# Patient Record
Sex: MALE | Race: WHITE | NOT HISPANIC OR LATINO | Employment: FULL TIME | URBAN - METROPOLITAN AREA
[De-identification: names, ages, dates, MRNs, and addresses within clinical notes are randomized per-mention and may not be internally consistent; named-entity substitution may affect disease eponyms.]

---

## 2017-01-04 ENCOUNTER — APPOINTMENT (OUTPATIENT)
Dept: PHYSICAL THERAPY | Facility: REHABILITATION | Age: 67
End: 2017-01-04
Payer: COMMERCIAL

## 2017-01-06 ENCOUNTER — APPOINTMENT (OUTPATIENT)
Dept: PHYSICAL THERAPY | Facility: REHABILITATION | Age: 67
End: 2017-01-06
Payer: COMMERCIAL

## 2017-01-06 PROCEDURE — 97110 THERAPEUTIC EXERCISES: CPT

## 2017-01-06 PROCEDURE — 97140 MANUAL THERAPY 1/> REGIONS: CPT

## 2017-01-12 ENCOUNTER — APPOINTMENT (OUTPATIENT)
Dept: PHYSICAL THERAPY | Facility: REHABILITATION | Age: 67
End: 2017-01-12
Payer: COMMERCIAL

## 2017-01-27 ENCOUNTER — APPOINTMENT (OUTPATIENT)
Dept: PHYSICAL THERAPY | Facility: REHABILITATION | Age: 67
End: 2017-01-27
Payer: COMMERCIAL

## 2017-01-30 ENCOUNTER — APPOINTMENT (OUTPATIENT)
Dept: PHYSICAL THERAPY | Facility: REHABILITATION | Age: 67
End: 2017-01-30
Payer: COMMERCIAL

## 2017-01-30 PROCEDURE — 97110 THERAPEUTIC EXERCISES: CPT

## 2017-01-30 PROCEDURE — 97140 MANUAL THERAPY 1/> REGIONS: CPT

## 2017-02-02 ENCOUNTER — APPOINTMENT (OUTPATIENT)
Dept: PHYSICAL THERAPY | Facility: REHABILITATION | Age: 67
End: 2017-02-02
Payer: COMMERCIAL

## 2017-02-06 ENCOUNTER — APPOINTMENT (OUTPATIENT)
Dept: PHYSICAL THERAPY | Facility: REHABILITATION | Age: 67
End: 2017-02-06
Payer: COMMERCIAL

## 2017-02-06 PROCEDURE — 97140 MANUAL THERAPY 1/> REGIONS: CPT

## 2017-02-06 PROCEDURE — 97110 THERAPEUTIC EXERCISES: CPT

## 2017-02-13 ENCOUNTER — APPOINTMENT (OUTPATIENT)
Dept: PHYSICAL THERAPY | Facility: REHABILITATION | Age: 67
End: 2017-02-13
Payer: COMMERCIAL

## 2017-02-13 PROCEDURE — 97110 THERAPEUTIC EXERCISES: CPT

## 2017-02-13 PROCEDURE — 97140 MANUAL THERAPY 1/> REGIONS: CPT

## 2017-02-20 ENCOUNTER — APPOINTMENT (OUTPATIENT)
Dept: PHYSICAL THERAPY | Facility: REHABILITATION | Age: 67
End: 2017-02-20
Payer: COMMERCIAL

## 2017-02-20 PROCEDURE — 97110 THERAPEUTIC EXERCISES: CPT

## 2017-02-20 PROCEDURE — 97140 MANUAL THERAPY 1/> REGIONS: CPT

## 2017-02-27 ENCOUNTER — APPOINTMENT (OUTPATIENT)
Dept: PHYSICAL THERAPY | Facility: REHABILITATION | Age: 67
End: 2017-02-27
Payer: COMMERCIAL

## 2017-03-17 ENCOUNTER — APPOINTMENT (OUTPATIENT)
Dept: LAB | Facility: CLINIC | Age: 67
End: 2017-03-17
Payer: COMMERCIAL

## 2017-03-17 ENCOUNTER — TRANSCRIBE ORDERS (OUTPATIENT)
Dept: LAB | Facility: CLINIC | Age: 67
End: 2017-03-17

## 2017-03-17 DIAGNOSIS — E03.9 UNSPECIFIED HYPOTHYROIDISM: Primary | ICD-10-CM

## 2017-03-17 DIAGNOSIS — E03.9 UNSPECIFIED HYPOTHYROIDISM: ICD-10-CM

## 2017-03-17 LAB — TSH SERPL DL<=0.05 MIU/L-ACNC: 0.17 UIU/ML (ref 0.36–3.74)

## 2017-03-17 PROCEDURE — 36415 COLL VENOUS BLD VENIPUNCTURE: CPT

## 2017-03-17 PROCEDURE — 84443 ASSAY THYROID STIM HORMONE: CPT

## 2017-05-22 ENCOUNTER — TRANSCRIBE ORDERS (OUTPATIENT)
Dept: LAB | Facility: CLINIC | Age: 67
End: 2017-05-22

## 2017-05-22 ENCOUNTER — APPOINTMENT (OUTPATIENT)
Dept: LAB | Facility: CLINIC | Age: 67
End: 2017-05-22
Payer: COMMERCIAL

## 2017-05-22 DIAGNOSIS — E03.9 UNSPECIFIED HYPOTHYROIDISM: ICD-10-CM

## 2017-05-22 DIAGNOSIS — E03.9 UNSPECIFIED HYPOTHYROIDISM: Primary | ICD-10-CM

## 2017-05-22 LAB — TSH SERPL DL<=0.05 MIU/L-ACNC: 0.71 UIU/ML (ref 0.36–3.74)

## 2017-05-22 PROCEDURE — 36415 COLL VENOUS BLD VENIPUNCTURE: CPT

## 2017-05-22 PROCEDURE — 84443 ASSAY THYROID STIM HORMONE: CPT

## 2017-09-14 DIAGNOSIS — Z12.5 ENCOUNTER FOR SCREENING FOR MALIGNANT NEOPLASM OF PROSTATE: ICD-10-CM

## 2017-09-29 ENCOUNTER — APPOINTMENT (OUTPATIENT)
Dept: LAB | Facility: CLINIC | Age: 67
End: 2017-09-29
Payer: COMMERCIAL

## 2017-09-29 ENCOUNTER — TRANSCRIBE ORDERS (OUTPATIENT)
Dept: LAB | Facility: CLINIC | Age: 67
End: 2017-09-29

## 2017-09-29 DIAGNOSIS — E03.9 UNSPECIFIED HYPOTHYROIDISM: Primary | ICD-10-CM

## 2017-09-29 DIAGNOSIS — Z12.5 ENCOUNTER FOR SCREENING FOR MALIGNANT NEOPLASM OF PROSTATE: ICD-10-CM

## 2017-09-29 DIAGNOSIS — E03.9 UNSPECIFIED HYPOTHYROIDISM: ICD-10-CM

## 2017-09-29 LAB
PSA SERPL-MCNC: 0.8 NG/ML (ref 0–4)
TSH SERPL DL<=0.05 MIU/L-ACNC: 0.18 UIU/ML (ref 0.36–3.74)

## 2017-09-29 PROCEDURE — 36415 COLL VENOUS BLD VENIPUNCTURE: CPT

## 2017-09-29 PROCEDURE — 84443 ASSAY THYROID STIM HORMONE: CPT

## 2017-09-29 PROCEDURE — 84153 ASSAY OF PSA TOTAL: CPT

## 2017-10-04 ENCOUNTER — ALLSCRIPTS OFFICE VISIT (OUTPATIENT)
Dept: OTHER | Facility: OTHER | Age: 67
End: 2017-10-04

## 2017-10-04 LAB
CLARITY UR: NORMAL
COLOR UR: YELLOW
GLUCOSE (HISTORICAL): NORMAL
HGB UR QL STRIP.AUTO: NORMAL
KETONES UR STRIP-MCNC: NORMAL MG/DL
LEUKOCYTE ESTERASE UR QL STRIP: NORMAL
NITRITE UR QL STRIP: NORMAL
PH UR STRIP.AUTO: 6 [PH]
PROT UR STRIP-MCNC: NORMAL MG/DL
SP GR UR STRIP.AUTO: 1.01

## 2017-10-27 NOTE — PROGRESS NOTES
Assessment  1  Nephrolithiasis (592 0) (N20 0)   2  Screening for prostate cancer (V76 44) (Z12 5)   3  Impotence (607 84) (N52 9)    Plan   Impotence    · Cialis 20 MG Oral Tablet; Take as directed   Rx By: Patricia Cota; Dispense: 18 Days ; #:18 Tablet; Refill: 3;For: Impotence; INOCENCIO = N; Verified Transmission to [facility]/PHARMACY #[id number] Last Updated By: System, [employer]; 10/4/2017 3:28:26 PM   · (1) PSA, DIAGNOSTIC (FOLLOW-UP); Status:Active; Requested WQD:[id number]; Perform:[facility]; [id number];[facility]; For:Impotence; Ordered By:Jacob Ramos;  Nephrolithiasis    · Urine Dip Non-Automated- POC; Status:Complete - Retrospective By Protocol  Authorization;   Done: [id number] 03:06PM   Performed: In Office; UCO:[id number]; Last Updated Lebam Key; 10/4/2017 3:08:54 PM;Ordered;For:Nephrolithiasis; Ordered By:Se Ramos;    Follow-up visit in 1 year Evaluation and Treatment  Follow-up  Status: Hold For - Scheduling  Requested for: 20HLH4058  Ordered; For: Impotence;  Ordered By: Patricia Cota  Performed:   Due: 85DHE4536     Discussion/Summary  Discussion Summary:   Patient continues to do well from a urologic standpoint  Cialis script renewed  He will return in 1 year with PSA prior  Patient's Capacity to Self-Care: Patient is able to Self-Care  Chief Complaint  Chief Complaint Free Text Note Form: nephrolithiasis, prostate cancer screening      History of Present Illness  HPI: Patient with established history of ED and nephrolithiasis returns in follow up  He denies any interval flank pain or hematuria  Reports nocturia x2-4 which is diet related  No interested in ED treatment at this time  PSA is stable at 0 8 and stable  Adequate response to Cialis  Review of Systems  Complete-Male Urology:   Constitutional: No fever or chills, feels well, no tiredness, no recent weight gain or weight loss     Respiratory: No complaints of shortness of breath, no wheezing, no cough, no SOB on exertion, no orthopnea or PND  Cardiovascular: No complaints of slow heart rate, no fast heart rate, no chest pain, no palpitations, no leg claudication, no lower extremity  Gastrointestinal: No complaints of abdominal pain, no constipation, no nausea or vomiting, no diarrhea or bloody stools  Genitourinary: Empty sensation-- and-- stream quality fair, but-- as noted in HPI,-- no dysuria,-- no urinary hesitancy,-- no hematuria,-- no incontinence-- and-- no feelings of urinary urgency--    The patient presents with complaints of nocturia (4-5 x per night)  Musculoskeletal: No complaints of arthralgia, no myalgias, no joint swelling or stiffness, no limb pain or swelling  Integumentary: No complaints of skin rash or skin lesions, no itching, no skin wound, no dry skin  Hematologic/Lymphatic: No complaints of swollen glands, no swollen glands in the neck, does not bleed easily, no easy bruising  Neurological: No compliants of headache, no confusion, no convulsions, no numbness or tingling, no dizziness or fainting, no limb weakness, no difficulty walking  Active Problems  1  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)   2  Chronic obstructive pulmonary disease (496) (J44 9)   3  Coronary artery disease (414 00) (I25 10)   4  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)   5  Diverticulosis (562 10) (K57 90)   6  Esophageal reflux (530 81) (K21 9)   7  Hiatal hernia (553 3) (K44 9)   8  History of allergy (V15 09) (Z88 9)   9  Hyperlipidemia (272 4) (E78 5)   10  Hypertension (401 9) (I10)   11  Hypothyroidism (244 9) (E03 9)   12  Lower back pain (724 2) (M54 5)   13  Nephrolithiasis (592 0) (N20 0)   14  Pain in joint of left shoulder (719 41) (M25 512)   15  Pain in joint of right shoulder region (719 41) (M25 511)   16  Pancytopenia (284 19) (D61 818)   17  Rupture of right long head biceps tendon, sequela (905 8) (S46 111S)   18   Screening for prostate cancer (V76 44) (Z12 5)   19  Special screening examination for neoplasm of prostate (V76 44) (Z12 5)   20  Subacromial impingement of right shoulder (726 19) (M75 41)    Past Medical History  1  History of Abdominal pain, LLQ (left lower quadrant) (789 04) (R10 32)   2  History of Elevated ALT measurement (790 4) (R74 0)   3  History of Elevated AST (SGOT) (790 4) (R74 0)   4  History of Fatty liver (571 8) (K76 0)   5  History of diverticulitis of colon (V12 79) (Z87 19)   6  History of gastritis (V12 79) (Z87 19)   7  History of hemorrhoids (V13 89) (Z87 19)   8  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)   9  History of Lower esophageal ring (750 3) (Q39 3)   10  History of Nephrolithiasis (V13 01)   11  History of Trigger finger (727 03) (M65 30)   12  History of Ulcerative colitis without complications (130 4) (D95 29)  Active Problems And Past Medical History Reviewed: The active problems and past medical history were reviewed and updated today  Surgical History  1  History of Appendectomy   2  History of Biopsy Lymph Node   3  History of Biopsy Of Liver   4  History of Cath Stent Placement Number Of Stents Placed:   5  History of Colonoscopy (Fiberoptic)   6  History of Diagnostic Esophagogastroduodenoscopy   7  History of Hand Incision Tendon Sheath Of A Finger   8  History of Inguinal Hernia Repair  Surgical History Reviewed: The surgical history was reviewed and updated today  Family History  Mother    1  Family history of Coronary Artery Disease (V17 49)  Father    2  Family history of Acute Myocardial Infarction (V17 3)  Family History    3  Family history of Gastric Cancer (V16 0)  Family History Reviewed: The family history was reviewed and updated today  Social History   · Being A Social Drinker   · Former smoker (V80 86) (R69 887)   · Marital History -  (V61 03)   · Occupation:   · Working Full Time  Social History Reviewed:  The social history was reviewed and updated today       Current Meds   1  Aspirin 325 MG Oral Tablet; TAKE 1 TABLET DAILY; Therapy: 02MIF7795 to Recorded   2  Levothyroxine Sodium 175 MCG Oral Tablet; TAKE 1 TABLET BY MOUTH ONCE DAILY; Therapy: 20EDN4013 to (Kay Ethel)  Requested for: 62MRI3302; Last   Rx:25Oct2012 Ordered   3  Metoprolol Succinate ER 25 MG Oral Tablet Extended Release 24 Hour; Take 1 tablet   daily; Therapy: 38BRR7328 to (Evaluate:17Oct2013); Last Rx:22Oct2012 Ordered   4  Multiple Vitamin TABS; TAKE 1 TABLET DAILY; Therapy: ([phone]) to Recorded   5  Pantoprazole Sodium 40 MG Oral Tablet Delayed Release; TAKE 1 TABLET 30 MINUTES   BEFORE BREAKFAST DAILY; Therapy: 94HQR9805 to (Evaluate:20Apr2013) Recorded   6  Simvastatin 40 MG Oral Tablet; TAKE 1 TABLET DAILY; Therapy: 22LMB3730 to (Evaluate:17Oct2013); Last Rx:22Oct2012 Ordered   7  ZyrTEC Allergy 10 MG Oral Tablet; TAKE 1 TABLET DAILY AS DIRECTED; Therapy: 08PIH1597 to (Evaluate:20Apr2013) Recorded  Medication List Reviewed: The medication list was reviewed and updated today  Allergies  1  Nicotine Transdermal System 21-14-7 MG/24HR KIT    Vitals  Vital Signs    Recorded: 03VXN3889 03:04PM   Heart Rate 80   Systolic 135   Diastolic 84   Height 5 ft 4 in   Weight 170 lb 2 oz   BMI Calculated 29 2   BSA Calculated 1 83     Physical Exam    Constitutional   General appearance: No acute distress, well appearing and well nourished  Pulmonary   Respiratory effort: No increased work of breathing or signs of respiratory distress  Auscultation of lungs: Clear to auscultation  Cardiovascular   Auscultation of heart: Normal rate and rhythm, normal S1 and S2, without murmurs  Examination of extremities for edema and/or varicosities: Normal     Abdomen   Abdomen: Non-tender, no masses  Genitourinary   Anus and perineum: Normal     Scrotum contents: Normal size, no masses  Epididymis: Normal, no masses  Testes: Normal testes, no masses  Urethral meatus: Normal, no lesions  Penis: Normal, no lesions  Digital rectal exam of prostate: Normal size, no masses  -- 35 gram  No nodules  Musculoskeletal   Gait and station: Normal     Digits and nails: Normal without clubbing or cyanosis  Inspection/palpation of joints, bones, and muscles: Normal     Skin   Skin and subcutaneous tissue: Normal without rashes or lesions  Lymphatic   Palpation of lymph nodes in groin: Normal     Neurologic   Cranial nerves: Cranial nerves 2-12 intact  Results/Data  Urine Dip Non-Automated- POC [id number] 03:06PM Marivel Gregorio     Test Name Result Flag Reference   Color Yellow     Clarity Transparent     Leukocytes -     Nitrite -     Blood -     Protein -     Ph 6 0     Specific Gravity 1 015     Ketone -     Glucose -         Future Appointments    Date/Time Provider Specialty Site   10/03/2018 03:15 PM ANDREE Ludwig   Urology [address] [place] [address]     Signatures   Electronically signed by : ANDREE Zacarias ; Oct  5 2017  7:25AM EST                       (Author)

## 2017-11-21 ENCOUNTER — TRANSCRIBE ORDERS (OUTPATIENT)
Dept: LAB | Facility: CLINIC | Age: 67
End: 2017-11-21

## 2017-11-21 ENCOUNTER — APPOINTMENT (OUTPATIENT)
Dept: LAB | Facility: CLINIC | Age: 67
End: 2017-11-21
Payer: COMMERCIAL

## 2017-11-21 DIAGNOSIS — E78.5 HYPERLIPIDEMIA, UNSPECIFIED HYPERLIPIDEMIA TYPE: Primary | ICD-10-CM

## 2017-11-21 DIAGNOSIS — I25.119 ATHEROSCLEROSIS OF NATIVE CORONARY ARTERY WITH ANGINA PECTORIS, UNSPECIFIED WHETHER NATIVE OR TRANSPLANTED HEART (HCC): ICD-10-CM

## 2017-11-21 DIAGNOSIS — E78.5 HYPERLIPIDEMIA, UNSPECIFIED HYPERLIPIDEMIA TYPE: ICD-10-CM

## 2017-11-21 DIAGNOSIS — E55.9 AVITAMINOSIS D: ICD-10-CM

## 2017-11-21 LAB
25(OH)D3 SERPL-MCNC: 36.4 NG/ML (ref 30–100)
ALBUMIN SERPL BCP-MCNC: 4.1 G/DL (ref 3.5–5)
ALP SERPL-CCNC: 64 U/L (ref 46–116)
ALT SERPL W P-5'-P-CCNC: 42 U/L (ref 12–78)
ANION GAP SERPL CALCULATED.3IONS-SCNC: 5 MMOL/L (ref 4–13)
AST SERPL W P-5'-P-CCNC: 27 U/L (ref 5–45)
BILIRUB SERPL-MCNC: 0.6 MG/DL (ref 0.2–1)
BUN SERPL-MCNC: 17 MG/DL (ref 5–25)
CALCIUM SERPL-MCNC: 9.1 MG/DL (ref 8.3–10.1)
CHLORIDE SERPL-SCNC: 102 MMOL/L (ref 100–108)
CHOLEST SERPL-MCNC: 143 MG/DL (ref 50–200)
CO2 SERPL-SCNC: 30 MMOL/L (ref 21–32)
CREAT SERPL-MCNC: 0.88 MG/DL (ref 0.6–1.3)
ERYTHROCYTE [DISTWIDTH] IN BLOOD BY AUTOMATED COUNT: 13.5 % (ref 11.6–15.1)
GFR SERPL CREATININE-BSD FRML MDRD: 90 ML/MIN/1.73SQ M
GLUCOSE P FAST SERPL-MCNC: 103 MG/DL (ref 65–99)
HCT VFR BLD AUTO: 47.5 % (ref 36.5–49.3)
HDLC SERPL-MCNC: 37 MG/DL (ref 40–60)
HGB BLD-MCNC: 16.4 G/DL (ref 12–17)
LDLC SERPL CALC-MCNC: 75 MG/DL (ref 0–100)
MCH RBC QN AUTO: 31.1 PG (ref 26.8–34.3)
MCHC RBC AUTO-ENTMCNC: 34.5 G/DL (ref 31.4–37.4)
MCV RBC AUTO: 90 FL (ref 82–98)
PLATELET # BLD AUTO: 192 THOUSANDS/UL (ref 149–390)
PMV BLD AUTO: 9.9 FL (ref 8.9–12.7)
POTASSIUM SERPL-SCNC: 4.2 MMOL/L (ref 3.5–5.3)
PROT SERPL-MCNC: 6.7 G/DL (ref 6.4–8.2)
RBC # BLD AUTO: 5.28 MILLION/UL (ref 3.88–5.62)
SODIUM SERPL-SCNC: 137 MMOL/L (ref 136–145)
TRIGL SERPL-MCNC: 156 MG/DL
WBC # BLD AUTO: 5.58 THOUSAND/UL (ref 4.31–10.16)

## 2017-11-21 PROCEDURE — 85027 COMPLETE CBC AUTOMATED: CPT

## 2017-11-21 PROCEDURE — 36415 COLL VENOUS BLD VENIPUNCTURE: CPT

## 2017-11-21 PROCEDURE — 80061 LIPID PANEL: CPT

## 2017-11-21 PROCEDURE — 82306 VITAMIN D 25 HYDROXY: CPT

## 2017-11-21 PROCEDURE — 80053 COMPREHEN METABOLIC PANEL: CPT

## 2018-01-14 VITALS
HEIGHT: 64 IN | HEART RATE: 80 BPM | WEIGHT: 170.13 LBS | SYSTOLIC BLOOD PRESSURE: 138 MMHG | BODY MASS INDEX: 29.05 KG/M2 | DIASTOLIC BLOOD PRESSURE: 84 MMHG

## 2018-03-15 ENCOUNTER — TRANSCRIBE ORDERS (OUTPATIENT)
Dept: RADIOLOGY | Facility: CLINIC | Age: 68
End: 2018-03-15

## 2018-03-15 ENCOUNTER — APPOINTMENT (OUTPATIENT)
Dept: RADIOLOGY | Facility: CLINIC | Age: 68
End: 2018-03-15
Payer: COMMERCIAL

## 2018-03-15 DIAGNOSIS — J18.9 PNEUMONIA, ORGANISM UNSPECIFIED(486): ICD-10-CM

## 2018-03-15 DIAGNOSIS — J18.9 PNEUMONIA, ORGANISM UNSPECIFIED(486): Primary | ICD-10-CM

## 2018-03-15 PROCEDURE — 71046 X-RAY EXAM CHEST 2 VIEWS: CPT

## 2018-04-27 RX ORDER — TADALAFIL 20 MG
TABLET ORAL
Refills: 0 | COMMUNITY
Start: 2018-02-05 | End: 2018-08-17

## 2018-04-27 RX ORDER — PANTOPRAZOLE SODIUM 40 MG/1
1 TABLET, DELAYED RELEASE ORAL 2 TIMES DAILY
COMMUNITY
Start: 2012-10-22

## 2018-04-27 RX ORDER — LEVOTHYROXINE SODIUM 175 UG/1
1 TABLET ORAL DAILY
COMMUNITY
Start: 2012-03-19

## 2018-04-27 RX ORDER — SIMVASTATIN 40 MG
1 TABLET ORAL DAILY
COMMUNITY
Start: 2012-10-22 | End: 2022-07-08 | Stop reason: SDUPTHER

## 2018-04-27 RX ORDER — TADALAFIL 20 MG/1
TABLET ORAL
COMMUNITY
Start: 2017-10-04 | End: 2018-08-17

## 2018-04-27 RX ORDER — MULTIVITAMIN
1 TABLET ORAL DAILY
COMMUNITY

## 2018-04-27 RX ORDER — CETIRIZINE HYDROCHLORIDE 10 MG/1
1 TABLET ORAL DAILY
COMMUNITY
Start: 2012-10-22

## 2018-04-27 RX ORDER — ASPIRIN 325 MG
1 TABLET ORAL DAILY
COMMUNITY
Start: 2012-10-22

## 2018-04-27 RX ORDER — LEVOFLOXACIN 500 MG/1
TABLET, FILM COATED ORAL
Refills: 0 | Status: ON HOLD | COMMUNITY
Start: 2018-03-18 | End: 2018-08-24

## 2018-04-27 RX ORDER — METOPROLOL SUCCINATE 25 MG/1
1 TABLET, EXTENDED RELEASE ORAL DAILY
COMMUNITY
Start: 2012-10-22 | End: 2020-11-11 | Stop reason: ALTCHOICE

## 2018-04-30 ENCOUNTER — HOSPITAL ENCOUNTER (OUTPATIENT)
Dept: RADIOLOGY | Facility: HOSPITAL | Age: 68
Discharge: HOME | End: 2018-04-30
Attending: ORTHOPAEDIC SURGERY
Payer: COMMERCIAL

## 2018-04-30 VITALS
HEIGHT: 64 IN | HEART RATE: 71 BPM | DIASTOLIC BLOOD PRESSURE: 94 MMHG | SYSTOLIC BLOOD PRESSURE: 135 MMHG | BODY MASS INDEX: 28.34 KG/M2 | WEIGHT: 166 LBS

## 2018-04-30 DIAGNOSIS — M76.52 PATELLAR TENDONITIS OF LEFT KNEE: Primary | ICD-10-CM

## 2018-04-30 DIAGNOSIS — M25.562 ACUTE PAIN OF LEFT KNEE: ICD-10-CM

## 2018-04-30 PROCEDURE — 99214 OFFICE O/P EST MOD 30 MIN: CPT | Performed by: ORTHOPAEDIC SURGERY

## 2018-04-30 PROCEDURE — 73564 X-RAY EXAM KNEE 4 OR MORE: CPT

## 2018-04-30 PROCEDURE — 73562 X-RAY EXAM OF KNEE 3: CPT

## 2018-04-30 NOTE — PROGRESS NOTES
Assessment/Plan:  Assessment/Plan   1  Patellar tendonitis of left knee  Ambulatory referral to Physical Therapy   2  Acute pain of left knee  XR knee 4+ vw left injury    XR knee 3 vw right non injury       Plan  Upon physical examination and review of the x-rays, I suspect Evans Kumar has left knee patellar tendinitis which is contributing to his symptoms  At this time, I'm recommending he continue with his workout routine, but limit or avoid deep squatting positions  I'm also recommending a visit to physical therapy for consultation and treatment, and we will provide him with a prescription to schedule his appointment in the office today  At this time, I do not feel any further intervention or treatment is warranted  If he should continue to experience lingering or worsening symptoms, a left knee MRI may be appropriate to further evaluate the knee  I will follow up with Evans Kumar on an as-needed basis as his symptoms dictate  He understands and is in agreement with this treatment plan  Subjective:   Patient ID: Almaz Ca is a 78 y o  male  Evans Kumar is a 69-year-old male here today for initial evaluation of his left knee pain  He is a prior patient that was seen for a shoulder injury and reports this is doing well today, he reports ongoing left knee pain for the past 2-3 months with weightbearing activities requiring him to perform a deep squat As well as when getting out of a car or standing up from sitting  He reports his symptoms are worse a day or 2 after his workout sessions, and describes his pain as intermittent and dull in nature  He rates his pain as a 5/10 on a pain scale, and reports wearing a knee brace helps control his pain and symptoms  He denies any numbness, tingling, radiating pain, or prior left knee injury/trauma  He is hopeful to continue his workout routine, and wants to ensure he is not doing any further damage to his left knee by continuing to do          The following portions of the patient's history were reviewed and updated as appropriate:   He  has no past medical history on file  He There are no active problems to display for this patient  He  has no past surgical history on file  His family history is not on file  He  reports that he has quit smoking  He has never used smokeless tobacco  He reports that he does not drink alcohol or use drugs  Current Outpatient Prescriptions   Medication Sig Dispense Refill    aspirin 325 mg tablet Take 1 tablet by mouth daily      cetirizine (ZYRTEC ALLERGY) 10 mg tablet Take 1 tablet by mouth daily      CIALIS 20 MG tablet   0    levofloxacin (LEVAQUIN) 500 mg tablet TAKE 1 TABLET EVERY 24 HOURS FOR 7 DAYS  0    levothyroxine 175 mcg tablet Take 1 tablet by mouth daily      metoprolol succinate (TOPROL-XL) 25 mg 24 hr tablet Take 1 tablet by mouth daily      Multiple Vitamin tablet Take 1 tablet by mouth daily      pantoprazole (PROTONIX) 40 mg tablet Take 1 tablet by mouth Daily      simvastatin (ZOCOR) 40 mg tablet Take 1 tablet by mouth daily      tadalafil (CIALIS) 20 MG tablet Take by mouth       No current facility-administered medications for this visit  No current outpatient prescriptions on file prior to visit  No current facility-administered medications on file prior to visit  He is allergic to nicotine       Review of Systems   Constitutional: Negative for chills, fever and unexpected weight change  HENT: Negative for hearing loss, nosebleeds and sore throat  Eyes: Negative for pain, redness and visual disturbance  Respiratory: Negative for cough, shortness of breath and wheezing  Cardiovascular: Negative for chest pain, palpitations and leg swelling  Gastrointestinal: Negative for abdominal pain, nausea and vomiting  Endocrine: Negative for polydipsia and polyuria  Genitourinary: Negative for dysuria and hematuria  Musculoskeletal: Positive for arthralgias   Negative for joint swelling and myalgias  Skin: Negative for rash and wound  Neurological: Negative for dizziness, numbness and headaches  Psychiatric/Behavioral: Negative for decreased concentration and suicidal ideas  The patient is not nervous/anxious  (-) Depression       Objective:  Left Knee Exam     Tenderness   The patient is experiencing tenderness in the patellar tendon  Range of Motion   The patient has normal left knee ROM  Extension: normal   Flexion: normal     Tests   Johnny:  Medial - negative Lateral - negative  Lachman:  Anterior - negative      Drawer:       Anterior - negative     Posterior - negative  Varus: negative  Valgus: negative  Pivot Shift: negative  Patellar Apprehension: negative    Other   Scars: absent  Sensation: normal  Pulse: present  Swelling: none  Effusion: no effusion present    Comments:  Patellar grind: Positive          Observations   Left Knee   Negative for effusion  Physical Exam   Constitutional: He is oriented to person, place, and time  He appears well-developed and well-nourished  HENT:   Head: Normocephalic  Nose: Nose normal    Eyes: Conjunctivae and EOM are normal    Neck: Normal range of motion  Cardiovascular: Normal rate and intact distal pulses  Pulmonary/Chest: Effort normal  No respiratory distress  Abdominal: Soft  He exhibits no distension  Musculoskeletal: Normal range of motion  Left knee: He exhibits normal range of motion, no swelling, no effusion, normal alignment, no LCL laxity, normal patellar mobility and no MCL laxity  Tenderness found  Medial joint line and patellar tendon tenderness noted  Legs:  Neurological: He is alert and oriented to person, place, and time  Skin: Skin is warm and dry  Psychiatric: He has a normal mood and affect   His behavior is normal  Judgment and thought content normal        I have personally reviewed pertinent films in PACS and my interpretation is Left knee x-rays are unremarkable, no obvious fracture or deformity noted

## 2018-05-14 ENCOUNTER — EVALUATION (OUTPATIENT)
Dept: PHYSICAL THERAPY | Facility: REHABILITATION | Age: 68
End: 2018-05-14
Payer: COMMERCIAL

## 2018-05-14 DIAGNOSIS — M76.52 PATELLAR TENDONITIS OF LEFT KNEE: ICD-10-CM

## 2018-05-14 PROCEDURE — G8978 MOBILITY CURRENT STATUS: HCPCS | Performed by: PHYSICAL THERAPIST

## 2018-05-14 PROCEDURE — G8979 MOBILITY GOAL STATUS: HCPCS | Performed by: PHYSICAL THERAPIST

## 2018-05-14 PROCEDURE — 97140 MANUAL THERAPY 1/> REGIONS: CPT | Performed by: PHYSICAL THERAPIST

## 2018-05-14 PROCEDURE — 97110 THERAPEUTIC EXERCISES: CPT | Performed by: PHYSICAL THERAPIST

## 2018-05-14 PROCEDURE — 97162 PT EVAL MOD COMPLEX 30 MIN: CPT | Performed by: PHYSICAL THERAPIST

## 2018-05-14 NOTE — PROGRESS NOTES
Evaluation    Today's date: 2018  Patient name: Art Nichole  : 1950  MRN: [id number]  Referring provider: Miladys Desouza MD  Dx:   Encounter Diagnosis     ICD-10-CM    1  Patellar tendonitis of left knee M76 52 Ambulatory referral to Physical Therapy       Start Time: 1450  Stop Time: 1540  Total time in clinic (min): 50 minutes    Assessment  Impairments: abnormal muscle tone, impaired balance, lacks appropriate home exercise program, pain with function and weight-bearing intolerance    Assessment details: 77 y/o male with c/o b/l (left > right) anterior knee pain that started a few months ago  He denies pain at rest in the seated position, but c/o an "unstable" feeling in his knee when he goes from a sitting to a standing position  Also, he c/o an "irritation" in the superior compartment above the patella with deep squatting and u/l activity  He denies any N/T or red flag sx's  Pt was recently seen by an ortho MD   X-rays were (-)  Pt referred to PT     Understanding of Dx/Px/POC: good   Prognosis: good    Goals  ST - I with HEP  2 - perform a functional squat without pain  LT - get in/out of the car without any pain or feeling of "instability"  2 - FOTO > 79    Plan  Patient would benefit from: skilled PT  Planned therapy interventions: manual therapy, neuromuscular re-education, patient education, therapeutic activities, therapeutic exercise and home exercise program  Frequency: 1x week  Treatment plan discussed with: patient        Subjective Evaluation    Quality of life: excellent    Pain  At best pain ratin  At worst pain ratin  Quality: dull ache and sharp  Relieving factors: rest and change in position      Diagnostic Tests  X-ray: normal  Patient Goals  Patient goals for therapy: increased strength, independence with ADLs/IADLs, return to sport/leisure activities, improved balance and decreased pain          Objective     Palpation     Additional Palpation Details  Palpation:  Left - increased tissue texture density of the deep distal quadriceps    Neurological Testing     Sensation     Knee   Left Knee   Intact: pin prick    Right Knee   Intact: pin prick     Active Range of Motion   Left Knee   Flexion: 120 degrees   Extension: 0 degrees     Right Knee   Flexion: 125 degrees   Extension: 0 degrees     Joint Play     Comments  Left proximal tibiofibular joint comments: PKB:  Right = 120, left = 130  Tests     Left Knee   Negative anterior drawer, posterior drawer, valgus stress test at 0 degrees and varus stress test at 0 degrees  Right Knee   Negative anterior drawer, posterior drawer, valgus stress test at 0 degrees and varus stress test at 0 degrees       Additional Tests Details  Functional tests:  b/l squat: fair due to torso collapse  u/l squat:  Poor due to knee collapse on left and trunk compensation on right      Flowsheet Rows      Most Recent Value   PT/OT G-Codes   Current Score  72   Projected Score  79   FOTO information reviewed  Yes   Assessment Type  Evaluation   G code set  Mobility: Walking & Moving Around   Mobility: Walking and Moving Around Current Status ()  CJ   Mobility: Walking and Moving Around Goal Status ()  CI          Precautions: hx CA    Daily Treatment Diary     Manual  05/14            Tissue deformation - distal quad (deep) LMR                                                                    Exercise Diary  05/14            u/l bridge - foot on p-ball 15"x3            Wall sits 15"x3            Quad smash (kb or lacrosse ball) Verbal                                                                                                                                                                                                                                             Modalities

## 2018-05-22 ENCOUNTER — OFFICE VISIT (OUTPATIENT)
Dept: PHYSICAL THERAPY | Facility: REHABILITATION | Age: 68
End: 2018-05-22
Payer: COMMERCIAL

## 2018-05-22 DIAGNOSIS — M76.52 PATELLAR TENDONITIS OF LEFT KNEE: Primary | ICD-10-CM

## 2018-05-22 PROCEDURE — 97530 THERAPEUTIC ACTIVITIES: CPT | Performed by: PHYSICAL THERAPIST

## 2018-05-22 PROCEDURE — 97110 THERAPEUTIC EXERCISES: CPT | Performed by: PHYSICAL THERAPIST

## 2018-05-22 NOTE — PROGRESS NOTES
Daily Note     Today's date: 2018  Patient name: Lynn Reddy  : 1950  MRN: [id number]  Referring provider: Parish Gama MD  Dx:   Encounter Diagnosis     ICD-10-CM    1  Patellar tendonitis of left knee M76 52        Start Time: 1630  Stop Time: 1715  Total time in clinic (min): 45 minutes    Subjective: Pt notes compliance with the current HEP  He occasionally feels tension on his left knee when doing the wall squats  Objective: See treatment diary below  HEP updated with banded TKE (blue band), side-ways banded TKE (green band), and hip hinging  Assessment: Tolerated treatment well  Patient demonstrated fatigue post treatment    Plan: Continue per plan of care          Precautions: hx CA    Daily Treatment Diary     Manual             Tissue deformation - distal quad (deep) LMR                                                                    Exercise Diary             u/l bridge - foot on p-ball 15"x3            Wall sits 15"x3            Quad smash (kb or lacrosse ball) Verbal            VG - b/l  L7 - 4'           VG - u/l  L7 - 3 x 7           Banded tke  Blue - fatigue x 3           tke with banded hip abd  orange - 3'           Standing hip hinge with dowel - deadlift  2'           Standing hip hinge with dowel - squat  2x20                                                                                                                                                              Modalities

## 2018-05-29 ENCOUNTER — OFFICE VISIT (OUTPATIENT)
Dept: PHYSICAL THERAPY | Facility: REHABILITATION | Age: 68
End: 2018-05-29
Payer: COMMERCIAL

## 2018-05-29 DIAGNOSIS — M76.52 PATELLAR TENDONITIS OF LEFT KNEE: Primary | ICD-10-CM

## 2018-05-29 PROCEDURE — 97530 THERAPEUTIC ACTIVITIES: CPT | Performed by: PHYSICAL THERAPIST

## 2018-05-29 PROCEDURE — 97140 MANUAL THERAPY 1/> REGIONS: CPT | Performed by: PHYSICAL THERAPIST

## 2018-05-29 NOTE — PROGRESS NOTES
Daily Note     Today's date: 2018  Patient name: Aaliyah Wheatley  : 1950  MRN: [id number]  Referring provider: Emil Byrd MD  Dx:   Encounter Diagnosis     ICD-10-CM    1  Patellar tendonitis of left knee M76 52        Start Time: 1705  Stop Time: 1755  Total time in clinic (min): 50 minutes    Subjective: Pt denies knee pain during the HEP, but c/o anterior knee pain the next day  Objective: See treatment diary below  Discussed proper technique for b/l and u/l squats  Assessment: Tolerated treatment well  Patient demonstrated fatigue post treatment    Plan: Continue per plan of care        Precautions: hx CA    Daily Treatment Diary     Manual            Tissue deformation - distal quad (deep) LMR            Prone quad stretch   LMR          Supine left hip add stretch   LMR          Gr 4 inf glide left hip   LMR          Lat distraction left hip with belt   LMR              Exercise Diary            u/l bridge - foot on p-ball 15"x3            Wall sits 15"x3            Quad smash (kb or lacrosse ball) Verbal            VG - b/l  L7 - 4'           VG - u/l  L7 - 3 x 7           Banded tke  Blue - fatigue x 3           tke with banded hip abd  orange - 3'           Standing hip hinge with dowel - deadlift  2'           Standing hip hinge with dowel - squat  2x20           Band assisted u/l squat   30 ea          Squat technique   5'                                                                                                                                   Modalities

## 2018-06-05 ENCOUNTER — OFFICE VISIT (OUTPATIENT)
Dept: PHYSICAL THERAPY | Facility: REHABILITATION | Age: 68
End: 2018-06-05
Payer: COMMERCIAL

## 2018-06-05 DIAGNOSIS — M76.52 PATELLAR TENDONITIS OF LEFT KNEE: Primary | ICD-10-CM

## 2018-06-05 PROCEDURE — 97140 MANUAL THERAPY 1/> REGIONS: CPT | Performed by: PHYSICAL THERAPIST

## 2018-06-05 PROCEDURE — 97110 THERAPEUTIC EXERCISES: CPT | Performed by: PHYSICAL THERAPIST

## 2018-06-05 PROCEDURE — 97530 THERAPEUTIC ACTIVITIES: CPT | Performed by: PHYSICAL THERAPIST

## 2018-06-05 NOTE — PROGRESS NOTES
Daily Note     Today's date: 2018  Patient name: Opal Loya  : 1950  MRN: [id number]  Referring provider: Mckenzie Lawton MD  Dx:   Encounter Diagnosis     ICD-10-CM    1  Patellar tendonitis of left knee M76 52        Start Time: 1610  Stop Time: 1705  Total time in clinic (min): 55 minutes    Subjective: No pain with functional squatting, but mild discomfort with u/l limb activity  Objective: See treatment diary below  HEP updated with side-stepping and tall kneel hip hinging  Assessment: Tolerated treatment well  Patient exhibited good technique with therapeutic exercises    Plan: Continue per plan of care         Precautions: hx CA    Daily Treatment Diary     Manual           Tissue deformation - distal quad (deep) LMR   LMR         Prone quad stretch   LMR LMR         s/l hip flexor stretch    LMR                                   Supine left hip add stretch   LMR          Gr 4 inf glide left hip   LMR          Lat distraction left hip with belt   LMR              Exercise Diary           u/l bridge - foot on p-ball 15"x3            Wall sits 15"x3            Quad smash (kb or lacrosse ball) Verbal            VG - b/l  L7 - 4'  L7 - 5'x2         VG - u/l  L7 - 3 x 7           Banded tke  Blue - fatigue x 3           tke with banded hip abd  orange - 3'           Standing hip hinge with dowel - deadlift  2'  40         Standing hip hinge with dowel - squat  2x20  40         Band assisted u/l squat   30 ea          Squat technique   5'          u/l squat - p-ball on wall    2x15         Banded side stepping    Blue - 50 ft x 2                                                                                                        Modalities

## 2018-06-12 ENCOUNTER — OFFICE VISIT (OUTPATIENT)
Dept: PHYSICAL THERAPY | Facility: REHABILITATION | Age: 68
End: 2018-06-12
Payer: COMMERCIAL

## 2018-06-12 DIAGNOSIS — M76.52 PATELLAR TENDONITIS OF LEFT KNEE: Primary | ICD-10-CM

## 2018-06-12 PROCEDURE — G8979 MOBILITY GOAL STATUS: HCPCS | Performed by: PHYSICAL THERAPIST

## 2018-06-12 PROCEDURE — 97110 THERAPEUTIC EXERCISES: CPT | Performed by: PHYSICAL THERAPIST

## 2018-06-12 PROCEDURE — G8978 MOBILITY CURRENT STATUS: HCPCS | Performed by: PHYSICAL THERAPIST

## 2018-06-12 PROCEDURE — 97140 MANUAL THERAPY 1/> REGIONS: CPT | Performed by: PHYSICAL THERAPIST

## 2018-06-12 NOTE — PROGRESS NOTES
Daily Note     Today's date: 2018  Patient name: Jackie Jorge  : 1950  MRN: [id number]  Referring provider: Virgilio Arias MD  Dx:   Encounter Diagnosis     ICD-10-CM    1  Patellar tendonitis of left knee M76 52        Start Time: 1640  Stop Time: 1720  Total time in clinic (min): 40 minutes    Subjective: Pt c/o along the LJL of the left knee the past two days  Objective: See treatment diary below  HEP updated with banded bridge and mini-SLR  Half-kneeling ankle df lunge:  Left more limited than right  Discussed activity modification to allow for tissue healing  Assessment: Tolerated treatment well  Patient demonstrated fatigue post treatment    Plan: Continue per plan of care       Precautions: hx CA    Daily Treatment Diary     Manual          Tissue deformation - distal quad (deep) LMR   LMR         Prone quad stretch   LMR LMR LMR        s/l hip flexor stretch    LMR         s/l tissue deformation - distal ITB     LMR        s/l tissue deformation - deep hip rotaters     LMR        Supine left hip add stretch   LMR          Gr 4 inf glide left hip   LMR          Lat distraction left hip with belt   LMR              Exercise Diary          u/l bridge - foot on p-ball 15"x3            Wall sits 15"x3            Quad smash (kb or lacrosse ball) Verbal            VG - b/l  L7 - 4'  L7 - 5'x2         VG - u/l  L7 - 3 x 7           Banded tke  Blue - fatigue x 3           tke with banded hip abd  orange - 3'           Standing hip hinge with dowel - deadlift  2'  40         Standing hip hinge with dowel - squat  2x20  40         Band assisted u/l squat   30 ea          Squat technique   5'          u/l squat - p-ball on wall    2x15         Banded side stepping    Blue - 50 ft x 2         Banded bridge     Blue - 2x25        Mini-SLR     10" - 2x5        VMO mini-SLR     10" - 2x5 Modalities

## 2018-06-18 ENCOUNTER — OFFICE VISIT (OUTPATIENT)
Dept: PHYSICAL THERAPY | Facility: REHABILITATION | Age: 68
End: 2018-06-18
Payer: COMMERCIAL

## 2018-06-18 DIAGNOSIS — M76.52 PATELLAR TENDONITIS OF LEFT KNEE: Primary | ICD-10-CM

## 2018-06-18 PROCEDURE — 97110 THERAPEUTIC EXERCISES: CPT | Performed by: PHYSICAL THERAPIST

## 2018-06-18 PROCEDURE — G8979 MOBILITY GOAL STATUS: HCPCS | Performed by: PHYSICAL THERAPIST

## 2018-06-18 PROCEDURE — G8978 MOBILITY CURRENT STATUS: HCPCS | Performed by: PHYSICAL THERAPIST

## 2018-06-18 PROCEDURE — 97140 MANUAL THERAPY 1/> REGIONS: CPT | Performed by: PHYSICAL THERAPIST

## 2018-06-18 NOTE — PROGRESS NOTES
Daily Note     Today's date: 2018  Patient name: Amara Forrester  : 1950  MRN: [id number]  Referring provider: Raegan Ramsay MD  Dx:   Encounter Diagnosis     ICD-10-CM    1  Patellar tendonitis of left knee M76 52        Start Time: 1630  Stop Time: 1730  Total time in clinic (min): 60 minutes     Pt has attended 6 sessions of PT from 18 to 18  Subjective: Pt c/o increased lateral knee pain over the past week  The only change in his activity that he can think of is an increased amount of sitting in his sports car  This causes his knee to be in an increased flexed position and pushed out to the side  Objective: See treatment diary below  HEP updated with lateral glute smashing with a lacrosse ball and side-stepping with a band  Palpation:  Increased tissue texture density of the lateral glutes  Joint play:  Hypomobile P-A glide of the fibular head  Assessment: Tolerated treatment well  Patient would benefit from continued PT      Plan: Continue per plan of care  Assess tibial joint play next visit       Precautions: hx CA    Daily Treatment Diary     Manual         Tissue deformation - distal quad (deep) LMR   LMR         Prone quad stretch   LMR LMR LMR LMR       s/l hip flexor stretch    LMR         s/l tissue deformation - distal ITB     LMR LMR       s/l tissue deformation - deep hip rotaters     LMR LMR       Supine left hip add stretch   LMR          S/l P-A gr 4 mobs to fib head      LMR                                 Gr 4 inf glide left hip   LMR          Lat distraction left hip with belt   LMR              Exercise Diary         u/l bridge - foot on p-ball 15"x3            Wall sits 15"x3            Quad smash (kb or lacrosse ball) Verbal            VG - b/l  L7 - 4'  L7 - 5'x2         VG - u/l  L7 - 3 x 7           Banded tke  Blue - fatigue x 3           tke with banded hip abd  orange - 3'           Standing hip hinge with dowel - deadlift  2'  40         Standing hip hinge with dowel - squat  2x20  40         Band assisted u/l squat   30 ea          Squat technique   5'          u/l squat - p-ball on wall    2x15         Banded side stepping    Blue - 50 ft x 2  verbal       Banded bridge     Blue - 2x25        Mini-SLR     10" - 2x5        VMO mini-SLR     10" - 2x5        Prone fig-4 glute activation      20"x5       Seated glute smash with lacrosse ball      verbal                                     Modalities

## 2018-06-25 ENCOUNTER — OFFICE VISIT (OUTPATIENT)
Dept: PHYSICAL THERAPY | Facility: REHABILITATION | Age: 68
End: 2018-06-25
Payer: COMMERCIAL

## 2018-06-25 DIAGNOSIS — M76.52 PATELLAR TENDONITIS OF LEFT KNEE: Primary | ICD-10-CM

## 2018-06-25 PROCEDURE — 97140 MANUAL THERAPY 1/> REGIONS: CPT | Performed by: PHYSICAL THERAPIST

## 2018-06-25 PROCEDURE — 97110 THERAPEUTIC EXERCISES: CPT | Performed by: PHYSICAL THERAPIST

## 2018-06-25 NOTE — PROGRESS NOTES
Daily Note     Today's date: 2018  Patient name: Olga Lidia Cannon  : 1950  MRN: [id number]  Referring provider: Matt Ma MD  Dx:   Encounter Diagnosis     ICD-10-CM    1  Patellar tendonitis of left knee M76 52        Start Time:   Stop Time:   Total time in clinic (min): 45 minutes    Subjective: Pt continues to c/o a 2/10 sensation on the lateral side of his knee when he goes from a prolonged sitting position into a standing position  Objective: See treatment diary below  Tibia joint play:  Decreased IR  Pt instructed in how to perform self tibia IR mobs prior to standing up when he has been in a prolonged position  Assessment: Tolerated treatment well  Pt able to perform a full depth squat pain free at the end of tx  Patient would benefit from continued PT      Plan: Continue per plan of care         Precautions: hx CA    Daily Treatment Diary     Manual        Tissue deformation - distal quad (deep) LMR   LMR   LMR      Prone quad stretch   LMR LMR LMR LMR       s/l hip flexor stretch    LMR         s/l tissue deformation - distal ITB     LMR LMR       s/l tissue deformation - deep hip rotaters     LMR LMR       Supine left hip add stretch   LMR          S/l P-A gr 4 mobs to fib head      LMR       Supine gr 4 tibia IR mobs       LMR      Seated gr 4 tibia IR mobs       LMR      Gr 4 inf glide left hip   LMR          Lat distraction left hip with belt   LMR              Exercise Diary        u/l bridge - foot on p-ball 15"x3            Wall sits 15"x3            Quad smash (kb or lacrosse ball) Verbal            VG - b/l  L7 - 4'  L7 - 5'x2   L7 - 5'      VG - u/l  L7 - 3 x 7           Banded tke  Blue - fatigue x 3           tke with banded hip abd  orange - 3'           Standing hip hinge with dowel - deadlift  2'  40         Standing hip hinge with dowel - squat  2x20  40         Band assisted u/l squat   30 ea          Squat technique   5'          u/l squat - p-ball on wall    2x15         Banded side stepping    Blue - 50 ft x 2  verbal       Banded bridge     Blue - 2x25        Mini-SLR     10" - 2x5        VMO mini-SLR     10" - 2x5        Prone fig-4 glute activation      20"x5       Seated glute smash with lacrosse ball      verbal       Seated - tibia ir self mobs       LMR                       Modalities

## 2018-07-05 ENCOUNTER — OFFICE VISIT (OUTPATIENT)
Dept: PHYSICAL THERAPY | Facility: REHABILITATION | Age: 68
End: 2018-07-05
Payer: COMMERCIAL

## 2018-07-05 DIAGNOSIS — M76.52 PATELLAR TENDONITIS OF LEFT KNEE: Primary | ICD-10-CM

## 2018-07-05 PROCEDURE — 97110 THERAPEUTIC EXERCISES: CPT | Performed by: PHYSICAL THERAPIST

## 2018-07-05 PROCEDURE — 97140 MANUAL THERAPY 1/> REGIONS: CPT | Performed by: PHYSICAL THERAPIST

## 2018-07-05 NOTE — PROGRESS NOTES
Daily Note     Today's date: 2018  Patient name: Amara Forrester  : 1950  MRN: [id number]  Referring provider: Raegan Ramsay MD  Dx:   Encounter Diagnosis     ICD-10-CM    1  Patellar tendonitis of left knee M76 52        Start Time: 1545  Stop Time: 1630  Total time in clinic (min): 45 minutes    Subjective: Pt continues to c/o intermittent sx's localized to the left SIJ and lateral knee  The sx's are worse with left lateral SB  Objective: See treatment diary below  HEP updated with HHR, bridges (feet on p-ball), and bridges with u/l knee ext  Classical lx arom: flexion = wfl and no pain, extension = wfl and no pain, left SB = end-range pain, right SB = no sx's  Assessment: Tolerated treatment well  Patient exhibited good technique with therapeutic exercises    Plan: f/u in 2 weeks when pt returns from his work trip         Precautions: hx CA    Daily Treatment Diary     Manual   0705     Tissue deformation - distal quad (deep) LMR   LMR   LMR      Prone quad stretch   LMR LMR LMR LMR       s/l hip flexor stretch    LMR         s/l tissue deformation - distal ITB     LMR LMR       s/l tissue deformation - deep hip rotaters     LMR LMR       Supine left hip add stretch   LMR          S/l P-A gr 4 mobs to fib head      LMR       Supine gr 4 tibia IR mobs       LMR LMR     Seated gr 4 tibia IR mobs       LMR      S/l gr 5 gapping to L/S on left        LMR     Gr 4 inf glide left hip   LMR          Lat distraction left hip with belt   LMR              Exercise Diary   07/05     u/l bridge - foot on p-ball 15"x3            Wall sits 15"x3            Quad smash (kb or lacrosse ball) Verbal            VG - b/l  L7 - 4'  L7 - 5'x2   L7 - 5'      VG - u/l  L7 - 3 x 7           Banded tke  Blue - fatigue x 3           tke with banded hip abd  orange - 3'           Standing hip hinge with dowel - deadlift  2'  40 Standing hip hinge with dowel - squat  2x20  40         Band assisted u/l squat   30 ea          Squat technique   5'          u/l squat - p-ball on wall    2x15         Banded side stepping    Blue - 50 ft x 2  verbal       Banded bridge     Blue - 2x25        Mini-SLR     10" - 2x5        VMO mini-SLR     10" - 2x5        Prone fig-4 glute activation      20"x5       Seated glute smash with lacrosse ball      verbal       Seated - tibia ir self mobs       LMR      Bridge - feet on p-ball        20"x6     U/l bridge with contra-lateral knee ext        5"x7     HHR        30"x5                                                Modalities

## 2018-07-23 ENCOUNTER — OFFICE VISIT (OUTPATIENT)
Dept: PHYSICAL THERAPY | Facility: REHABILITATION | Age: 68
End: 2018-07-23
Payer: COMMERCIAL

## 2018-07-23 DIAGNOSIS — M76.52 PATELLAR TENDONITIS OF LEFT KNEE: Primary | ICD-10-CM

## 2018-07-23 PROCEDURE — 97164 PT RE-EVAL EST PLAN CARE: CPT | Performed by: PHYSICAL THERAPIST

## 2018-07-23 PROCEDURE — G8980 MOBILITY D/C STATUS: HCPCS | Performed by: PHYSICAL THERAPIST

## 2018-07-23 PROCEDURE — G8979 MOBILITY GOAL STATUS: HCPCS | Performed by: PHYSICAL THERAPIST

## 2018-07-23 NOTE — PROGRESS NOTES
Daily Note/Discharge    Today's date: 2018  Patient name: Marion Pemberton  : 1950  MRN: [id number]  Referring provider: Tahmina Carreno MD  Dx:   Encounter Diagnosis     ICD-10-CM    1  Patellar tendonitis of left knee M76 52        Start Time: 1730  Stop Time: 1800  Total time in clinic (min): 30 minutes     Pt has attended 9 sessions of PT from 18 to 18  Subjective: Pt states his sx's are not consistent  (He can go two weeks with no sx's ) However, when he does experience them, they are located on the lateral aspect of his left knee and he experiences brief moments where his knee may give out  He denies any other red flag sx's  Objective: See treatment diary below  Palpation:  TTP = fibular head  Special tests:  (-) valgus, (-) varus, (-) ant drawer, (-) post drawer  Supine left knee arom:  0 to 127  (no pain)  Functional testing:  Pt able to perform a b/l squat on most occassions without pain  Assessment: Tolerated treatment fair       Plan: d/c PT at this time and refer back to the ortho MD due to a plateau in progress       Goals  ST - I with HEP - MET  2 - perform a functional squat without pain - MET  LT - get in/out of the car without any pain or feeling of "instability" - PARTIALLY MET  2 - FOTO > 79 - ALMOST MET

## 2018-07-26 ENCOUNTER — OFFICE VISIT (OUTPATIENT)
Dept: OBGYN CLINIC | Facility: OTHER | Age: 68
End: 2018-07-26
Payer: COMMERCIAL

## 2018-07-26 VITALS
BODY MASS INDEX: 29.35 KG/M2 | HEART RATE: 71 BPM | DIASTOLIC BLOOD PRESSURE: 89 MMHG | SYSTOLIC BLOOD PRESSURE: 132 MMHG | WEIGHT: 171 LBS

## 2018-07-26 DIAGNOSIS — M25.562 ACUTE PAIN OF LEFT KNEE: Primary | ICD-10-CM

## 2018-07-26 PROCEDURE — 99214 OFFICE O/P EST MOD 30 MIN: CPT | Performed by: ORTHOPAEDIC SURGERY

## 2018-07-26 NOTE — PROGRESS NOTES
Assessment  Diagnoses and all orders for this visit:    Acute pain of left knee      Discussion and Plan:    The patient has a classic examination and clinical history for meniscal pathology given his lack of improvement with physical therapy activity modification and his preponderance of mechanical symptoms he is indicated for an MRI scan at this time to evaluate for meniscal pathology and we will see him after that study to discuss whether arthroscopic intervention is indicated  Subjective:   Patient ID: Silvino Olvera is a 80 y o  male      Patient presents for follow-up of his left knee pain  We show him back at April send him to physical therapy and was doing quite well until recently, he says his symptoms have changed he is now developing a catching and locking type sensation localized the medial lateral aspect of the left knee  It is worse when he gets up from a seated position and is associated with a sense of instability, this was not present previously  He has been in physical therapy and has reached a plateau as his symptoms persist despite the physical therapy activities  The following portions of the patient's history were reviewed and updated as appropriate: allergies, current medications, past family history, past medical history, past social history, past surgical history and problem list     Review of Systems   Constitutional: Negative for chills and fever  HENT: Negative for hearing loss  Eyes: Negative for visual disturbance  Respiratory: Negative for shortness of breath  Cardiovascular: Negative for chest pain  Gastrointestinal: Negative for abdominal pain  Musculoskeletal:        As reviewed in the HPI   Skin: Negative for rash  Neurological:        As reviewed in the HPI   Psychiatric/Behavioral: Negative for agitation  Objective:  Left Knee Exam   Swelling:  Moderate  Effusion: No    Tenderness   The patient is experiencing tenderness in the medial joint line, lateral joint line  Range of Motion   Extension: 0  Flexion:     120    Tests   McMurrays:  Medial - Positive      Lateral - Positive  Lachman:  Anterior - Negative      Drawer:       Posterior - Negative  Varus:  Negative  Valgus: Negative  Patellar Apprehension: No    Comments:  Small area of swelling over lateral joint line          Physical Exam   Constitutional: He is oriented to person, place, and time  He appears well-developed and well-nourished  HENT:   Head: Normocephalic and atraumatic  Neck: Normal range of motion  Neck supple  Cardiovascular: Normal rate and regular rhythm  Pulmonary/Chest: Effort normal  No respiratory distress  Abdominal: Soft  He exhibits no distension  Musculoskeletal:        Left knee: Medial joint line and lateral joint line tenderness noted  Neurological: He is alert and oriented to person, place, and time  Skin: Skin is warm and dry  Psychiatric: He has a normal mood and affect  His behavior is normal    Nursing note and vitals reviewed  I have personally reviewed pertinent films in PACS and my interpretation is as follows      Weight-bearing views both knees show minimal degenerative change

## 2018-07-31 ENCOUNTER — HOSPITAL ENCOUNTER (OUTPATIENT)
Dept: RADIOLOGY | Age: 68
Discharge: HOME | End: 2018-07-31
Payer: COMMERCIAL

## 2018-07-31 DIAGNOSIS — M25.562 ACUTE PAIN OF LEFT KNEE: ICD-10-CM

## 2018-07-31 PROCEDURE — 73721 MRI JNT OF LWR EXTRE W/O DYE: CPT

## 2018-08-02 ENCOUNTER — OFFICE VISIT (OUTPATIENT)
Dept: OBGYN CLINIC | Facility: OTHER | Age: 68
End: 2018-08-02
Payer: COMMERCIAL

## 2018-08-02 VITALS
SYSTOLIC BLOOD PRESSURE: 129 MMHG | WEIGHT: 170 LBS | DIASTOLIC BLOOD PRESSURE: 83 MMHG | HEART RATE: 69 BPM | BODY MASS INDEX: 29.18 KG/M2

## 2018-08-02 DIAGNOSIS — S83.272D COMPLEX TEAR OF LATERAL MENISCUS OF LEFT KNEE AS CURRENT INJURY, SUBSEQUENT ENCOUNTER: Primary | ICD-10-CM

## 2018-08-02 PROBLEM — S83.272A COMPLEX TEAR OF LATERAL MENISCUS OF LEFT KNEE AS CURRENT INJURY: Status: ACTIVE | Noted: 2018-07-26

## 2018-08-02 PROCEDURE — 99214 OFFICE O/P EST MOD 30 MIN: CPT | Performed by: ORTHOPAEDIC SURGERY

## 2018-08-02 NOTE — PATIENT INSTRUCTIONS
What to Expect Before and After Knee Arthroscopy  You are being scheduled for an outpatient knee arthroscopy by Dr Beatrice Law  This surgery is done most commonly to treat meniscal tears; however the technique allows Dr Beatrice Law to treat a variety of problems inside the knee with small incisions and using a scope/camera instrument  Here is some information which may help to answer questions that you may have  Please do not hesitate to reach out to our team to answer questions not addressed here  Before Surgery  You will be contacted the evening prior to your surgery to confirm the scheduled time of the procedure and when to arrive at the hospital    Do not eat or drink anything after midnight the night before your surgery so that the anesthesia can be performed safely  You typically do not knee any type of brace following the surgery unless specifically instructed by Dr Minda rogers   [facility] will meet the anesthesiologist the morning of the surgery  The surgery is performed under a general anesthetic (going to sleep) but they will also offer you a regional block (injection in the leg to numb the leg) to help control your post-operative pain  Unfortunately the block will eventually wear off (typically 12-24 hours after the surgery) but can still be very helpful in managing the pain early on  You will also be provided with a prescription for narcotic pain medication for a short period of time (5 day supply) as some patients require this for post-surgical pain control  We typically will not provide more of that medication at the follow up as to not risk causing dependency and the acute pain should be improved by then  After Surgery  The surgery typically takes 20-30 minutes  When surgery is completed, Dr Beatrice Law will update your family and friends on your condition and progress   You will remain in the recovery room for between 30-60 minutes or until the anesthesia has worn off and your blood pressure and pulse are stable  You will then be brought to the post-op area and see your family/friends  You will be discharged home with crutches and should be able to put all the weight on the leg that you can tolerated  The crutches should be able to be discontinued in 48-72 hours but if you need them for support for a little longer that is fine  As each surgery is unique, Dr Loyda Finn and his team will be clear if this is to change for your specific procedure  Ice applied to the knee for 20 minutes on and 20 minutes off is helpful to control pain and swelling for some patients and care should be given to make sure that ice is not in direct contact with the skin for fear of causing frostbite  The day after the surgery it is okay to remove the dressings and shower (it is okay if the incisions get wet, just try not to submerge them like in a bath for about 2 weeks following the surgery)  If you dont feel stable without the crutches to shower you can consider using a shower chair (plastic patio chair works well)  The incisions will have paper strips covering absorbable sutures and the strips will fall off over time  Make sure you dry everything after showering and a clean ace with a dry bandage (gauze from the pharmacy works great) can be placed after showering to prevent abrasion over the incisions but is not necessarily required  Most patients just leave the wounds open to the air and this is safe to do  It is normal to have some clear or even bloody drainage from the incisions but if you notice a foul odor or purulent drainage from your incision or your temperature goes above 101 5 degrees please contact the office to make sure an infection is not occurring  Pain Control    While pain is an individual experience and difficult to predict, a narcotic pain medication is often required to help manage the pain    A prescription for this will be provided but only a limited number of pills will be prescribed to help manage the acute phase of recovery  Outside of the acute phase (5 or so days), this medication will not be indicated  In addition to the narcotic pain medication, it is safe to use an anti-inflammatory (unless the patient has a medical condition that would not allow safe use of this mediation)  This includes the Advil, Motrin, Ibuprofen and Alleve category of medications  Simply follow the over the counter dosing on the package and use as indicated as another adjunct  Importantly since these medications are all very similar, use only one of them  Tylenol is a separate medication that can be utilized as well and can be taken at the same time as the other medication or given in a "staggered" manner  Just make sure that you follow the dosing on the over the counter bottle instructions  Also make sure that the pain medication prescribed by Dr Amita Cobb team does not contain acetaminophen (this is found in Percocet and Vicodin)  Typically we do not prescribe those types of pain medications but if for some reason that has been prescribed DO NOT add more Tylenol (acetaminophen) as you could end up taking too much of that medication  How to [place] in the First Week  You are encouraged to return to your normal eating and sleeping patterns as soon as possible  It is important for you to be active in order to control your weight and muscle tone so dont be afraid to move about the house as much as you can tolerate and even consider short walks using the crutches for support if needed  We will limit higher impact activities such as running for 4-6 weeks but gradually increasing your activity is safe as long as your knee does not start swelling up or causing pain  If that does happen back off of the activity  You might be able to return to work within several days however this differs from patient to patient  If your job requires heavy lifting, manual labor or climbing, there may be a delay for several weeks  Your first post-operative appointment will be with Dr Stacey Jama physician assistant (PA) a few days after the surgery where she will review the intra-operative pictures from the surgery and make sure things are going well  Please understand that it is quite common to still experience pain at that time but the pain should be steadily improving  The majority of our routine knee arthroscopy patients do not require formal Physical Therapy and a simple gradual return to activity is adequate  If you wish to attend PT or the details of you procedure will require you to do so, the first post-op visit is a perfect time to get that scheduled and started  Hopefully this has provided you with information that will help you prepare and get thru the procedure  Please do not hesitate to reach out to our team to answer questions not addressed here  Also remember that these are general guidelines and each surgery is unique so some changes to the above information may be required

## 2018-08-02 NOTE — PROGRESS NOTES
Assessment  Diagnoses and all orders for this visit:    Complex tear of lateral meniscus of left knee as current injury, subsequent encounter        Discussion and Plan:    The patient does have a tear of the left knee lateral meniscus and has failed to improve with appropriate nonoperative care  He is indicated for arthroscopic partial lateral meniscectomy and does wish to proceed forward  A thorough discussion was performed with the patient regarding the risks and benefits of the procedure  Risks specific to this procedure were discussed include but are not limited to infection, neurovascular injury, risk of anesthesia, recurrent tear of meniscus, persistent pain, no help for pre-existing osteoarthritis symptoms, as well as the need for further surgery  After this discussion all questions were answered and informed consent was obtained in the office for arthroscopic partial meniscectomy of the left knee  He does understand the parameniscal cyst may always be present but he is not concerned about the swelling it is the but can ankle symptoms he wishes to have treated  Subjective:   Patient ID: Marion Pemberton is a 80 y o  male      Patient presents for follow-up of his left knee MRI, he continues to have laterally based mechanical symptoms that he feels are exacerbating some left SI joint discomfort  He has failed to improve with injection and activity modification and states the pain is not as bad as the instability symptoms he is experiencing with ambulation            The following portions of the patient's history were reviewed and updated as appropriate: allergies, current medications, past family history, past medical history, past social history, past surgical history and problem list     Review of Systems   Constitutional: Negative for chills and fever  HENT: Negative for hearing loss  Eyes: Negative for visual disturbance  Respiratory: Negative for shortness of breath  Cardiovascular: Negative for chest pain  Gastrointestinal: Negative for abdominal pain  Musculoskeletal:        As reviewed in the HPI   Skin: Negative for rash  Neurological:        As reviewed in the HPI   Psychiatric/Behavioral: Negative for agitation  Objective:  Left Knee Exam   Swelling: None  Effusion: No    Tenderness   The patient is experiencing tenderness in the lateral joint line  Range of Motion   Extension: 0  Flexion:     130    Tests   McMurrays:  Medial - Negative      Lateral - Positive  Lachman:  Anterior - Negative      Drawer:       Posterior - Negative  Varus:  Negative  Valgus: Negative  Pivot Shift: Negative  Patellar Apprehension: No          Physical Exam   Constitutional: He is oriented to person, place, and time  He appears well-developed and well-nourished  HENT:   Head: Normocephalic and atraumatic  Neck: Normal range of motion  Neck supple  Cardiovascular: Normal rate, regular rhythm and normal heart sounds  Pulmonary/Chest: Effort normal and breath sounds normal  No respiratory distress  Abdominal: Soft  He exhibits no distension  Musculoskeletal:        Left knee: Lateral joint line tenderness noted  Neurological: He is alert and oriented to person, place, and time  Skin: Skin is warm and dry  Psychiatric: He has a normal mood and affect  His behavior is normal    Nursing note and vitals reviewed  I have personally reviewed pertinent films in PACS and my interpretation is as follows      Left knee MRI shows a complex tear of the body and anterior horn lateral meniscus with a small associated parameniscal cyst

## 2018-08-10 ENCOUNTER — OFFICE VISIT (OUTPATIENT)
Dept: LAB | Facility: CLINIC | Age: 68
End: 2018-08-10
Payer: COMMERCIAL

## 2018-08-10 DIAGNOSIS — S83.272A COMPLEX TEAR OF LATERAL MENISCUS OF LEFT KNEE AS CURRENT INJURY, INITIAL ENCOUNTER: ICD-10-CM

## 2018-08-10 LAB
ATRIAL RATE: 66 BPM
P AXIS: 68 DEGREES
PR INTERVAL: 138 MS
QRS AXIS: -39 DEGREES
QRSD INTERVAL: 98 MS
QT INTERVAL: 408 MS
QTC INTERVAL: 427 MS
T WAVE AXIS: 11 DEGREES
VENTRICULAR RATE: 66 BPM

## 2018-08-10 PROCEDURE — 93010 ELECTROCARDIOGRAM REPORT: CPT | Performed by: INTERNAL MEDICINE

## 2018-08-10 PROCEDURE — 93005 ELECTROCARDIOGRAM TRACING: CPT

## 2018-08-17 NOTE — PRE-PROCEDURE INSTRUCTIONS
Pre-Surgery Instructions:   Medication Instructions    aspirin 325 mg tablet Patient was instructed to contact Physician for medication instruction   cetirizine (ZYRTEC ALLERGY) 10 mg tablet Instructed patient per Anesthesia Guidelines   levofloxacin (LEVAQUIN) 500 mg tablet Instructed patient per Anesthesia Guidelines   levothyroxine 175 mcg tablet Instructed patient per Anesthesia Guidelines   metoprolol succinate (TOPROL-XL) 25 mg 24 hr tablet Instructed patient per Anesthesia Guidelines   Multiple Vitamin tablet Instructed patient per Anesthesia Guidelines   pantoprazole (PROTONIX) 40 mg tablet Instructed patient per Anesthesia Guidelines   simvastatin (ZOCOR) 40 mg tablet Instructed patient per Anesthesia Guidelines  REVIEWED  PRINTED SURGICAL INSTRUCTIONS WITH PATIENT , PATIENT VERBALIZED UNDERSTANDING  MEDICATIONS REVIEWED  ASA Med Class: Aspirin     Should be discontinued at least one week prior to planned operation, unless specifically stated otherwise by surgical service  Your Surgeon may have patient stop taking aspirin up to a week before surgery if having intracranial, middle ear, posterior eye, spine surgery or prostate surgery  [Patients taking aspirin for coronary stents should be reviewed by an anesthesiologist in the optimization clinic  Please do not discontinue aspirin in patients with coronary stents unless given specific permission to do so by the cardiologist who prescribed medication ]   If your surgeon approves please continue to take this medication on your normal schedule  You may take this medication on the morning of your surgery with a sip of water  Beta blocker Med Class     Continue to take this heart medication on your normal schedule  If this is an oral medication and you take it in the morning, then you may take this medicine with a sip of water  Statin Med Class     Continue to take this medication on your normal schedule    If this is an oral medication and you take it in the morning, then you may take this medicine with a sip of water  Thyroxine Med Class     Continue to take this medication on your normal schedule  If this is an oral medication and you take it in the morning, then you may take this medicine with a sip of water

## 2018-08-23 ENCOUNTER — ANESTHESIA EVENT (OUTPATIENT)
Dept: PERIOP | Facility: HOSPITAL | Age: 68
End: 2018-08-23
Payer: COMMERCIAL

## 2018-08-23 ENCOUNTER — APPOINTMENT (OUTPATIENT)
Dept: LAB | Facility: CLINIC | Age: 68
End: 2018-08-23
Payer: COMMERCIAL

## 2018-08-23 ENCOUNTER — TRANSCRIBE ORDERS (OUTPATIENT)
Dept: LAB | Facility: CLINIC | Age: 68
End: 2018-08-23

## 2018-08-23 DIAGNOSIS — S83.272A COMPLEX TEAR OF LATERAL MENISCUS OF LEFT KNEE AS CURRENT INJURY, INITIAL ENCOUNTER: ICD-10-CM

## 2018-08-23 LAB
ANION GAP SERPL CALCULATED.3IONS-SCNC: 10 MMOL/L (ref 4–13)
BASOPHILS # BLD AUTO: 0.03 THOUSANDS/ΜL (ref 0–0.1)
BASOPHILS NFR BLD AUTO: 1 % (ref 0–1)
BUN SERPL-MCNC: 20 MG/DL (ref 5–25)
CALCIUM SERPL-MCNC: 9 MG/DL (ref 8.3–10.1)
CHLORIDE SERPL-SCNC: 103 MMOL/L (ref 100–108)
CO2 SERPL-SCNC: 27 MMOL/L (ref 21–32)
CREAT SERPL-MCNC: 1.02 MG/DL (ref 0.6–1.3)
EOSINOPHIL # BLD AUTO: 0.24 THOUSAND/ΜL (ref 0–0.61)
EOSINOPHIL NFR BLD AUTO: 4 % (ref 0–6)
ERYTHROCYTE [DISTWIDTH] IN BLOOD BY AUTOMATED COUNT: 13.2 % (ref 11.6–15.1)
GFR SERPL CREATININE-BSD FRML MDRD: 76 ML/MIN/1.73SQ M
GLUCOSE SERPL-MCNC: 90 MG/DL (ref 65–140)
HCT VFR BLD AUTO: 47.9 % (ref 36.5–49.3)
HGB BLD-MCNC: 16.7 G/DL (ref 12–17)
IMM GRANULOCYTES # BLD AUTO: 0.01 THOUSAND/UL (ref 0–0.2)
IMM GRANULOCYTES NFR BLD AUTO: 0 % (ref 0–2)
LYMPHOCYTES # BLD AUTO: 0.66 THOUSANDS/ΜL (ref 0.6–4.47)
LYMPHOCYTES NFR BLD AUTO: 12 % (ref 14–44)
MCH RBC QN AUTO: 32.2 PG (ref 26.8–34.3)
MCHC RBC AUTO-ENTMCNC: 34.9 G/DL (ref 31.4–37.4)
MCV RBC AUTO: 92 FL (ref 82–98)
MONOCYTES # BLD AUTO: 0.49 THOUSAND/ΜL (ref 0.17–1.22)
MONOCYTES NFR BLD AUTO: 9 % (ref 4–12)
NEUTROPHILS # BLD AUTO: 4.1 THOUSANDS/ΜL (ref 1.85–7.62)
NEUTS SEG NFR BLD AUTO: 74 % (ref 43–75)
NRBC BLD AUTO-RTO: 0 /100 WBCS
PLATELET # BLD AUTO: 186 THOUSANDS/UL (ref 149–390)
PMV BLD AUTO: 9.4 FL (ref 8.9–12.7)
POTASSIUM SERPL-SCNC: 3.8 MMOL/L (ref 3.5–5.3)
RBC # BLD AUTO: 5.19 MILLION/UL (ref 3.88–5.62)
SODIUM SERPL-SCNC: 140 MMOL/L (ref 136–145)
WBC # BLD AUTO: 5.53 THOUSAND/UL (ref 4.31–10.16)

## 2018-08-23 PROCEDURE — 80048 BASIC METABOLIC PNL TOTAL CA: CPT

## 2018-08-23 PROCEDURE — 36415 COLL VENOUS BLD VENIPUNCTURE: CPT

## 2018-08-23 PROCEDURE — 85025 COMPLETE CBC W/AUTO DIFF WBC: CPT

## 2018-08-23 NOTE — PRE-PROCEDURE INSTRUCTIONS
Pre-Surgery Instructions:   Medication Instructions    aspirin 325 mg tablet Instructed patient per Anesthesia Guidelines   cetirizine (ZYRTEC ALLERGY) 10 mg tablet Instructed patient per Anesthesia Guidelines   levofloxacin (LEVAQUIN) 500 mg tablet Instructed patient per Anesthesia Guidelines   levothyroxine 175 mcg tablet Instructed patient per Anesthesia Guidelines   metoprolol succinate (TOPROL-XL) 25 mg 24 hr tablet Instructed patient per Anesthesia Guidelines   Multiple Vitamin tablet Instructed patient per Anesthesia Guidelines   pantoprazole (PROTONIX) 40 mg tablet Instructed patient per Anesthesia Guidelines   simvastatin (ZOCOR) 40 mg tablet Instructed patient per Anesthesia Guidelines  REVIEWED  PRINTED SURGICAL INSTRUCTIONS WITH PATIENT , PATIENT VERBALIZED UNDERSTANDING  MEDICATIONS REVIEWED  ASA Med Class: Aspirin     Should be discontinued at least one week prior to planned operation, unless specifically stated otherwise by surgical service  Your Surgeon may have patient stop taking aspirin up to a week before surgery if having intracranial, middle ear, posterior eye, spine surgery or prostate surgery  [Patients taking aspirin for coronary stents should be reviewed by an anesthesiologist in the optimization clinic  Please do not discontinue aspirin in patients with coronary stents unless given specific permission to do so by the cardiologist who prescribed medication ]   If your surgeon approves please continue to take this medication on your normal schedule  You may take this medication on the morning of your surgery with a sip of water  Beta blocker Med Class     Continue to take this heart medication on your normal schedule  If this is an oral medication and you take it in the morning, then you may take this medicine with a sip of water  Statin Med Class     Continue to take this medication on your normal schedule    If this is an oral medication and you take it in the morning, then you may take this medicine with a sip of water  Thyroxine Med Class     Continue to take this medication on your normal schedule  If this is an oral medication and you take it in the morning, then you may take this medicine with a sip of water

## 2018-08-24 ENCOUNTER — ANESTHESIA (OUTPATIENT)
Dept: PERIOP | Facility: HOSPITAL | Age: 68
End: 2018-08-24
Payer: COMMERCIAL

## 2018-08-24 ENCOUNTER — HOSPITAL ENCOUNTER (OUTPATIENT)
Facility: HOSPITAL | Age: 68
Setting detail: OUTPATIENT SURGERY
Discharge: HOME | End: 2018-08-24
Attending: ORTHOPAEDIC SURGERY | Admitting: ORTHOPAEDIC SURGERY
Payer: COMMERCIAL

## 2018-08-24 VITALS
TEMPERATURE: 97.5 F | WEIGHT: 167 LBS | HEART RATE: 78 BPM | RESPIRATION RATE: 20 BRPM | OXYGEN SATURATION: 96 % | SYSTOLIC BLOOD PRESSURE: 150 MMHG | BODY MASS INDEX: 28.51 KG/M2 | DIASTOLIC BLOOD PRESSURE: 98 MMHG | HEIGHT: 64 IN

## 2018-08-24 DIAGNOSIS — S83.272D COMPLEX TEAR OF LATERAL MENISCUS OF LEFT KNEE AS CURRENT INJURY, SUBSEQUENT ENCOUNTER: Primary | ICD-10-CM

## 2018-08-24 PROCEDURE — 29881 ARTHRS KNE SRG MNISECTMY M/L: CPT | Performed by: ORTHOPAEDIC SURGERY

## 2018-08-24 PROCEDURE — 29881 ARTHRS KNE SRG MNISECTMY M/L: CPT | Performed by: PHYSICIAN ASSISTANT

## 2018-08-24 RX ORDER — OXYCODONE HYDROCHLORIDE 5 MG/1
5 TABLET ORAL EVERY 4 HOURS PRN
Status: DISCONTINUED | OUTPATIENT
Start: 2018-08-24 | End: 2018-08-24 | Stop reason: HOSPADM

## 2018-08-24 RX ORDER — ACETAMINOPHEN 325 MG/1
650 TABLET ORAL EVERY 6 HOURS PRN
Status: DISCONTINUED | OUTPATIENT
Start: 2018-08-24 | End: 2018-08-24 | Stop reason: HOSPADM

## 2018-08-24 RX ORDER — LIDOCAINE HYDROCHLORIDE 10 MG/ML
INJECTION, SOLUTION INFILTRATION; PERINEURAL AS NEEDED
Status: DISCONTINUED | OUTPATIENT
Start: 2018-08-24 | End: 2018-08-24 | Stop reason: SURG

## 2018-08-24 RX ORDER — FENTANYL CITRATE/PF 50 MCG/ML
25 SYRINGE (ML) INJECTION
Status: DISCONTINUED | OUTPATIENT
Start: 2018-08-24 | End: 2018-08-24 | Stop reason: HOSPADM

## 2018-08-24 RX ORDER — OXYCODONE HYDROCHLORIDE 5 MG/1
TABLET ORAL
Qty: 12 TABLET | Refills: 0 | Status: SHIPPED | OUTPATIENT
Start: 2018-08-24 | End: 2018-12-02 | Stop reason: ALTCHOICE

## 2018-08-24 RX ORDER — PROPOFOL 10 MG/ML
INJECTION, EMULSION INTRAVENOUS AS NEEDED
Status: DISCONTINUED | OUTPATIENT
Start: 2018-08-24 | End: 2018-08-24 | Stop reason: SURG

## 2018-08-24 RX ORDER — SODIUM CHLORIDE, SODIUM LACTATE, POTASSIUM CHLORIDE, CALCIUM CHLORIDE 600; 310; 30; 20 MG/100ML; MG/100ML; MG/100ML; MG/100ML
125 INJECTION, SOLUTION INTRAVENOUS CONTINUOUS
Status: DISCONTINUED | OUTPATIENT
Start: 2018-08-24 | End: 2018-08-24 | Stop reason: HOSPADM

## 2018-08-24 RX ORDER — LABETALOL HYDROCHLORIDE 5 MG/ML
5 INJECTION, SOLUTION INTRAVENOUS
Status: DISCONTINUED | OUTPATIENT
Start: 2018-08-24 | End: 2018-08-24 | Stop reason: HOSPADM

## 2018-08-24 RX ORDER — ONDANSETRON 2 MG/ML
4 INJECTION INTRAMUSCULAR; INTRAVENOUS ONCE AS NEEDED
Status: DISCONTINUED | OUTPATIENT
Start: 2018-08-24 | End: 2018-08-24 | Stop reason: HOSPADM

## 2018-08-24 RX ORDER — ALBUTEROL SULFATE 2.5 MG/3ML
2.5 SOLUTION RESPIRATORY (INHALATION) ONCE AS NEEDED
Status: DISCONTINUED | OUTPATIENT
Start: 2018-08-24 | End: 2018-08-24 | Stop reason: HOSPADM

## 2018-08-24 RX ORDER — ONDANSETRON 2 MG/ML
4 INJECTION INTRAMUSCULAR; INTRAVENOUS EVERY 6 HOURS PRN
Status: DISCONTINUED | OUTPATIENT
Start: 2018-08-24 | End: 2018-08-24 | Stop reason: HOSPADM

## 2018-08-24 RX ORDER — FENTANYL CITRATE 50 UG/ML
INJECTION, SOLUTION INTRAMUSCULAR; INTRAVENOUS AS NEEDED
Status: DISCONTINUED | OUTPATIENT
Start: 2018-08-24 | End: 2018-08-24 | Stop reason: SURG

## 2018-08-24 RX ORDER — MEPERIDINE HYDROCHLORIDE 25 MG/ML
12.5 INJECTION INTRAMUSCULAR; INTRAVENOUS; SUBCUTANEOUS AS NEEDED
Status: DISCONTINUED | OUTPATIENT
Start: 2018-08-24 | End: 2018-08-24 | Stop reason: HOSPADM

## 2018-08-24 RX ORDER — ROPIVACAINE HYDROCHLORIDE 5 MG/ML
INJECTION, SOLUTION EPIDURAL; INFILTRATION; PERINEURAL AS NEEDED
Status: DISCONTINUED | OUTPATIENT
Start: 2018-08-24 | End: 2018-08-24 | Stop reason: SURG

## 2018-08-24 RX ORDER — BUPIVACAINE HYDROCHLORIDE 5 MG/ML
INJECTION, SOLUTION PERINEURAL AS NEEDED
Status: DISCONTINUED | OUTPATIENT
Start: 2018-08-24 | End: 2018-08-24 | Stop reason: HOSPADM

## 2018-08-24 RX ORDER — OXYCODONE HYDROCHLORIDE 5 MG/1
10 TABLET ORAL EVERY 4 HOURS PRN
Status: DISCONTINUED | OUTPATIENT
Start: 2018-08-24 | End: 2018-08-24 | Stop reason: HOSPADM

## 2018-08-24 RX ADMIN — SODIUM CHLORIDE, SODIUM LACTATE, POTASSIUM CHLORIDE, AND CALCIUM CHLORIDE 125 ML/HR: .6; .31; .03; .02 INJECTION, SOLUTION INTRAVENOUS at 09:49

## 2018-08-24 RX ADMIN — DEXAMETHASONE SODIUM PHOSPHATE 8 MG: 10 INJECTION INTRAMUSCULAR; INTRAVENOUS at 12:33

## 2018-08-24 RX ADMIN — CEFAZOLIN SODIUM 2000 MG: 2 SOLUTION INTRAVENOUS at 12:25

## 2018-08-24 RX ADMIN — PROPOFOL 200 MG: 10 INJECTION, EMULSION INTRAVENOUS at 12:29

## 2018-08-24 RX ADMIN — LIDOCAINE HYDROCHLORIDE 50 MG: 10 INJECTION, SOLUTION INFILTRATION; PERINEURAL at 12:29

## 2018-08-24 RX ADMIN — FENTANYL CITRATE 25 MCG: 50 INJECTION, SOLUTION INTRAMUSCULAR; INTRAVENOUS at 12:33

## 2018-08-24 RX ADMIN — FENTANYL CITRATE 25 MCG: 50 INJECTION, SOLUTION INTRAMUSCULAR; INTRAVENOUS at 12:37

## 2018-08-24 RX ADMIN — ROPIVACAINE HYDROCHLORIDE 20 ML: 5 INJECTION, SOLUTION EPIDURAL; INFILTRATION; PERINEURAL at 11:12

## 2018-08-24 NOTE — ANESTHESIA PROCEDURE NOTES
Peripheral Block    Patient location during procedure: holding area  Start time: 8/24/2018 11:05 AM  Reason for block: at surgeon's request and post-op pain management  Staffing  Anesthesiologist: Ricardo De Luna  Performed: anesthesiologist   Preanesthetic Checklist  Completed: patient identified, site marked, surgical consent, pre-op evaluation, timeout performed, IV checked, risks and benefits discussed and monitors and equipment checked  Peripheral Block  Patient position: supine  Prep: ChloraPrep  Patient monitoring: continuous pulse ox, frequent blood pressure checks, cardiac monitor and heart rate  Block type: adductor canal block  Laterality: left  Injection technique: single-shot  Procedures: ultrasound guided  Ultrasound permanent image saved  Needle  Needle type: Stimuplex   Needle gauge: 22 G  Needle length: 10 cm  Needle localization: ultrasound guidance  Needle insertion depth: 5 cm  Assessment  Injection assessment: incremental injection, local visualized surrounding nerve on ultrasound, negative aspiration for heme and no paresthesia on injection  Paresthesia pain: none  Heart rate change: no  Slow fractionated injection: yes  Post-procedure:  site cleaned  patient tolerated the procedure well with no immediate complications

## 2018-08-24 NOTE — DISCHARGE INSTRUCTIONS
What to Expect Before and After Knee Arthroscopy  You are being scheduled for an outpatient knee arthroscopy by Dr Marifer Milladr  This surgery is done most commonly to treat meniscal tears; however the technique allows Dr Marifer Millard to treat a variety of problems inside the knee with small incisions and using a scope/camera instrument  Here is some information which may help to answer questions that you may have  Please do not hesitate to reach out to our team to answer questions not addressed here  Before Surgery  You will be contacted the evening prior to your surgery to confirm the scheduled time of the procedure and when to arrive at the hospital    Do not eat or drink anything after midnight the night before your surgery so that the anesthesia can be performed safely  You typically do not knee any type of brace following the surgery unless specifically instructed by Dr Mariah Bingham team   Paige Anderson will meet the anesthesiologist the morning of the surgery  The surgery is performed under a general anesthetic (going to sleep) but they will also offer you a regional block (injection in the leg to numb the leg) to help control your post-operative pain  Unfortunately the block will eventually wear off (typically 12-24 hours after the surgery) but can still be very helpful in managing the pain early on  You will also be provided with a prescription for narcotic pain medication for a short period of time (5 day supply) as some patients require this for post-surgical pain control  We typically will not provide more of that medication at the follow up as to not risk causing dependency and the acute pain should be improved by then  After Surgery  The surgery typically takes 20-30 minutes  When surgery is completed, Dr Marifer Millard will update your family and friends on your condition and progress   You will remain in the recovery room for between 30-60 minutes or until the anesthesia has worn off and your blood pressure and pulse are stable  You will then be brought to the post-op area and see your family/friends  You will be discharged home with crutches and should be able to put all the weight on the leg that you can tolerated  The crutches should be able to be discontinued in 48-72 hours but if you need them for support for a little longer that is fine  As each surgery is unique, Dr Char Powell and his team will be clear if this is to change for your specific procedure  Ice applied to the knee for 20 minutes on and 20 minutes off is helpful to control pain and swelling for some patients and care should be given to make sure that ice is not in direct contact with the skin for fear of causing frostbite  The day after the surgery it is okay to remove the dressings and shower (it is okay if the incisions get wet, just try not to submerge them like in a bath for about 2 weeks following the surgery)  If you dont feel stable without the crutches to shower you can consider using a shower chair (plastic patio chair works well)  The incisions will have paper strips covering absorbable sutures and the strips will fall off over time  Make sure you dry everything after showering and a clean ace with a dry bandage (gauze from the pharmacy works great) can be placed after showering to prevent abrasion over the incisions but is not necessarily required  Most patients just leave the wounds open to the air and this is safe to do  It is normal to have some clear or even bloody drainage from the incisions but if you notice a foul odor or purulent drainage from your incision or your temperature goes above 101 5 degrees please contact the office to make sure an infection is not occurring  Pain Control    While pain is an individual experience and difficult to predict, a narcotic pain medication is often required to help manage the pain    A prescription for this will be provided but only a limited number of pills will be prescribed to help manage the acute phase of recovery  Outside of the acute phase (5 or so days), this medication will not be indicated  In addition to the narcotic pain medication, it is safe to use an anti-inflammatory (unless the patient has a medical condition that would not allow safe use of this mediation)  This includes the Advil, Motrin, Ibuprofen and Alleve category of medications  Simply follow the over the counter dosing on the package and use as indicated as another adjunct  Importantly since these medications are all very similar, use only one of them  Tylenol is a separate medication that can be utilized as well and can be taken at the same time as the other medication or given in a "staggered" manner  Just make sure that you follow the dosing on the over the counter bottle instructions  Also make sure that the pain medication prescribed by Dr Christelle Gill team does not contain acetaminophen (this is found in Percocet and Vicodin)  Typically we do not prescribe those types of pain medications but if for some reason that has been prescribed DO NOT add more Tylenol (acetaminophen) as you could end up taking too much of that medication  How to [place] in the First Week  You are encouraged to return to your normal eating and sleeping patterns as soon as possible  It is important for you to be active in order to control your weight and muscle tone so dont be afraid to move about the house as much as you can tolerate and even consider short walks using the crutches for support if needed  We will limit higher impact activities such as running for 4-6 weeks but gradually increasing your activity is safe as long as your knee does not start swelling up or causing pain  If that does happen back off of the activity  You might be able to return to work within several days however this differs from patient to patient  If your job requires heavy lifting, manual labor or climbing, there may be a delay for several weeks  Your first post-operative appointment will be with Dr Sandi Mckeon physician assistant (PA) a few days after the surgery where she will review the intra-operative pictures from the surgery and make sure things are going well  Please understand that it is quite common to still experience pain at that time but the pain should be steadily improving  The majority of our routine knee arthroscopy patients do not require formal Physical Therapy and a simple gradual return to activity is adequate  If you wish to attend PT or the details of you procedure will require you to do so, the first post-op visit is a perfect time to get that scheduled and started  Hopefully this has provided you with information that will help you prepare and get thru the procedure  Please do not hesitate to reach out to our team to answer questions not addressed here  Also remember that these are general guidelines and each surgery is unique so some changes to the above information may be required  Crutch Instructions   WHAT YOU NEED TO KNOW:   Crutches are tools that provide support and balance when you walk  You may need 1 or 2 crutches to help support your body weight  You may need crutches if you had surgery or an injury that affects your ability to walk  DISCHARGE INSTRUCTIONS:   How to use crutches safely:   · Support your weight with your arms and hands  Do not support your weight with your armpits  This could hurt the nerves that are in your armpits  Keep your elbow bent when the crutch is in place under your arm  · Walk slowly and carefully with crutches  Go up and down stairs and ramps slowly, and stop to rest when you feel tired  Get up slowly to a sitting or standing position  This will help prevent dizziness and fainting  Use your crutches only on firm ground  Use caution when you walk on ice or snow  Wet or waxed floors and smooth cement floors can be slippery  Watch out for small rugs or cords    How to walk with crutches:   · Place both crutches under your arms, and place your hands on the hand  of the crutches  Place your crutches slightly in front of you  · The top of the crutches should be about 2 fingers [id number] (about 1½ inches) below your armpits  Place your weight on your hands  The top of the crutches should not press into your armpits  · If you have one leg that is injured, keep it off the floor by bending your knee  · Lift the crutches and move them a step ahead of you  Put the rubber ends of the crutches firmly on the ground  Move the foot that is not injured between the crutches  Place that heel down first     · If you are using your crutches for balance, move your right foot and left crutch forward  Then move your left foot and right crutch forward  Keep walking this way  How to go up stairs with crutches:   · Face the stairs  Put the crutches close to the first step  · Push onto the crutches and put your uninjured leg on the first step  · Put your weight on your uninjured leg that is on the first step  Bring both crutches and the injured leg onto the step at the same time  · When you hold onto a railing with one arm, put both crutches under the other arm  Use the railing to help you go up stairs  How to go down stairs with crutches:   · Stand with the toes of your uninjured leg close to the edge of the step  · Bend the knee of your uninjured leg  Slowly lower both crutches along with the injured leg onto the next step  · Lean on your crutches  Slowly lower your uninjured leg onto the same step  · Place both crutches under one arm while you hold onto the railing with the other arm  How to sit in a chair with crutches:   · Turn and back up to the chair until you feel the edge of it against the back of your legs  Keep your injured leg forward  · Take your crutches out from under your arms  Sit while bending your uninjured knee         How to get up from a chair with crutches:   · Sit on the edge of your chair  · Push up with your hands using the crutches or arms of the chair  Put your weight on your uninjured foot as you get up  · Keep your injured leg bent at the knee and off the floor  Contact your healthcare provider if:   · Your crutches do not fit  · One crutch is longer than the other  · Your crutches break or get lost     · The rubber tips of your crutches are split or loose  · You get blisters or painful calluses on your hands or armpits  · Your armpit is red, sore, or has bumps or pimples  · Your arm muscles get weaker the longer you use the crutches  · You have questions or concerns about your condition or care  Seek care immediately or call 911 if:   · You have sudden numbness in a hand or arm  · Your fingers feel cold or have cramping pain  © 2017 [facility] Information is for End User's use only and may not be sold, redistributed or otherwise used for commercial purposes  All illustrations and images included in CareNotes® are the copyrighted property of [employer]  or Chai Baker  The above information is an  only  It is not intended as medical advice for individual conditions or treatments  Talk to your doctor, nurse or pharmacist before following any medical regimen to see if it is safe and effective for you

## 2018-08-24 NOTE — ANESTHESIA POSTPROCEDURE EVALUATION
Post-Op Assessment Note      CV Status:  Stable    Mental Status:  Alert and awake    Hydration Status:  Euvolemic    PONV Controlled:  Controlled    Airway Patency:  Patent and adequate    Post Op Vitals Reviewed: Yes          Staff: CRNA           /89 (08/24/18 1315)    Temp (!) 97 1 °F (36 2 °C) (08/24/18 1314)    Pulse 68 (08/24/18 1315)   Resp 15 (08/24/18 1315)    SpO2 97 % (08/24/18 1315)

## 2018-08-24 NOTE — ANESTHESIA PREPROCEDURE EVALUATION
Review of Systems/Medical History  Patient summary reviewed        Cardiovascular  Negative cardio ROS Exercise tolerance (METS): >4,  Hyperlipidemia, Hypertension ,    Pulmonary  Negative pulmonary ROS        GI/Hepatic  Negative GI/hepatic ROS   GERD ,        Negative  ROS        Endo/Other  Negative endo/other ROS History of thyroid disease , hypothyroidism,      GYN  Negative gynecology ROS          Hematology  Negative hematology ROS      Musculoskeletal  Negative musculoskeletal ROS        Neurology  Negative neurology ROS      Psychology   Negative psychology ROS              Physical Exam    Airway    Mallampati score: II  TM Distance: >3 FB  Neck ROM: full     Dental   No notable dental hx     Cardiovascular  Comment: Negative ROS,     Pulmonary      Other Findings        Anesthesia Plan  ASA Score- 2     Anesthesia Type- general and regional with ASA Monitors  Additional Monitors:   Airway Plan:     Comment: Patient seen and examined, history reviewed  Patient to be done under general anesthesia with LMA and routine monitors  Adductor canal block for post op pain control  Risks discussed with the patient, consent obtained        Plan Factors-    Induction- intravenous  Postoperative Plan-     Informed Consent- Anesthetic plan and risks discussed with patient  I personally reviewed this patient with the CRNA  Discussed and agreed on the Anesthesia Plan with the CRNA  Woody Montano

## 2018-08-24 NOTE — H&P
I identified and marked the patient in the pre-op holding area after confirming the surgical consent  No changes to medical health since the H&P was preformed  The patient's prescription history was [facility] the [facility] to ensure compliance with applicable state laws

## 2018-08-24 NOTE — OP NOTE
OPERATIVE REPORT  PATIENT NAME: Lynn Reddy    :  1950  MRN: [id number]  Pt Location: [facility] OR ROOM 15    SURGERY DATE: 2018     SURGEON: Oscar Barton MD     ASSISTANT: Analilia Rg PA-C     NOTE: Analilia Rg PA-C was present for the entire procedure and provided essential assistance with patient positioning, prepping, draping, Patient positioning, draping, wound closure, sterile dressing application under my direct supervision    NOTE: No qualified resident physician was available for assistance    PRE-OP DIAGNOSIS: Left Knee Lateral Meniscus Tear    POST-OP DIAGNOSIS: Left Knee Lateral Meniscus Tear    PROCEDURE(S): Surgical Arthroscopy Left Knee with Partial Lateral Meniscectomy    ANESTHESIA STAFF: Kimberly Leyva MD     ANESTHESIA TYPE: General with LMA with Adductor Canal Block    COMPLICATIONS: None    FINDINGS: Lateral Meniscus Tear    SPECIMEN(S):  None    ESTIMATED BLOOD LOSS: Minimal    OPERATIVE INDICATIONS:  Patient is a 80 y o   male with left knee pain and MRI scan showing a lateral meniscus tear  After a thorough discussion of the risks and benefits of the procedure as well as alternatives to the procedure the patient elected for surgical arthroscopy left knee with partial lateral meniscectomy  PROCEDURE AND TECHNIQUE:  On the day of surgery I identified the patient's left knee and marked it with my initials  The patient was taken back to the operating room where general anesthesia was induced with a laryngeal mask airway and 2 grams of IV Cefazolin were given  The patient was positioned in the supine position, the knee was examined and found to have a no effusion with full range of motion and no instability  A tourniquet was not utilized and after a timeout for safety a standard anterolateral arthroscopic portal was made  No effusion was returned  Patellofemoral evaluation revealed minimal degenerative change and a centrally tracking patella   The medial compartment was entered and minimal degenerative change with no medial meniscus tear  The ACL and PCL were found to be intact in the inter-condylar notch  The lateral compartment was entered and found to have mild degenerative change with a radial tear of the posterior horn/body junction of the lateral meniscus  A biting device and a shaver were introduced and a partial lateral meniscectomy was performed to a stable border  No loose bodies were seen in the lateral/medial gutter or suprapatellar pouch  The knee was irrigated and then the scope was withdrawn  Wounds were closed with 4-0 Monocryl and Steri-Strips and sterile dressings were placed  Patient will be weightbearing with crutches for support   20 cc of 1/4% bupivacaine was injected for post-op analgesia       PATIENT DISPOSITION:  Stable to PACU      SIGNATURE: Audelia Lloyd MD  DATE: August 24, 2018  TIME: 1:05 PM

## 2018-08-24 NOTE — PERIOPERATIVE NURSING NOTE
VSS, pt denies pain or nausea, assessment unchanged, report called, no questions, pt transferred to [facility]

## 2018-08-27 ENCOUNTER — OFFICE VISIT (OUTPATIENT)
Dept: OBGYN CLINIC | Facility: HOSPITAL | Age: 68
End: 2018-08-27

## 2018-08-27 VITALS
BODY MASS INDEX: 29.6 KG/M2 | SYSTOLIC BLOOD PRESSURE: 145 MMHG | HEART RATE: 73 BPM | DIASTOLIC BLOOD PRESSURE: 98 MMHG | HEIGHT: 64 IN | WEIGHT: 173.4 LBS

## 2018-08-27 DIAGNOSIS — Z47.89 AFTERCARE FOLLOWING SURGERY OF THE MUSCULOSKELETAL SYSTEM: Primary | ICD-10-CM

## 2018-08-27 PROCEDURE — 99024 POSTOP FOLLOW-UP VISIT: CPT | Performed by: PHYSICIAN ASSISTANT

## 2018-08-27 RX ORDER — ACETAMINOPHEN 500 MG
500 TABLET ORAL EVERY 6 HOURS PRN
COMMUNITY
End: 2021-11-17

## 2018-08-27 NOTE — PROGRESS NOTES
Assessment:       1  Aftercare following surgery of the musculoskeletal system          Plan:        Patient is doing well postoperatively  Operative note and rehab protocol were discussed  All questions were addressed to the patient's satisfaction  He has been advised to avoid running or jumping for 3 weeks; however, he can perform activities of daily living as tolerated  Follow-up will be in 4 weeks to assess patient's progress  We will consider the need for PT at that time  Subjective:     Patient ID: Mary Ellen Morales is a 80 y o  male  Chief Complaint:    HPI  Patient presents to the office 3 days status post left knee arthroscopy with partial lateral meniscectomy, 08/24/2018  He is doing well and is pleasantly surprised that he has no pain at this point  He does complain of stiffness and numbness in his lower extremity  Social History     Occupational History    Not on file  Social History Main Topics    Smoking status: Former Smoker    Smokeless tobacco: Never Used    Alcohol use No    Drug use: No    Sexual activity: Not on file      Review of Systems   Respiratory: Negative  Cardiovascular:        No calf tenderness reported  Musculoskeletal: Positive for joint swelling  Skin: Positive for wound  Neurological: Positive for weakness and numbness  Psychiatric/Behavioral: Negative  Objective:         Neurological Testing     Additional Neurological Details  Diffuse decreased sensation noted below the left knee  Pulses intact, no calf tenderness  Physical Exam   Constitutional: He is oriented to person, place, and time  He appears well-developed and well-nourished  HENT:   Head: Normocephalic  Cardiovascular: Intact distal pulses  Pulmonary/Chest: Effort normal    Musculoskeletal: He exhibits edema  He exhibits no tenderness  Patient has -5 degrees of extension and can flex to 100°    No calf tenderness noted   Neurological: He is alert and oriented to person, place, and time  Diffuse decreased sensation noted below the left knee  Pulses intact, no calf tenderness  Skin: Skin is warm and dry  Incisions dry and clean  Psychiatric: He has a normal mood and affect   His behavior is normal

## 2018-08-27 NOTE — PATIENT INSTRUCTIONS
1  No running or jumping for 3 weeks  2  Activities of daily living as tolerated  3   Follow-up in 4 weeks

## 2018-09-18 ENCOUNTER — TRANSCRIBE ORDERS (OUTPATIENT)
Dept: ADMINISTRATIVE | Facility: HOSPITAL | Age: 68
End: 2018-09-18

## 2018-09-18 DIAGNOSIS — I25.10 CORONARY ARTERY DISEASE INVOLVING NATIVE CORONARY ARTERY OF NATIVE HEART WITHOUT ANGINA PECTORIS: ICD-10-CM

## 2018-09-18 DIAGNOSIS — I10 ESSENTIAL HYPERTENSION: ICD-10-CM

## 2018-09-18 DIAGNOSIS — Z09 FOLLOW UP: Primary | ICD-10-CM

## 2018-09-20 ENCOUNTER — HOSPITAL ENCOUNTER (OUTPATIENT)
Dept: NON INVASIVE DIAGNOSTICS | Facility: CLINIC | Age: 68
Discharge: HOME | End: 2018-09-20
Payer: COMMERCIAL

## 2018-09-20 DIAGNOSIS — I25.10 CORONARY ARTERY DISEASE INVOLVING NATIVE CORONARY ARTERY OF NATIVE HEART WITHOUT ANGINA PECTORIS: ICD-10-CM

## 2018-09-20 DIAGNOSIS — I10 ESSENTIAL HYPERTENSION: ICD-10-CM

## 2018-09-20 DIAGNOSIS — Z09 FOLLOW UP: ICD-10-CM

## 2018-09-20 LAB
ARRHY DURING EX: NORMAL
CHEST PAIN STATEMENT: NORMAL
MAX DIASTOLIC BP: 90 MMHG
MAX HEART RATE: 134 BPM
MAX PREDICTED HEART RATE: 153 BPM
MAX. SYSTOLIC BP: 168 MMHG
PROTOCOL NAME: NORMAL
REASON FOR TERMINATION: NORMAL
TARGET HR FORMULA: NORMAL
TEST INDICATION: NORMAL
TIME IN EXERCISE PHASE: NORMAL

## 2018-09-20 PROCEDURE — 78452 HT MUSCLE IMAGE SPECT MULT: CPT

## 2018-09-20 PROCEDURE — A9502 TC99M TETROFOSMIN: HCPCS

## 2018-09-20 PROCEDURE — 93017 CV STRESS TEST TRACING ONLY: CPT

## 2018-09-20 PROCEDURE — 93018 CV STRESS TEST I&R ONLY: CPT | Performed by: INTERNAL MEDICINE

## 2018-09-20 PROCEDURE — 78452 HT MUSCLE IMAGE SPECT MULT: CPT | Performed by: INTERNAL MEDICINE

## 2018-09-20 PROCEDURE — 93016 CV STRESS TEST SUPVJ ONLY: CPT | Performed by: INTERNAL MEDICINE

## 2018-09-24 ENCOUNTER — OFFICE VISIT (OUTPATIENT)
Dept: OBGYN CLINIC | Facility: HOSPITAL | Age: 68
End: 2018-09-24

## 2018-09-24 VITALS
WEIGHT: 173.6 LBS | SYSTOLIC BLOOD PRESSURE: 132 MMHG | DIASTOLIC BLOOD PRESSURE: 85 MMHG | BODY MASS INDEX: 29.8 KG/M2 | HEART RATE: 82 BPM

## 2018-09-24 DIAGNOSIS — Z47.89 AFTERCARE FOLLOWING SURGERY OF THE MUSCULOSKELETAL SYSTEM: Primary | ICD-10-CM

## 2018-09-24 PROCEDURE — 99024 POSTOP FOLLOW-UP VISIT: CPT | Performed by: PHYSICIAN ASSISTANT

## 2018-09-24 RX ORDER — AMLODIPINE BESYLATE 5 MG/1
5 TABLET ORAL DAILY
COMMUNITY
Start: 2018-09-18 | End: 2022-04-12

## 2018-09-24 RX ORDER — ALPRAZOLAM 0.5 MG/1
TABLET ORAL
COMMUNITY
Start: 2018-09-18 | End: 2018-12-02 | Stop reason: ALTCHOICE

## 2018-09-24 NOTE — PROGRESS NOTES
Assessment:       1  Aftercare following surgery of the musculoskeletal system          Plan:        Patient is doing well postoperatively  Operative note, images, and rehab protocol were discussed  All questions were addressed to the patient's satisfaction  Patient is to make appointment with PT  Follow-up will be in 6 weeks to assess patient's progress  Subjective:     Patient ID: Frannie Joseph is a 80 y o  male  Chief Complaint:    HPI  The patient presents to the office approximately 4 weeks status post left leg arthroscopic surgery with lateral meniscectomy  Patient was noted to have osteoarthritis at the time of surgery as well  He states he is doing well and performing activities of daily living  However, he complains of pain in his left thigh and left calf area  This pain occurs mostly when he is trying to stand up from sitting position or from a squatting position  He has been able to walk and ride his bike with little to pain  Social History     Occupational History    Not on file  Social History Main Topics    Smoking status: Former Smoker    Smokeless tobacco: Never Used    Alcohol use No    Drug use: No    Sexual activity: Not on file      Review of Systems   Respiratory: Negative  Musculoskeletal: Positive for myalgias  Negative for arthralgias  Skin: Negative for wound  Neurological: Positive for weakness  Negative for numbness  Psychiatric/Behavioral: Negative  Objective:         Neurological Testing     Additional Neurological Details  Motor and sensation grossly intact  Physical Exam   Constitutional: He is oriented to person, place, and time  He appears well-developed and well-nourished  HENT:   Head: Normocephalic  Cardiovascular: Intact distal pulses  Pulmonary/Chest: Effort normal    Musculoskeletal: Normal range of motion  He exhibits no edema or deformity  Anterior bilateral joint line tenderness on palpation    Negative Lachman's  Negative anterior drawer sign  Lateral collateral ligaments stable  Neurological: He is alert and oriented to person, place, and time  Motor and sensation grossly intact  Skin: Skin is warm and dry  Incisions well healed  Psychiatric: He has a normal mood and affect

## 2018-09-25 ENCOUNTER — EVALUATION (OUTPATIENT)
Dept: PHYSICAL THERAPY | Facility: REHABILITATION | Age: 68
End: 2018-09-25
Payer: COMMERCIAL

## 2018-09-25 ENCOUNTER — TELEPHONE (OUTPATIENT)
Dept: OBGYN CLINIC | Facility: HOSPITAL | Age: 68
End: 2018-09-25

## 2018-09-25 VITALS — DIASTOLIC BLOOD PRESSURE: 58 MMHG | HEART RATE: 89 BPM | SYSTOLIC BLOOD PRESSURE: 108 MMHG

## 2018-09-25 DIAGNOSIS — Z98.890 S/P LATERAL MENISCUS REPAIR OF LEFT KNEE: Primary | ICD-10-CM

## 2018-09-25 DIAGNOSIS — Z98.890 S/P MEDIAL MENISCECTOMY OF LEFT KNEE: ICD-10-CM

## 2018-09-25 PROCEDURE — G8978 MOBILITY CURRENT STATUS: HCPCS | Performed by: PHYSICAL THERAPIST

## 2018-09-25 PROCEDURE — 97161 PT EVAL LOW COMPLEX 20 MIN: CPT | Performed by: PHYSICAL THERAPIST

## 2018-09-25 PROCEDURE — G8979 MOBILITY GOAL STATUS: HCPCS | Performed by: PHYSICAL THERAPIST

## 2018-09-25 PROCEDURE — 97110 THERAPEUTIC EXERCISES: CPT | Performed by: PHYSICAL THERAPIST

## 2018-09-25 PROCEDURE — 97140 MANUAL THERAPY 1/> REGIONS: CPT | Performed by: PHYSICAL THERAPIST

## 2018-09-25 NOTE — PROGRESS NOTES
PT Evaluation     Today's date: 2018  Patient name: Lexi Montes  : 1950  MRN: [id number]  Referring provider: Romeo San MD  Dx:   Encounter Diagnosis     ICD-10-CM    1  S/P lateral meniscus repair of left knee Z98 890    2  S/P medial meniscectomy of left knee Z98 890        Start Time: 69  Stop Time: 1730  Total time in clinic (min): 73 minutes    Assessment  Impairments: abnormal gait, abnormal muscle firing, abnormal or restricted ROM, activity intolerance, impaired balance, impaired physical strength, lacks appropriate home exercise program and pain with function    Assessment details: Lexi Montes is a 80 y o  male who presents with pain, decreased strength, decreased ROM, decreased joint mobility, joint effusion and ambulatory dysfunction  Due to these impairments, Patient has difficulty performing a/iadls, recreational activities, work-related activities and engaging in social activities  Patient's clinical presentation is consistent with their referring diagnosis of [unfilled]  Patient would benefit from skilled physical therapy to address their aforementioned impairments, improve their level of function and to improve their overall quality of life  Understanding of Dx/Px/POC: excellent  Goals  Short Term:  Pt will report decreased levels of pain by at least 2 subjective ratings in 4 weeks  Pt will demonstrate improved ROM by at least 5 degrees in 4 weeks  Long Term:   Pt will be independent in their HEP in 8 weeks  Pt will demonstrate improved FOTO, > 67  Pt will return to performing all transfers with 0/10 pain  Pt will perform all ADL's without pain or limitation  Pt will return to prior recreational activities without limitation       Plan  Patient would benefit from: PT eval and skilled physical therapy  Planned modality interventions: low level laser therapy and unattended electrical stimulation  Planned therapy interventions: balance, home exercise program, transfer training, therapeutic exercise, therapeutic activities, stretching, strengthening, patient education, neuromuscular re-education, manual therapy and joint mobilization  Frequency: 2x week  Duration in weeks: 10  Treatment plan discussed with: patient        Subjective Evaluation    History of Present Illness  Date of surgery: 2018  Mechanism of injury: Unknown   Not a recurrent problem   Quality of life: fair    Pain  Current pain rating: 3  At best pain rating: 3  At worst pain ratin  Quality: sharp and dull ache  Aggravating factors: standing  Progression: no change    Social Support  Steps to enter house: no  Lives in: multiple-level home  Lives with: spouse    Employment status: working    Diagnostic Tests  X-ray: normal  MRI studies: normal  Treatments  No previous or current treatments  Patient Goals  Patient goals for therapy: increased strength, independence with ADLs/IADLs, improved balance, increased motion, decreased pain, decreased edema and return to sport/leisure activities          Objective     Static Posture     Hip   Hip (Left): Increased flexion  Tenderness     Left Hip   Iliac crest: hip flexor tenderness LLE  Active Range of Motion   Left Hip   Flexion: 76 degrees   Extension: 12 degrees   Left Knee   Flexion: 113 degrees   Extension: 3 degrees     Right Knee   Flexion: 122 degrees   Extension: 0 degrees     Passive Range of Motion   Left Knee   Flexion: 121 degrees   Extension: 0 degrees     Mobility   Patellar Mobility:   Left Knee   Hypomobile: left medial, left lateral and left inferior    Strength/Myotome Testing     Left Knee   Flexion: 3+  Extension: 3+  Quadriceps contraction: fair    Right Knee   Flexion: 5  Prone flexion: 5  Extension: 5    Additional Strength Details  MMT wihtin available ROM    Tests     Left Hip   Rishi: Positive       Swelling     Left Knee Girth Measurement (cm)   Joint line: 39 cm    Right Knee Girth Measurement (cm)   Joint line: 36 cm    Ambulation   Weight-Bearing Status   Weight-Bearing Status (Left): flat foot weight-bearing   Assistive device used: none    Ambulation: Level Surfaces   Ambulation without assistive device: independent    Quality of Movement During Gait   Trunk    Trunk (Left): Positive left rotated forward  Trunk (Right): Positive lateral lean over stance limb  Knee    Knee (Left): Positive increased flexion during stance             Precautions: 2    Daily Treatment Diary     Manual                                                                                   Exercise Diary                                                                                                                                                                                                                                                                                      Modalities

## 2018-09-25 NOTE — TELEPHONE ENCOUNTER
Caller: patient  Callback# [phone]  Dr Gabriella Anand        Patient forgot to ask a question at office visit 09/24  Can patient soak in tube and get left knee wet? Please advise thanks

## 2018-09-26 ENCOUNTER — APPOINTMENT (OUTPATIENT)
Dept: LAB | Facility: CLINIC | Age: 68
End: 2018-09-26
Payer: COMMERCIAL

## 2018-09-26 DIAGNOSIS — N52.9 MALE ERECTILE DYSFUNCTION: ICD-10-CM

## 2018-09-26 LAB — PSA SERPL-MCNC: 0.9 NG/ML (ref 0–4)

## 2018-09-26 PROCEDURE — 84153 ASSAY OF PSA TOTAL: CPT

## 2018-09-26 PROCEDURE — 36415 COLL VENOUS BLD VENIPUNCTURE: CPT

## 2018-10-03 PROBLEM — N52.9 IMPOTENCE: Status: ACTIVE | Noted: 2017-10-04

## 2018-10-04 ENCOUNTER — OFFICE VISIT (OUTPATIENT)
Dept: PHYSICAL THERAPY | Facility: REHABILITATION | Age: 68
End: 2018-10-04
Payer: COMMERCIAL

## 2018-10-04 DIAGNOSIS — Z98.890 S/P LATERAL MENISCUS REPAIR OF LEFT KNEE: Primary | ICD-10-CM

## 2018-10-04 DIAGNOSIS — Z98.890 S/P MEDIAL MENISCECTOMY OF LEFT KNEE: ICD-10-CM

## 2018-10-04 DIAGNOSIS — N52.9 MALE ERECTILE DYSFUNCTION: ICD-10-CM

## 2018-10-04 PROCEDURE — 97140 MANUAL THERAPY 1/> REGIONS: CPT | Performed by: PHYSICAL THERAPIST

## 2018-10-04 PROCEDURE — 97110 THERAPEUTIC EXERCISES: CPT | Performed by: PHYSICAL THERAPIST

## 2018-10-04 PROCEDURE — G8979 MOBILITY GOAL STATUS: HCPCS | Performed by: PHYSICAL THERAPIST

## 2018-10-04 PROCEDURE — G8978 MOBILITY CURRENT STATUS: HCPCS | Performed by: PHYSICAL THERAPIST

## 2018-10-04 NOTE — PROGRESS NOTES
Daily Note     Today's date: 10/4/2018  Patient name: Yennifer Butt  : 1950  MRN: [id number]  Referring provider: Devora Park MD  Dx:   Encounter Diagnosis     ICD-10-CM    1  S/P lateral meniscus repair of left knee Z98 890    2  S/P medial meniscectomy of left knee Z98 890        Start Time: 3562  Stop Time: 2772  Total time in clinic (min): 50 minutes    Subjective: I did something on  to really upset my knee  Felt better after doing exercises on Monday  Objective: See treatment diary below      Assessment: Tolerated treatment fair  Patient demonstrated fatigue post treatment, exhibited good technique with therapeutic exercises and would benefit from continued PT      Plan: Continue per plan of care  Progress treatment as tolerated         Precautions: Standard     Daily Treatment Diary     Manual  9/25 10/4           Pat  Mobs   LB           Knee PROM   LB                                      Laser   14 narvaez 6 min                 Exercise Diary  9/25 10/4           Bike   10'           Slant board stretch   3x30"           Hamstring stretch   3x30"           Stool scoots   2 laps            Heel dips   2" 2x10           Wall squats   10x 10"           TKE   BTB  2x30                                                                                                                                                                                        Modalities

## 2018-10-11 ENCOUNTER — OFFICE VISIT (OUTPATIENT)
Dept: PHYSICAL THERAPY | Facility: REHABILITATION | Age: 68
End: 2018-10-11
Payer: COMMERCIAL

## 2018-10-11 DIAGNOSIS — Z98.890 S/P LATERAL MENISCUS REPAIR OF LEFT KNEE: Primary | ICD-10-CM

## 2018-10-11 DIAGNOSIS — Z98.890 S/P MEDIAL MENISCECTOMY OF LEFT KNEE: ICD-10-CM

## 2018-10-11 PROCEDURE — 97110 THERAPEUTIC EXERCISES: CPT | Performed by: PHYSICAL THERAPIST

## 2018-10-11 PROCEDURE — 97140 MANUAL THERAPY 1/> REGIONS: CPT | Performed by: PHYSICAL THERAPIST

## 2018-10-11 PROCEDURE — 97112 NEUROMUSCULAR REEDUCATION: CPT | Performed by: PHYSICAL THERAPIST

## 2018-10-11 NOTE — PROGRESS NOTES
Daily Note     Today's date: 10/11/2018  Patient name: Alyssa Fry  : 1950  MRN: [id number]  Referring provider: Jere Locke MD  Dx:   Encounter Diagnosis     ICD-10-CM    1  S/P lateral meniscus repair of left knee Z98 890    2  S/P medial meniscectomy of left knee Z98 890        Start Time: 0830  Stop Time: 4426  Total time in clinic (min): 57 minutes    Subjective: I am doing better than last week only some minor tightness  Objective: See treatment diary below      Assessment: Tolerated treatment well  Patient demonstrated fatigue post treatment, exhibited good technique with therapeutic exercises and would benefit from continued PT      Plan: Continue per plan of care  Progress treatment as tolerated           Precautions: Standard     Daily Treatment Diary     Manual  9/25 10/4 10/11          Pat  Mobs   LB LB          Knee PROM   LB  LB          ITband stretch    LB                       Laser   14 narvaez 6 min                 Exercise Diary  9/25 10/4 10/11          Bike   10' 10'          Slant board stretch   3x30" 3x30"          Hamstring stretch   3x30"           Stool scoots   2 laps            Heel dips   2" 2x10 2" 10x   4" 10x          Wall squats   10x 10"           TKE   BTB  2x30 2x20  BTB          Steamboats    2x15 GTB           It band wall stretch    2x 1min                                                                                                                                                             Modalities

## 2018-10-18 ENCOUNTER — OFFICE VISIT (OUTPATIENT)
Dept: PHYSICAL THERAPY | Facility: REHABILITATION | Age: 68
End: 2018-10-18
Payer: COMMERCIAL

## 2018-10-18 DIAGNOSIS — Z98.890 S/P LATERAL MENISCUS REPAIR OF LEFT KNEE: Primary | ICD-10-CM

## 2018-10-18 DIAGNOSIS — Z98.890 S/P MEDIAL MENISCECTOMY OF LEFT KNEE: ICD-10-CM

## 2018-10-18 PROCEDURE — 97140 MANUAL THERAPY 1/> REGIONS: CPT | Performed by: PHYSICAL THERAPIST

## 2018-10-18 PROCEDURE — 97110 THERAPEUTIC EXERCISES: CPT | Performed by: PHYSICAL THERAPIST

## 2018-10-18 PROCEDURE — 97112 NEUROMUSCULAR REEDUCATION: CPT | Performed by: PHYSICAL THERAPIST

## 2018-10-18 NOTE — PROGRESS NOTES
Daily Note     Today's date: 10/18/2018  Patient name: Garcia Rees  : 1950  MRN: [id number]  Referring provider: Suni Valverde MD  Dx:   Encounter Diagnosis     ICD-10-CM    1  S/P lateral meniscus repair of left knee Z98 890    2  S/P medial meniscectomy of left knee Z98 890        Start Time: 0830  Stop Time: 1423  Total time in clinic (min): 47 minutes    Subjective: I tried to push it on Tuesday and I definitely aggravated it  Objective: See treatment diary below      Assessment: Tolerated treatment well  Patient demonstrated fatigue post treatment, exhibited good technique with therapeutic exercises and would benefit from continued PT      Plan: Continue per plan of care  Progress treatment as tolerated           Precautions: Standard     Daily Treatment Diary     Manual  9/25 10/4 10/11 10/18         Pat  Mobs   LB LB LB         Knee PROM   LB  LB LB         ITband stretch    LB LB                      Laser   14 narvaez 6 min                 Exercise Diary  9/25 10/4 10/11 10/18         Bike   10' 10' 7'         Slant board stretch   3x30" 3x30" 3x30"         Hamstring stretch   3x30"           Stool scoots   2 laps   2 laps         Heel dips   2" 2x10 2" 10x   4" 10x          Wall squats   10x 10"  standig bosu balance w/ball toss         TKE   BTB  2x30 2x20  BTB          Steamboats    2x15 GTB           It band wall stretch    2x 1min          Monster walk     OTB 4 laps         sidesteps    2 laps                                                                                                                                   Modalities

## 2018-10-25 ENCOUNTER — OFFICE VISIT (OUTPATIENT)
Dept: PHYSICAL THERAPY | Facility: REHABILITATION | Age: 68
End: 2018-10-25
Payer: COMMERCIAL

## 2018-10-25 DIAGNOSIS — Z98.890 S/P MEDIAL MENISCECTOMY OF LEFT KNEE: ICD-10-CM

## 2018-10-25 DIAGNOSIS — Z98.890 S/P LATERAL MENISCUS REPAIR OF LEFT KNEE: Primary | ICD-10-CM

## 2018-10-25 PROCEDURE — 97140 MANUAL THERAPY 1/> REGIONS: CPT | Performed by: PHYSICAL THERAPIST

## 2018-10-25 PROCEDURE — 97110 THERAPEUTIC EXERCISES: CPT | Performed by: PHYSICAL THERAPIST

## 2018-10-25 PROCEDURE — 97112 NEUROMUSCULAR REEDUCATION: CPT | Performed by: PHYSICAL THERAPIST

## 2018-11-05 ENCOUNTER — OFFICE VISIT (OUTPATIENT)
Dept: OBGYN CLINIC | Facility: HOSPITAL | Age: 68
End: 2018-11-05

## 2018-11-05 ENCOUNTER — OFFICE VISIT (OUTPATIENT)
Dept: PHYSICAL THERAPY | Facility: REHABILITATION | Age: 68
End: 2018-11-05
Payer: COMMERCIAL

## 2018-11-05 VITALS
WEIGHT: 173 LBS | DIASTOLIC BLOOD PRESSURE: 86 MMHG | HEART RATE: 76 BPM | SYSTOLIC BLOOD PRESSURE: 133 MMHG | BODY MASS INDEX: 29.53 KG/M2 | HEIGHT: 64 IN

## 2018-11-05 DIAGNOSIS — Z47.89 AFTERCARE FOLLOWING SURGERY OF THE MUSCULOSKELETAL SYSTEM: Primary | ICD-10-CM

## 2018-11-05 DIAGNOSIS — Z98.890 S/P LATERAL MENISCUS REPAIR OF LEFT KNEE: Primary | ICD-10-CM

## 2018-11-05 DIAGNOSIS — Z98.890 S/P MEDIAL MENISCECTOMY OF LEFT KNEE: ICD-10-CM

## 2018-11-05 PROBLEM — S83.272A COMPLEX TEAR OF LATERAL MENISCUS OF LEFT KNEE AS CURRENT INJURY: Status: RESOLVED | Noted: 2018-07-26 | Resolved: 2018-11-05

## 2018-11-05 PROCEDURE — G8978 MOBILITY CURRENT STATUS: HCPCS | Performed by: PHYSICAL THERAPIST

## 2018-11-05 PROCEDURE — 97110 THERAPEUTIC EXERCISES: CPT | Performed by: PHYSICAL THERAPIST

## 2018-11-05 PROCEDURE — 97140 MANUAL THERAPY 1/> REGIONS: CPT | Performed by: PHYSICAL THERAPIST

## 2018-11-05 PROCEDURE — 97112 NEUROMUSCULAR REEDUCATION: CPT | Performed by: PHYSICAL THERAPIST

## 2018-11-05 PROCEDURE — 99024 POSTOP FOLLOW-UP VISIT: CPT | Performed by: PHYSICIAN ASSISTANT

## 2018-11-05 PROCEDURE — G8979 MOBILITY GOAL STATUS: HCPCS | Performed by: PHYSICAL THERAPIST

## 2018-11-05 RX ORDER — POLYETHYLENE GLYCOL 3350, SODIUM CHLORIDE, SODIUM BICARBONATE, POTASSIUM CHLORIDE 420; 11.2; 5.72; 1.48 G/4L; G/4L; G/4L; G/4L
POWDER, FOR SOLUTION ORAL
Refills: 0 | COMMUNITY
Start: 2018-09-27 | End: 2018-12-02 | Stop reason: ALTCHOICE

## 2018-11-05 NOTE — PROGRESS NOTES
Surgery: 8/24/18 STEVEN w/ JASON    S: Veena Cheney is making good progress  He has not fully resumed activities to his tolerance  He states he was not sure whether or not he could go back to working out  He is eager to try the [facility] that he has at home  He continues to work with his therapist to strengthening the left knee  He is pleased with his progress  He denies new injury or trauma  O:   Full Range of motion  No effusion   No joint line tenderness  Healed arthroscopic portals    A/P: 2 5 months s/p partial lateral meniscectomy  1  Increase activities to tolerance  2  Resume normal gym and exercise activities  3   Follow up as needed

## 2018-11-05 NOTE — PROGRESS NOTES
Daily Note     Today's date: 2018  Patient name: Ty Plunkett  : 1950  MRN: [id number]  Referring provider: Lazarus Hake, MD  Dx:   Encounter Diagnosis     ICD-10-CM    1  S/P lateral meniscus repair of left knee Z98 890    2  S/P medial meniscectomy of left knee Z98 890                   Subjective: I was able to hike in [address] [address] still afraid to go back to regular exercise  Objective: See treatment diary below      Assessment: Tolerated treatment well  Patient demonstrated fatigue post treatment, exhibited good technique with therapeutic exercises and would benefit from continued PT      Plan: Continue per plan of care  Progress treatment as tolerated         Precautions: Standard     Daily Treatment Diary     Manual  9/25 10/4 10/11 10/18 10/25 11/05       Pat  Mobs   LB LB LB SK LB       Knee PROM   LB  LB LB SK LB       ITband stretch    LB LB SK LB                    Laser   14 narvaez 6 min                 Exercise Diary  9/25 10/4 10/11 10/18 10/25 11/05       Bike   10' 10' 7' 10' 8'       Slant board stretch   3x30" 3x30" 3x30" 3x30" 3x30"       Hamstring stretch   3x30"    3x30"       Stool scoots   2 laps   2 laps 2 laps        Heel dips   2" 2x10 2" 10x   4" 10x   6" 3x5       Wall squats   10x 10"  standig bosu balance w/ball toss standing bosu balance w/ ball toss 10x 30"   Wall squats marches        TKE   BTB  2x30 2x20  BTB          Steamboats    2x15 GTB           It band wall stretch    2x 1min   3x 1 min       Monster walk     OTB 4 laps OTB  4 laps        sidesteps    2 laps          Lunges       2 laps        karaoke      2 laps        Ball toss w/squat on bosu       5 min  2x breaks  30" ea                                                                                         Modalities

## 2018-11-14 ENCOUNTER — OFFICE VISIT (OUTPATIENT)
Dept: PHYSICAL THERAPY | Facility: REHABILITATION | Age: 68
End: 2018-11-14
Payer: COMMERCIAL

## 2018-11-14 DIAGNOSIS — Z98.890 S/P LATERAL MENISCUS REPAIR OF LEFT KNEE: Primary | ICD-10-CM

## 2018-11-14 DIAGNOSIS — Z98.890 S/P MEDIAL MENISCECTOMY OF LEFT KNEE: ICD-10-CM

## 2018-11-14 PROCEDURE — 97110 THERAPEUTIC EXERCISES: CPT | Performed by: PHYSICAL THERAPIST

## 2018-11-14 PROCEDURE — 97140 MANUAL THERAPY 1/> REGIONS: CPT | Performed by: PHYSICAL THERAPIST

## 2018-11-14 NOTE — PROGRESS NOTES
Daily Note     Today's date: 2018  Patient name: Garcia Remy  : 1950  MRN: [id number]  Referring provider: Nneka Becerra MD  Dx:   Encounter Diagnosis     ICD-10-CM    1  S/P lateral meniscus repair of left knee Z98 890    2  S/P medial meniscectomy of left knee Z98 890        Start Time: 0730  Stop Time: 0816  Total time in clinic (min): 46 minutes    Subjective: I am doing well just have some pain on the front of my knee  Not sure why I am getting pain when I didn't have it there until recently  Objective: See treatment diary below      Assessment: Tolerated treatment well  Focus on  Quad pain and tightness  Utilized laser and ice massage to decrease early signs of patellar tendonitis  Patient would benefit from continued PT      Plan: Continue per plan of care  Progress treatment as tolerated           Precautions: Standard     Daily Treatment Diary     Manual  9/25 10/4 10/11 10/18 10/25 11/05 11/14      Pat  Mobs   LB LB LB SK LB LB      Knee PROM   LB  LB LB SK LB LB      ITband stretch    LB LB SK LB LB      Ice massage        4 5 min       Laser   14 narvaez 6 min       13 narvaez 5 min           Exercise Diary  9/25 10/4 10/11 10/18 10/25 11/05 11/14      Bike   10' 10' 7' 10' 8' 5' curve 8'      Slant board stretch   3x30" 3x30" 3x30" 3x30" 3x30" 3x30"      Hamstring stretch   3x30"    3x30" 3x30"      Stool scoots   2 laps   2 laps 2 laps        Heel dips   2" 2x10 2" 10x   4" 10x   6" 3x5       Wall squats   10x 10"  standig bosu balance w/ball toss standing bosu balance w/ ball toss 10x 30"   Wall squats marches        TKE   BTB  2x30 2x20  BTB          Steamboats    2x15 GTB           It band wall stretch    2x 1min   3x 1 min 3x1 min       Monster walk     OTB 4 laps OTB  4 laps        sidesteps    2 laps          Lunges       2 laps        karaoke      2 laps        Ball toss w/squat on bosu       5 min  2x breaks  30" ea       Hip flexor stretch        2x1 I min Prone quad /hip flexor stretch        3x30"                                                              Modalities

## 2018-11-21 ENCOUNTER — OFFICE VISIT (OUTPATIENT)
Dept: PHYSICAL THERAPY | Facility: REHABILITATION | Age: 68
End: 2018-11-21
Payer: COMMERCIAL

## 2018-11-21 DIAGNOSIS — Z98.890 S/P LATERAL MENISCUS REPAIR OF LEFT KNEE: Primary | ICD-10-CM

## 2018-11-21 DIAGNOSIS — Z98.890 S/P MEDIAL MENISCECTOMY OF LEFT KNEE: ICD-10-CM

## 2018-11-21 PROCEDURE — G8980 MOBILITY D/C STATUS: HCPCS | Performed by: PHYSICAL THERAPIST

## 2018-11-21 PROCEDURE — 97140 MANUAL THERAPY 1/> REGIONS: CPT | Performed by: PHYSICAL THERAPIST

## 2018-11-21 PROCEDURE — G8979 MOBILITY GOAL STATUS: HCPCS | Performed by: PHYSICAL THERAPIST

## 2018-11-21 PROCEDURE — 97110 THERAPEUTIC EXERCISES: CPT | Performed by: PHYSICAL THERAPIST

## 2018-11-21 NOTE — PROGRESS NOTES
PT Discharge    Today's date: 2018  Patient name: Anil Rico  : 1950  MRN: [id number]  Referring provider: Bing Weller MD  Dx:   Encounter Diagnosis     ICD-10-CM    1  S/P lateral meniscus repair of left knee Z98 890    2  S/P medial meniscectomy of left knee Z98 890        Start Time: 730  Stop Time: 820  Total time in clinic (min): 50 minutes    Assessment  Assessment details: Pt has been attending outpatient PT for 8____  Pt has made steady progress in PT and has met all impairment and functional goals at this time  Pt is independent with HEP  Pt is D/C from PT to HEP continue to progress towards personal goals  Thank you! Goals  Short Term: all achieved   Pt will report decreased levels of pain by at least 2 subjective ratings in 4 weeks  Pt will demonstrate improved ROM by at least 5 degrees in 4 weeks  Long Term: All achieved   Pt will be independent in their HEP in 8 weeks  Pt will demonstrate improved FOTO, > 67  Pt will return to performing all transfers with 0/10 pain  Pt will perform all ADL's without pain or limitation  Pt will return to prior recreational activities without limitation           Subjective Evaluation    Pain  Current pain ratin  At best pain ratin  At worst pain ratin          Objective     Active Range of Motion   Left Knee   Normal active range of motion    Right Knee   Normal active range of motion    Strength/Myotome Testing     Left Knee   Normal strength    Right Knee   Normal strength    Ambulation     Ambulation: Stairs   Ascend stairs: independent  Railings: without rails  Descend stairs: independent  Railings: without rails    Functional Assessment     Single Leg Stance - Eyes Open   Left  Trial 1: 21 seconds  Trial 2: 22 seconds  Trial 3: 27 seconds  Average: 23 33 seconds         Precautions: Standard     Daily Treatment Diary     Manual  9/25 10/4 10/11 10/18 10/25 11/05 11/14 11/21     Pat  Mobs   LB LB LB SK LB LB LB Knee PROM   LB  LB LB SK LB LB LB     ITband stretch    LB LB SK LB LB LB     Ice massage        4 5 min       Laser   14 narvaez 6 min       13 narvaez 5 min           Exercise Diary  9/25 10/4 10/11 10/18 10/25 11/05 11/14 11/21     Bike   10' 10' 7' 10' 8' 5' curve 8' 5 curve 5     Slant board stretch   3x30" 3x30" 3x30" 3x30" 3x30" 3x30" 3x30     Hamstring stretch   3x30"    3x30" 3x30" 3x30"     Stool scoots   2 laps   2 laps 2 laps        Heel dips   2" 2x10 2" 10x   4" 10x   6" 3x5  8"2x8     Wall squats   10x 10"  standig bosu balance w/ball toss standing bosu balance w/ ball toss 10x 30"   Wall squats marches        TKE   BTB  2x30 2x20  BTB          Steamboats    2x15 GTB           It band wall stretch    2x 1min   3x 1 min 3x1 min  3x1 min     Monster walk     OTB 4 laps OTB  4 laps        sidesteps    2 laps          Lunges       2 laps   2 laps w TR     karaoke      2 laps        Ball toss w/squat on bosu       5 min  2x breaks  30" ea       Hip flexor stretch        2x1 I min 2x1 min     Prone quad /hip flexor stretch        3x30"                                                              Modalities                                                                 Flowsheet Rows      Most Recent Value   PT/OT G-Codes   Current Score  89   Projected Score  67   FOTO information reviewed  Yes   Assessment Type  Discharge   G code set  Mobility: Walking & Moving Around   Mobility: Walking and Moving Around Goal Status ()  CJ   Mobility: Walking and Moving Around Discharge Status ()  CJ

## 2018-11-28 ENCOUNTER — APPOINTMENT (OUTPATIENT)
Dept: PHYSICAL THERAPY | Facility: REHABILITATION | Age: 68
End: 2018-11-28
Payer: COMMERCIAL

## 2018-12-02 ENCOUNTER — HOSPITAL ENCOUNTER (EMERGENCY)
Facility: HOSPITAL | Age: 68
Discharge: HOME | End: 2018-12-02
Attending: EMERGENCY MEDICINE | Admitting: EMERGENCY MEDICINE
Payer: COMMERCIAL

## 2018-12-02 VITALS
TEMPERATURE: 98.1 F | OXYGEN SATURATION: 97 % | SYSTOLIC BLOOD PRESSURE: 134 MMHG | HEART RATE: 85 BPM | BODY MASS INDEX: 30.05 KG/M2 | WEIGHT: 175.04 LBS | DIASTOLIC BLOOD PRESSURE: 80 MMHG | RESPIRATION RATE: 18 BRPM

## 2018-12-02 DIAGNOSIS — M62.838 NECK MUSCLE SPASM: Primary | ICD-10-CM

## 2018-12-02 PROCEDURE — 99283 EMERGENCY DEPT VISIT LOW MDM: CPT

## 2018-12-02 RX ORDER — NAPROXEN 375 MG/1
375 TABLET ORAL 2 TIMES DAILY WITH MEALS
Qty: 20 TABLET | Refills: 0 | Status: SHIPPED | OUTPATIENT
Start: 2018-12-02 | End: 2019-11-27 | Stop reason: ALTCHOICE

## 2018-12-02 RX ORDER — CYCLOBENZAPRINE HCL 10 MG
10 TABLET ORAL 2 TIMES DAILY PRN
Qty: 20 TABLET | Refills: 0 | Status: SHIPPED | OUTPATIENT
Start: 2018-12-02 | End: 2019-11-27 | Stop reason: ALTCHOICE

## 2018-12-02 RX ORDER — CYCLOBENZAPRINE HCL 10 MG
10 TABLET ORAL ONCE
Status: COMPLETED | OUTPATIENT
Start: 2018-12-02 | End: 2018-12-02

## 2018-12-02 RX ADMIN — CYCLOBENZAPRINE HYDROCHLORIDE 10 MG: 10 TABLET, FILM COATED ORAL at 04:55

## 2018-12-02 NOTE — DISCHARGE INSTRUCTIONS
Muscle Spasm   WHAT YOU NEED TO KNOW:   A muscle spasm is a sudden contraction of any muscle or group of muscles  A muscle cramp is a painful muscle spasm  Muscle cramps commonly occur after intense exercise or during pregnancy  They may also be caused by certain medications, dehydration, low calcium or magnesium levels, or another medical condition  DISCHARGE INSTRUCTIONS:   Medicines: You may need the following:  · NSAIDs  help decrease swelling and pain or fever  This medicine is available with or without a doctor's order  NSAIDs can cause stomach bleeding or kidney problems in certain people  If you take blood thinner medicine, always ask your healthcare provider if NSAIDs are safe for you  Always read the medicine label and follow directions  · Take your medicine as directed  Contact your healthcare provider if you think your medicine is not helping or if you have side effects  Tell him of her if you are allergic to any medicine  Keep a list of the medicines, vitamins, and herbs you take  Include the amounts, and when and why you take them  Bring the list or the pill bottles to follow-up visits  Carry your medicine list with you in case of an emergency  Follow up with your healthcare provider as directed: You may need other tests or treatment  You may also be referred to a physical therapist or other specialist  Write down your questions so you remember to ask them during your visits  Self-care:   · Stretch  your muscle to help relieve the cramp  It may be helpful to keep your muscle in the stretched position until the cramp is gone  · Apply heat  to help decrease pain and muscle spasms  Apply heat on the area for 20 to 30 minutes every 2 hours for as many days as directed  · Apply ice  to help decrease swelling and pain  Ice may also help prevent tissue damage  Use an ice pack, or put crushed ice in a plastic bag   Cover it with a towel and place it on your muscle for 15 to 20 minutes every hour or as directed  · Drink more liquids  to help prevent muscle cramps caused by dehydration  Sports drinks may help replace electrolytes you lose through sweat during exercise  Ask your healthcare provider how much liquid to drink each day and which liquids are best for you  · Eat healthy foods , such as fruits, vegetables, whole grains, low-fat dairy products, and lean proteins (meat, beans, and fish)  If you are pregnant, ask your healthcare provider about foods that are high in magnesium and sodium  They may help to relieve cramps during pregnancy  · Massage your muscle  to help relieve the cramp  · Take frequent deep breaths  until the cramp feels better  Lie down while you take the deep breaths so you do not get dizzy or lightheaded  Contact your healthcare provider if:   · You have signs of dehydration, such as a headache, dark yellow urine, dry eyes or mouth, or a fast heartbeat  · You have questions or concerns about your condition or care  Return to the emergency department if:   · You have warmth, swelling, or redness in the cramping muscle  · You have frequent or unrelieved muscle cramps in several different muscles  · You have muscle cramps with numbness, tingling, and burning in your hands and feet  © 2017 [address] Information is for End User's use only and may not be sold, redistributed or otherwise used for commercial purposes  All illustrations and images included in CareNotes® are the copyrighted property of [employer]  or Chai Baker  The above information is an  only  It is not intended as medical advice for individual conditions or treatments  Talk to your doctor, nurse or pharmacist before following any medical regimen to see if it is safe and effective for you

## 2018-12-02 NOTE — ED PROVIDER NOTES
History  Chief Complaint   Patient presents with    Neck Pain     Pt c/o 2-3/10 pain to posterior neck starting yesterday  Started as stiffness 10/10 pain and comes in waves  Took aleve at midnight tonight  Denies fevers, nausea vomiting or trauma  The patient is a 71-year-old male, seen with his wife, who assisted with providing both acute and chronic medical history  The patient states he was in his normal state of health up until yesterday, when he began have pain in his right posterior neck  The patient states that he was working out quite vigorously, and subsequently, developed pain in his right posterior neck, that radiated to the posterior portion of his head  The patient states this pain was not associated with trauma, any continues to be able to move his neck in all directions, but states that he does have some discomfort with looking completely superiorly, as well as turning his head completely to the right  The patient states that, with this, he has had no numbness, tingling, loss of motor function, or paresthesias  In addition, the patient states this happened multiple times in the past, particularly when he overuses and over exercises his trapezius  The patient states that, in the past, he has utilized muscle relaxers, which have been largely successful in aborting the symptoms  In attempt to palliate symptoms at home, the patient has taken several doses of Tylenol and Motrin, which has been largely unsuccessful  Prior to Admission Medications   Prescriptions Last Dose Informant Patient Reported? Taking?    Multiple Vitamin tablet  Self Yes No   Sig: Take 1 tablet by mouth daily   acetaminophen (TYLENOL) 500 mg tablet  Self Yes No   Sig: Take 500 mg by mouth every 6 (six) hours as needed for mild pain   amLODIPine (NORVASC) 5 mg tablet  Self Yes No   Sig: Take 5 mg by mouth daily     aspirin 325 mg tablet  Self Yes No   Sig: Take 1 tablet by mouth daily   cetirizine (ZYRTEC ALLERGY) 10 mg tablet  Self Yes No   Sig: Take 1 tablet by mouth daily   levothyroxine 175 mcg tablet  Self Yes No   Sig: Take 1 tablet by mouth daily   metoprolol succinate (TOPROL-XL) 25 mg 24 hr tablet  Self Yes No   Sig: Take 1 tablet by mouth daily   metoprolol tartrate (LOPRESSOR) 25 mg tablet   Yes No   Sig: Take 25 mg by mouth 2 (two) times a day   pantoprazole (PROTONIX) 40 mg tablet  Self Yes No   Sig: Take 1 tablet by mouth 2 (two) times a day     simvastatin (ZOCOR) 40 mg tablet  Self Yes No   Sig: Take 1 tablet by mouth daily      Facility-Administered Medications: None       Past Medical History:   Diagnosis Date    Disease of thyroid gland     GERD (gastroesophageal reflux disease)     History of chemotherapy     Hyperlipidemia     Hypertension     Lymphoma (HCC)        Past Surgical History:   Procedure Laterality Date    CORONARY ANGIOPLASTY      4 stents    FL KNEE SCOPE,MED/LAT MENISECTOMY Left 8/24/2018    Procedure: ARTHROSCOPIC PARTIAL  LATERAL MENISCECTOMY;  Surgeon: Arnav Gould MD;  Location: [facility];  Service: Orthopedics       Family History   Problem Relation Age of Onset    No Known Problems Mother     No Known Problems Father      I have reviewed and agree with the history as documented  Social History   Substance Use Topics    Smoking status: Former Smoker    Smokeless tobacco: Never Used    Alcohol use No        Review of Systems  Review of Systems: The Patient/Parent Denies the following: Negative, Except as noted in HPI  Physical Exam  Physical Exam  General: 80 y o  male patient, who appears their stated age, in mild distress  Skin: No rashes, masses, or lesions noted  HEENT: Atraumatic & Normocephalic  External ears normal, with no noted abnormalities or deformities  Bilateral canals examined, without noted edema or discomfort  No pain while pulling the tragus   TM well visualized bilaterally, with no noted obstruction, effusion, erythema, or air fluid levels  No noted enlargement of the mastoid processes bilaterally  EOMI, PERRL, Conjunctiva without injection bilaterally  No conjunctival drainage noted bilaterally  Nares patent bilaterally, with no noted obstructions, erythema, or drainage  No noted rhinorrhea  Pharynx well visualized, with no exudate noted in the posterior pharynx  Tonsils are not enlarged  Gingival surfaces are within normal limits  Neck: Soft, supple, and non-tender  No enlargement of the anterior cervical, posterior cervical, or occipital lymph notes  Cardiac: Regular rate and rhythm, with no noted murmurs, rubs, or gallops  Pulmonary: Normal Appearance  Clear to auscultation, with no noted rales, rhonchi, or wheezes  Abdomen: Normal appearance  Dull to palpation, except over the gastric bubble, which was mildly tympanic  Bowel sounds were within normal limits, with no noted high pitch sounds heard  Negative Hunter sign  No pain with palpation at [facility]  MSK: Neck: Positive paraspinal muscle tenderness noted to the right lateral cervical neck, overlying the right paraspinal muscles, No tenderness noted to palpation of the spinous processes, Negative pain with lateral bending, No point tenderness noted, No noted edema, No noted ecchymosis, No noted increased joint laxity, Normal Active ROM, Normal Passive ROM, No gross deformities noted, Normal Muscle Strength, Spine: No paraspinal muscle tenderness noted, No tenderness noted to palpation of the spinous processes, Negative pain with lateral bending, No point tenderness noted, No noted edema, No noted ecchymosis, No noted increased joint laxity, Normal Active ROM, Normal Passive ROM, No gross deformities noted, Normal Muscle Strength   All other Joints grossly normal           Vital Signs  ED Triage Vitals [12/02/18 0427]   Temperature Pulse Respirations Blood Pressure SpO2   98 1 °F (36 7 °C) 85 18 134/80 97 %      Temp Source Heart Rate Source Patient Position - Orthostatic VS BP Location FiO2 (%)   Oral Monitor Sitting Right arm --      Pain Score       2           Vitals:    12/02/18 0427   BP: 134/80   Pulse: 85   Patient Position - Orthostatic VS: Sitting       Visual Acuity      ED Medications  Medications   cyclobenzaprine (FLEXERIL) tablet 10 mg (10 mg Oral Given 12/2/18 3727)       Diagnostic Studies  Results Reviewed     None                 No orders to display              Procedures  Procedures       Phone Contacts  ED Phone Contact    ED Course                               MDM  Number of Diagnoses or Management Options  Neck muscle spasm: new and requires workup  Diagnosis management comments: Medical Decision Making:  Most likely diagnosis is a muscular/soft tissue strain of the right cervical paraspinal muscles  Primary considerations in diagnosis include patient onset of symptoms, physical exam, as well as lack of radiographic evidence demonstrating acute osseous abnormality  This strain appears uncomplicated, as there is no noted interruption of the skin, normal vascular status distal to the site of injury, normal sensation distal to the site of injury, and no noted evidence underlying infection, or overlying cellulitis  In addition, palpation of the overlying compartments of the site of injury revealed no increased pressure, likely indicating no compartment syndrome  The patient was instructed to follow up with sports medicine or their primary care provider for further evaluation and treatment of this condition  Neck Pain - DDx including but not limited to: strain, sprain, arthritis, spinal stenosis, torticollis, herniated disc, radiculopathy; doubt epidural abscess or fracture or meningitis or carotid dissection  Patient was instructed to return to the emergency department or seek further evaluation if they develop numbness, paresthesia, or notably increased pain at the site of injury, or distal to the site of injury             Amount and/or Complexity of Data Reviewed  Clinical lab tests: reviewed  Tests in the radiology section of CPT®: reviewed  Tests in the medicine section of CPT®: reviewed  Decide to obtain previous medical records or to obtain history from someone other than the patient: yes  Obtain history from someone other than the patient: yes  Review and summarize past medical records: yes  Discuss the patient with other providers: yes  Independent visualization of images, tracings, or specimens: yes    Risk of Complications, Morbidity, and/or Mortality  Presenting problems: moderate  Diagnostic procedures: moderate  Management options: moderate    Patient Progress  Patient progress: improved    CritCare Time    Disposition  Final diagnoses:   Neck muscle spasm     Time reflects when diagnosis was documented in both MDM as applicable and the Disposition within this note     Time User Action Codes Description Comment    12/2/2018  4:45 AM Christal Garcia [[id number]] Neck muscle spasm       ED Disposition     ED Disposition Condition Comment    Discharge  Nevaeh Chatman discharge to home/self care      Condition at discharge: Good        Follow-up Information     Follow up With Specialties Details Why Contact Info    Richmond Mendoza MD Internal Medicine In 3 days If symptoms worsen, As needed [address]            Discharge Medication List as of 12/2/2018  4:46 AM      START taking these medications    Details   cyclobenzaprine (FLEXERIL) 10 mg tablet Take 1 tablet (10 mg total) by mouth 2 (two) times a day as needed for muscle spasms, Starting Sun 12/2/2018, Print      naproxen (NAPROSYN) 375 mg tablet Take 1 tablet (375 mg total) by mouth 2 (two) times a day with meals, Starting Sun 12/2/2018, Print         CONTINUE these medications which have NOT CHANGED    Details   acetaminophen (TYLENOL) 500 mg tablet Take 500 mg by mouth every 6 (six) hours as needed for mild pain, Historical Med      amLODIPine (NORVASC) 5 mg tablet Take 5 mg by mouth daily  , Starting Tue 9/18/2018, Historical Med      aspirin 325 mg tablet Take 1 tablet by mouth daily, Starting Mon 10/22/2012, Historical Med      cetirizine (ZYRTEC ALLERGY) 10 mg tablet Take 1 tablet by mouth daily, Starting Mon 10/22/2012, Historical Med      levothyroxine 175 mcg tablet Take 1 tablet by mouth daily, Starting Mon 3/19/2012, Historical Med      metoprolol succinate (TOPROL-XL) 25 mg 24 hr tablet Take 1 tablet by mouth daily, Starting Mon 10/22/2012, Historical Med      metoprolol tartrate (LOPRESSOR) 25 mg tablet Take 25 mg by mouth 2 (two) times a day, Starting Fri 9/21/2018, Historical Med      Multiple Vitamin tablet Take 1 tablet by mouth daily, Historical Med      pantoprazole (PROTONIX) 40 mg tablet Take 1 tablet by mouth 2 (two) times a day  , Starting Mon 10/22/2012, Historical Med      simvastatin (ZOCOR) 40 mg tablet Take 1 tablet by mouth daily, Starting Mon 10/22/2012, Historical Med           No discharge procedures on file      ED Provider  Electronically Signed by           Kathlynn Cheadle, PA-C  12/02/18 8816

## 2019-01-04 ENCOUNTER — HOSPITAL ENCOUNTER (OUTPATIENT)
Dept: RADIOLOGY | Facility: HOSPITAL | Age: 69
Discharge: HOME | End: 2019-01-04
Payer: COMMERCIAL

## 2019-01-04 ENCOUNTER — TRANSCRIBE ORDERS (OUTPATIENT)
Dept: ADMINISTRATIVE | Facility: HOSPITAL | Age: 69
End: 2019-01-04

## 2019-01-04 DIAGNOSIS — J18.9 PNEUMONIA DUE TO INFECTIOUS ORGANISM, UNSPECIFIED LATERALITY, UNSPECIFIED PART OF LUNG: ICD-10-CM

## 2019-01-04 DIAGNOSIS — J18.9 PNEUMONIA DUE TO INFECTIOUS ORGANISM, UNSPECIFIED LATERALITY, UNSPECIFIED PART OF LUNG: Primary | ICD-10-CM

## 2019-01-04 PROCEDURE — 71046 X-RAY EXAM CHEST 2 VIEWS: CPT

## 2019-04-18 ENCOUNTER — TRANSCRIBE ORDERS (OUTPATIENT)
Dept: LAB | Facility: CLINIC | Age: 69
End: 2019-04-18

## 2019-04-18 ENCOUNTER — APPOINTMENT (OUTPATIENT)
Dept: LAB | Facility: CLINIC | Age: 69
End: 2019-04-18
Payer: COMMERCIAL

## 2019-04-18 DIAGNOSIS — E55.9 VITAMIN D DEFICIENCY: ICD-10-CM

## 2019-04-18 DIAGNOSIS — I25.10 ATHEROSCLEROSIS OF NATIVE CORONARY ARTERY, ANGINA PRESENCE UNSPECIFIED, UNSPECIFIED WHETHER NATIVE OR TRANSPLANTED HEART: ICD-10-CM

## 2019-04-18 DIAGNOSIS — E78.5 HYPERLIPIDEMIA, UNSPECIFIED HYPERLIPIDEMIA TYPE: ICD-10-CM

## 2019-04-18 DIAGNOSIS — E78.5 HYPERLIPIDEMIA, UNSPECIFIED HYPERLIPIDEMIA TYPE: Primary | ICD-10-CM

## 2019-04-18 LAB
25(OH)D3 SERPL-MCNC: 51.1 NG/ML (ref 30–100)
ALBUMIN SERPL BCP-MCNC: 4.2 G/DL (ref 3.5–5)
ALP SERPL-CCNC: 68 U/L (ref 46–116)
ALT SERPL W P-5'-P-CCNC: 33 U/L (ref 12–78)
ANION GAP SERPL CALCULATED.3IONS-SCNC: 4 MMOL/L (ref 4–13)
AST SERPL W P-5'-P-CCNC: 22 U/L (ref 5–45)
BILIRUB SERPL-MCNC: 0.8 MG/DL (ref 0.2–1)
BUN SERPL-MCNC: 21 MG/DL (ref 5–25)
CALCIUM SERPL-MCNC: 8.6 MG/DL (ref 8.3–10.1)
CHLORIDE SERPL-SCNC: 108 MMOL/L (ref 100–108)
CHOLEST SERPL-MCNC: 120 MG/DL (ref 50–200)
CO2 SERPL-SCNC: 29 MMOL/L (ref 21–32)
CREAT SERPL-MCNC: 0.85 MG/DL (ref 0.6–1.3)
ERYTHROCYTE [DISTWIDTH] IN BLOOD BY AUTOMATED COUNT: 13.4 % (ref 11.6–15.1)
GFR SERPL CREATININE-BSD FRML MDRD: 90 ML/MIN/1.73SQ M
GLUCOSE P FAST SERPL-MCNC: 100 MG/DL (ref 65–99)
HCT VFR BLD AUTO: 44.9 % (ref 36.5–49.3)
HDLC SERPL-MCNC: 34 MG/DL (ref 40–60)
HGB BLD-MCNC: 15.7 G/DL (ref 12–17)
LDLC SERPL CALC-MCNC: 59 MG/DL (ref 0–100)
MCH RBC QN AUTO: 32 PG (ref 26.8–34.3)
MCHC RBC AUTO-ENTMCNC: 35 G/DL (ref 31.4–37.4)
MCV RBC AUTO: 92 FL (ref 82–98)
NONHDLC SERPL-MCNC: 86 MG/DL
PLATELET # BLD AUTO: 177 THOUSANDS/UL (ref 149–390)
PMV BLD AUTO: 10.2 FL (ref 8.9–12.7)
POTASSIUM SERPL-SCNC: 4.2 MMOL/L (ref 3.5–5.3)
PROT SERPL-MCNC: 6.9 G/DL (ref 6.4–8.2)
RBC # BLD AUTO: 4.9 MILLION/UL (ref 3.88–5.62)
SODIUM SERPL-SCNC: 141 MMOL/L (ref 136–145)
TRIGL SERPL-MCNC: 133 MG/DL
WBC # BLD AUTO: 5.36 THOUSAND/UL (ref 4.31–10.16)

## 2019-04-18 PROCEDURE — 80061 LIPID PANEL: CPT

## 2019-04-18 PROCEDURE — 82306 VITAMIN D 25 HYDROXY: CPT

## 2019-04-18 PROCEDURE — 80053 COMPREHEN METABOLIC PANEL: CPT

## 2019-04-18 PROCEDURE — 36415 COLL VENOUS BLD VENIPUNCTURE: CPT

## 2019-04-18 PROCEDURE — 85027 COMPLETE CBC AUTOMATED: CPT

## 2019-09-19 ENCOUNTER — TRANSCRIBE ORDERS (OUTPATIENT)
Dept: ADMINISTRATIVE | Facility: HOSPITAL | Age: 69
End: 2019-09-19

## 2019-09-19 DIAGNOSIS — I25.10 CORONARY ARTERY DISEASE, ANGINA PRESENCE UNSPECIFIED, UNSPECIFIED VESSEL OR LESION TYPE, UNSPECIFIED WHETHER NATIVE OR TRANSPLANTED HEART: Primary | ICD-10-CM

## 2019-10-02 ENCOUNTER — APPOINTMENT (OUTPATIENT)
Dept: LAB | Facility: CLINIC | Age: 69
End: 2019-10-02
Payer: COMMERCIAL

## 2019-10-02 DIAGNOSIS — N52.9 IMPOTENCE: ICD-10-CM

## 2019-10-02 LAB — PSA SERPL-MCNC: 1 NG/ML (ref 0–4)

## 2019-10-02 PROCEDURE — 84153 ASSAY OF PSA TOTAL: CPT

## 2019-10-03 ENCOUNTER — HOSPITAL ENCOUNTER (OUTPATIENT)
Dept: NON INVASIVE DIAGNOSTICS | Facility: CLINIC | Age: 69
Discharge: HOME | End: 2019-10-03
Payer: COMMERCIAL

## 2019-10-03 DIAGNOSIS — I25.10 CORONARY ARTERY DISEASE, ANGINA PRESENCE UNSPECIFIED, UNSPECIFIED VESSEL OR LESION TYPE, UNSPECIFIED WHETHER NATIVE OR TRANSPLANTED HEART: ICD-10-CM

## 2019-10-03 LAB
ARRHY DURING EX: NORMAL
CHEST PAIN STATEMENT: NORMAL
MAX DIASTOLIC BP: 80 MMHG
MAX HEART RATE: 157 BPM
MAX PREDICTED HEART RATE: 152 BPM
MAX. SYSTOLIC BP: 166 MMHG
PROTOCOL NAME: NORMAL
REASON FOR TERMINATION: NORMAL
TARGET HR FORMULA: NORMAL
TEST INDICATION: NORMAL
TIME IN EXERCISE PHASE: NORMAL

## 2019-10-03 PROCEDURE — 93017 CV STRESS TEST TRACING ONLY: CPT

## 2019-10-03 PROCEDURE — 78452 HT MUSCLE IMAGE SPECT MULT: CPT

## 2019-10-03 PROCEDURE — 93018 CV STRESS TEST I&R ONLY: CPT | Performed by: INTERNAL MEDICINE

## 2019-10-03 PROCEDURE — 78452 HT MUSCLE IMAGE SPECT MULT: CPT | Performed by: INTERNAL MEDICINE

## 2019-10-03 PROCEDURE — 93016 CV STRESS TEST SUPVJ ONLY: CPT | Performed by: INTERNAL MEDICINE

## 2019-10-03 PROCEDURE — A9502 TC99M TETROFOSMIN: HCPCS

## 2019-10-06 NOTE — PROGRESS NOTES
10/8/2019    Kay Rosario  1950  [id number]    Discussion and Plan    Patient continues to do well with no significant urinary complaints  Impotence has resolved at this juncture and he no longer requires a medications  Patient will return in 1 year with PSA prior to visit  All questions answered at this time  1  Impotence  - PSA Total, Diagnostic; Future    Assessment      Patient Active Problem List   Diagnosis    Aftercare following surgery of the musculoskeletal system    Impotence       History of Present Illness    Reinaldo Askew is a 77 y o  male seen today in regards to a history of ED and nephrolithiasis returns in follow up  He denies any interval flank pain or hematuria  Reports nocturia x2-4 which is diet related  No interested in ED treatment at this time  PSA is stable at 1 0  Adequate response to Cialis which he has not required since the time of his last visit      Urinary Symptom Assessment        Past Medical History  Past Medical History:   Diagnosis Date    Disease of thyroid gland     GERD (gastroesophageal reflux disease)     History of chemotherapy     Hyperlipidemia     Hypertension     Lymphoma (HCC)        Past Social History  Past Surgical History:   Procedure Laterality Date    CORONARY ANGIOPLASTY      4 stents    CT KNEE SCOPE,MED/LAT MENISECTOMY Left 8/24/2018    Procedure: ARTHROSCOPIC PARTIAL  LATERAL MENISCECTOMY;  Surgeon: Mary Shrestha MD;  Location: [facility];  Service: Orthopedics       Past Family History  Family History   Problem Relation Age of Onset    No Known Problems Mother     No Known Problems Father        Past Social history  Social History     Socioeconomic History    Marital status: Single     Spouse name: Not on file    Number of children: Not on file    Years of education: Not on file    Highest education level: Not on file   Occupational History    Not on file   Social Needs    Financial resource strain: Not on file   Milind-Simone insecurity:     Worry: Not on file     Inability: Not on file    Transportation needs:     Medical: Not on file     Non-medical: Not on file   Tobacco Use    Smoking status: Former Smoker    Smokeless tobacco: Never Used   Substance and Sexual Activity    Alcohol use: No    Drug use: No    Sexual activity: Not on file   Lifestyle    Physical activity:     Days per week: Not on file     Minutes per session: Not on file    Stress: Not on file   Relationships    Social connections:     Talks on phone: Not on file     Gets together: Not on file     Attends Orthodoxy service: Not on file     Active member of club or organization: Not on file     Attends meetings of clubs or organizations: Not on file     Relationship status: Not on file    Intimate partner violence:     Fear of current or ex partner: Not on file     Emotionally abused: Not on file     Physically abused: Not on file     Forced sexual activity: Not on file   Other Topics Concern    Not on file   Social History Narrative    Not on file       Current Medications  Current Outpatient Medications   Medication Sig Dispense Refill    acetaminophen (TYLENOL) 500 mg tablet Take 500 mg by mouth every 6 (six) hours as needed for mild pain      amLODIPine (NORVASC) 5 mg tablet Take 5 mg by mouth daily        aspirin 325 mg tablet Take 1 tablet by mouth daily      cetirizine (ZYRTEC ALLERGY) 10 mg tablet Take 1 tablet by mouth daily      levothyroxine 175 mcg tablet Take 1 tablet by mouth daily      metoprolol succinate (TOPROL-XL) 25 mg 24 hr tablet Take 1 tablet by mouth daily      Multiple Vitamin tablet Take 1 tablet by mouth daily      naproxen (NAPROSYN) 375 mg tablet Take 1 tablet (375 mg total) by mouth 2 (two) times a day with meals 20 tablet 0    pantoprazole (PROTONIX) 40 mg tablet Take 1 tablet by mouth 2 (two) times a day        simvastatin (ZOCOR) 40 mg tablet Take 1 tablet by mouth daily      cyclobenzaprine (FLEXERIL) 10 mg tablet Take 1 tablet (10 mg total) by mouth 2 (two) times a day as needed for muscle spasms (Patient not taking: Reported on 10/8/2019) 20 tablet 0    metoprolol tartrate (LOPRESSOR) 25 mg tablet Take 25 mg by mouth 2 (two) times a day  0     No current facility-administered medications for this visit  Allergies  No Known Allergies    Past Medical History, Social History, Family History, medications and allergies were reviewed  Review of Systems  Review of Systems   Constitutional: Negative  HENT: Negative  Eyes: Negative  Respiratory: Negative  Cardiovascular: Negative  Gastrointestinal: Negative  Endocrine: Negative  Genitourinary: Negative for decreased urine volume, difficulty urinating, hematuria and urgency  Musculoskeletal: Negative  Skin: Negative  Neurological: Negative  Hematological: Negative  Psychiatric/Behavioral: Negative  Vitals  Vitals:    10/08/19 1003   BP: 132/70   BP Location: Left arm   Patient Position: Sitting   Cuff Size: Standard   Weight: 77 1 kg (170 lb)   Height: 5' 4" (1 626 m)         Physical Exam    Physical Exam   Constitutional: He is oriented to person, place, and time  He appears well-developed and well-nourished  HENT:   Head: Normocephalic and atraumatic  Eyes: Pupils are equal, round, and reactive to light  Neck: Normal range of motion  Cardiovascular: Normal rate, regular rhythm and normal heart sounds  Pulmonary/Chest: Effort normal and breath sounds normal  No accessory muscle usage  No respiratory distress  Abdominal: Soft  Normal appearance and bowel sounds are normal  There is no tenderness  Genitourinary: Rectum normal, prostate normal and penis normal  No penile tenderness  Genitourinary Comments: Prostate 35 grams  No nodules   Musculoskeletal: Normal range of motion  Neurological: He is alert and oriented to person, place, and time  Skin: Skin is warm, dry and intact     Psychiatric: He has a normal mood and affect  His speech is normal  Cognition and memory are normal    Nursing note and vitals reviewed        Results    Below listed labs, pathology results, and radiology images were personally reviewed:    Lab Results   Component Value Date/Time    PSA 1 0 10/02/2019 11:20 AM    PSA 0 8 01/02/2015 12:08 PM     Lab Results   Component Value Date    CALCIUM 8 6 04/18/2019    K 4 2 04/18/2019    CO2 29 04/18/2019     04/18/2019    BUN 21 04/18/2019    CREATININE 0 85 04/18/2019     Lab Results   Component Value Date    WBC 5 36 04/18/2019    HGB 15 7 04/18/2019    HCT 44 9 04/18/2019    MCV 92 04/18/2019     04/18/2019       No results found for this or any previous visit (from the past 1 hour(s)) ]

## 2019-10-08 ENCOUNTER — OFFICE VISIT (OUTPATIENT)
Dept: UROLOGY | Facility: CLINIC | Age: 69
End: 2019-10-08
Payer: COMMERCIAL

## 2019-10-08 VITALS
SYSTOLIC BLOOD PRESSURE: 132 MMHG | HEIGHT: 64 IN | WEIGHT: 170 LBS | BODY MASS INDEX: 29.02 KG/M2 | DIASTOLIC BLOOD PRESSURE: 70 MMHG

## 2019-10-08 DIAGNOSIS — N52.9 IMPOTENCE: Primary | ICD-10-CM

## 2019-10-08 PROCEDURE — 99213 OFFICE O/P EST LOW 20 MIN: CPT | Performed by: UROLOGY

## 2019-11-22 NOTE — PROGRESS NOTES
Cardiology Consultation     Myriam Gonzalez  [id number]  1950  [facility]  [address]  [address] [address]  [phone]  1  Essential (primary) hypertension     2  Pure hypercholesterolemia     3  Coronary arteriosclerosis       Patient Active Problem List   Diagnosis    Aftercare following surgery of the musculoskeletal system    Impotence       HPI patient is here to establish a cardiac relationship  He has a prior history of essential hypertension, hyperlipidemia and coronary artery disease with prior PCI  He has a history of lymphoma with chemotherapy provided with ongoing care in [place]  Elizabeth Rodrigez He did have an exercise stress test performed October 3, 2019  At that time he exercised 9 5 minutes on a treadmill  The stress EKG was equivocal for ischemia but there was no chest pain and myocardial perfusion imaging was normal   The ejection fraction was 60%  He had had a prior nuclear stress test September 20, 2018 which also looked normal   Patient does have a prior history of coronary disease with multiple stents in place  This was done at [facility] in 2012  At that time he had cardiac catheterization and as best he knows he had five stents placed in one vessel  He will try to obtain a copy of this for us  The cardiologist that he now sees on a regular basis also practices at [facility], Dr Shira Slade  He had been seen remotely by our practice September 14, 2012  He had a lipid profile done April 18, 2019 which demonstrated total cholesterol of 120 with an HDL of 34 and a direct LDL of 59  Patient has had no cardiac symptoms  His vital signs are stable today  He is interested in having a cardiologist closer to home and his primary care physician recommended me    There is a family history of coronary disease in reference to his father who was diagnosed with coronary disease at the age of 80 and he has a brother who has had two heart attacks and has had stents placed  He has had Hodgkin's disease diagnosed in 2000 and non-Hodgkin's lymphoma diagnosed in 2012 and both of these malignancies were also managed at [facility]  He sees Dr Ashwin Mcfarland in reference to hiatal hernia      PMH-  Past Medical History:   Diagnosis Date    Disease of thyroid gland     GERD (gastroesophageal reflux disease)     History of chemotherapy     Hyperlipidemia     Hypertension     Lymphoma (HCC)         SOCIAL HISTORY-  Social History     Socioeconomic History    Marital status: Single     Spouse name: Not on file    Number of children: Not on file    Years of education: Not on file    Highest education level: Not on file   Occupational History    Not on file   Social Needs    Financial resource strain: Not on file    Food insecurity:     Worry: Not on file     Inability: Not on file    Transportation needs:     Medical: Not on file     Non-medical: Not on file   Tobacco Use    Smoking status: Former Smoker    Smokeless tobacco: Never Used   Substance and Sexual Activity    Alcohol use: No    Drug use: No    Sexual activity: Not on file   Lifestyle    Physical activity:     Days per week: Not on file     Minutes per session: Not on file    Stress: Not on file   Relationships    Social connections:     Talks on phone: Not on file     Gets together: Not on file     Attends Worship service: Not on file     Active member of club or organization: Not on file     Attends meetings of clubs or organizations: Not on file     Relationship status: Not on file    Intimate partner violence:     Fear of current or ex partner: Not on file     Emotionally abused: Not on file     Physically abused: Not on file     Forced sexual activity: Not on file   Other Topics Concern    Not on file   Social History Narrative    Not on file        FAMILY HISTORY-  Family History   Problem Relation Age of Onset    No Known Problems Mother     No Known Problems Father        SURGICAL HISTORY-  Past Surgical History:   Procedure Laterality Date    CORONARY ANGIOPLASTY      4 stents    MA KNEE SCOPE,MED/LAT MENISECTOMY Left 8/24/2018    Procedure: ARTHROSCOPIC PARTIAL  LATERAL MENISCECTOMY;  Surgeon: David Tate MD;  Location: [facility];  Service: Orthopedics         Current Outpatient Medications:     ALPRAZolam (XANAX) 0 5 mg tablet, Take 0 5 mg by mouth daily, Disp: , Rfl: 0    aspirin 325 mg tablet, Take 1 tablet by mouth daily, Disp: , Rfl:     cetirizine (ZYRTEC ALLERGY) 10 mg tablet, Take 1 tablet by mouth daily, Disp: , Rfl:     co-enzyme Q-10 30 MG capsule, Take 30 mg by mouth 3 (three) times a day, Disp: , Rfl:     levothyroxine 175 mcg tablet, Take 1 tablet by mouth daily, Disp: , Rfl:     metoprolol tartrate (LOPRESSOR) 25 mg tablet, Take 25 mg by mouth 2 (two) times a day, Disp: , Rfl: 0    Multiple Vitamin tablet, Take 1 tablet by mouth daily, Disp: , Rfl:     pantoprazole (PROTONIX) 40 mg tablet, Take 1 tablet by mouth 2 (two) times a day  , Disp: , Rfl:     Potassium 99 MG TABS, Take by mouth, Disp: , Rfl:     simvastatin (ZOCOR) 40 mg tablet, Take 1 tablet by mouth daily, Disp: , Rfl:     acetaminophen (TYLENOL) 500 mg tablet, Take 500 mg by mouth every 6 (six) hours as needed for mild pain, Disp: , Rfl:     amLODIPine (NORVASC) 5 mg tablet, Take 5 mg by mouth daily  , Disp: , Rfl:     metoprolol succinate (TOPROL-XL) 25 mg 24 hr tablet, Take 1 tablet by mouth daily, Disp: , Rfl:   No Known Allergies  Vitals:    11/27/19 0949   BP: 122/80   BP Location: Left arm   Patient Position: Sitting   Cuff Size: Standard   Pulse: 84   Weight: 77 6 kg (171 lb)   Height: 5' 4" (1 626 m)         Review of Systems:  Review of Systems   All other systems reviewed and are negative        Physical Exam:  Physical Exam   Constitutional: He is oriented to person, place, and time  He appears well-developed and well-nourished  HENT:   Head: Normocephalic and atraumatic  Eyes: Pupils are equal, round, and reactive to light  Conjunctivae are normal    Neck: Normal range of motion  Neck supple  Cardiovascular: Normal rate and normal heart sounds  Pulmonary/Chest: Effort normal and breath sounds normal    Neurological: He is alert and oriented to person, place, and time  Skin: Skin is warm and dry  Psychiatric: He has a normal mood and affect  Vitals reviewed  Discussion/Summary: At the present time the patient appears stable  Will be my privilege to manage his cardiac status  His recent stress test looked good  He will continue his present medical regimen  I have asked him to call if there is a problem in the interim otherwise I will see him in one years time  He did have an EKG in his phone that was done September 10, 2019 which was comparable to a tracing done August 10, 2018 demonstrating sinus rhythm with left axis deviation and minor nonspecific ST segment changes

## 2019-11-27 ENCOUNTER — OFFICE VISIT (OUTPATIENT)
Dept: CARDIOLOGY CLINIC | Facility: CLINIC | Age: 69
End: 2019-11-27
Payer: COMMERCIAL

## 2019-11-27 VITALS
HEART RATE: 84 BPM | SYSTOLIC BLOOD PRESSURE: 122 MMHG | DIASTOLIC BLOOD PRESSURE: 80 MMHG | WEIGHT: 171 LBS | HEIGHT: 64 IN | BODY MASS INDEX: 29.19 KG/M2

## 2019-11-27 DIAGNOSIS — I10 ESSENTIAL (PRIMARY) HYPERTENSION: Primary | ICD-10-CM

## 2019-11-27 DIAGNOSIS — I25.10 CORONARY ARTERIOSCLEROSIS: ICD-10-CM

## 2019-11-27 DIAGNOSIS — E78.00 PURE HYPERCHOLESTEROLEMIA: ICD-10-CM

## 2019-11-27 PROCEDURE — 99244 OFF/OP CNSLTJ NEW/EST MOD 40: CPT | Performed by: INTERNAL MEDICINE

## 2019-11-27 RX ORDER — ALPRAZOLAM 0.5 MG/1
0.5 TABLET ORAL DAILY
Refills: 0 | COMMUNITY
Start: 2019-11-06

## 2020-06-24 ENCOUNTER — TRANSCRIBE ORDERS (OUTPATIENT)
Dept: LAB | Facility: CLINIC | Age: 70
End: 2020-06-24

## 2020-06-24 ENCOUNTER — APPOINTMENT (OUTPATIENT)
Dept: LAB | Facility: CLINIC | Age: 70
End: 2020-06-24
Payer: COMMERCIAL

## 2020-06-24 ENCOUNTER — TRANSCRIBE ORDERS (OUTPATIENT)
Dept: ADMINISTRATIVE | Facility: HOSPITAL | Age: 70
End: 2020-06-24

## 2020-06-24 DIAGNOSIS — E07.9 DISEASE OF THYROID GLAND: ICD-10-CM

## 2020-06-24 DIAGNOSIS — C79.82 SECONDARY MALIGNANT NEOPLASM OF GENITAL ORGANS (HCC): ICD-10-CM

## 2020-06-24 DIAGNOSIS — R68.89 MECHANICAL AND MOTOR PROBLEMS WITH INTERNAL ORGANS: ICD-10-CM

## 2020-06-24 DIAGNOSIS — R79.89 HYPOURICEMIA: ICD-10-CM

## 2020-06-24 DIAGNOSIS — C85.90 LEUCOSARCOMA (HCC): Primary | ICD-10-CM

## 2020-06-24 DIAGNOSIS — R94.6 NONSPECIFIC ABNORMAL RESULTS OF THYROID FUNCTION STUDY: ICD-10-CM

## 2020-06-24 DIAGNOSIS — R73.09 IMPAIRED GLUCOSE TOLERANCE TEST: ICD-10-CM

## 2020-06-24 DIAGNOSIS — E78.5 HYPERLIPIDEMIA, UNSPECIFIED HYPERLIPIDEMIA TYPE: ICD-10-CM

## 2020-06-24 DIAGNOSIS — R94.6 NONSPECIFIC ABNORMAL RESULTS OF THYROID FUNCTION STUDY: Primary | ICD-10-CM

## 2020-06-24 DIAGNOSIS — R97.20 ELEVATED PROSTATE SPECIFIC ANTIGEN (PSA): ICD-10-CM

## 2020-06-24 DIAGNOSIS — Z13.9 SCREENING FOR UNSPECIFIED CONDITION: ICD-10-CM

## 2020-06-24 DIAGNOSIS — N52.9 IMPOTENCE: ICD-10-CM

## 2020-06-24 DIAGNOSIS — R82.90 NONSPECIFIC FINDING ON EXAMINATION OF URINE: ICD-10-CM

## 2020-06-24 LAB
ALBUMIN SERPL BCP-MCNC: 4 G/DL (ref 3.5–5)
ALP SERPL-CCNC: 64 U/L (ref 46–116)
ALT SERPL W P-5'-P-CCNC: 33 U/L (ref 12–78)
ANION GAP SERPL CALCULATED.3IONS-SCNC: 5 MMOL/L (ref 4–13)
AST SERPL W P-5'-P-CCNC: 21 U/L (ref 5–45)
BILIRUB SERPL-MCNC: 0.64 MG/DL (ref 0.2–1)
BILIRUB UR QL STRIP: NEGATIVE
BUN SERPL-MCNC: 22 MG/DL (ref 5–25)
CALCIUM SERPL-MCNC: 9.3 MG/DL (ref 8.3–10.1)
CHLORIDE SERPL-SCNC: 107 MMOL/L (ref 100–108)
CHOLEST SERPL-MCNC: 130 MG/DL (ref 50–200)
CLARITY UR: CLEAR
CO2 SERPL-SCNC: 28 MMOL/L (ref 21–32)
COLOR UR: NORMAL
CREAT SERPL-MCNC: 0.92 MG/DL (ref 0.6–1.3)
CRP SERPL HS-MCNC: 1.25 MG/L
ERYTHROCYTE [DISTWIDTH] IN BLOOD BY AUTOMATED COUNT: 13.2 % (ref 11.6–15.1)
ERYTHROCYTE [SEDIMENTATION RATE] IN BLOOD: 4 MM/HOUR (ref 0–10)
EST. AVERAGE GLUCOSE BLD GHB EST-MCNC: 108 MG/DL
GFR SERPL CREATININE-BSD FRML MDRD: 85 ML/MIN/1.73SQ M
GLUCOSE P FAST SERPL-MCNC: 101 MG/DL (ref 65–99)
GLUCOSE UR STRIP-MCNC: NEGATIVE MG/DL
HBA1C MFR BLD: 5.4 %
HCT VFR BLD AUTO: 46.8 % (ref 36.5–49.3)
HDLC SERPL-MCNC: 34 MG/DL
HGB BLD-MCNC: 16.1 G/DL (ref 12–17)
HGB UR QL STRIP.AUTO: NEGATIVE
KETONES UR STRIP-MCNC: NEGATIVE MG/DL
LDLC SERPL CALC-MCNC: 71 MG/DL (ref 0–100)
LEUKOCYTE ESTERASE UR QL STRIP: NEGATIVE
MCH RBC QN AUTO: 32.3 PG (ref 26.8–34.3)
MCHC RBC AUTO-ENTMCNC: 34.4 G/DL (ref 31.4–37.4)
MCV RBC AUTO: 94 FL (ref 82–98)
NITRITE UR QL STRIP: NEGATIVE
NONHDLC SERPL-MCNC: 96 MG/DL
PH UR STRIP.AUTO: 6.5 [PH]
PLATELET # BLD AUTO: 167 THOUSANDS/UL (ref 149–390)
PMV BLD AUTO: 10.4 FL (ref 8.9–12.7)
POTASSIUM SERPL-SCNC: 4.4 MMOL/L (ref 3.5–5.3)
PROT SERPL-MCNC: 6.5 G/DL (ref 6.4–8.2)
PROT UR STRIP-MCNC: NEGATIVE MG/DL
PSA SERPL-MCNC: 1 NG/ML (ref 0–4)
RBC # BLD AUTO: 4.99 MILLION/UL (ref 3.88–5.62)
SODIUM SERPL-SCNC: 140 MMOL/L (ref 136–145)
SP GR UR STRIP.AUTO: 1.03 (ref 1–1.03)
TRIGL SERPL-MCNC: 127 MG/DL
TSH SERPL DL<=0.05 MIU/L-ACNC: 1.43 UIU/ML (ref 0.36–3.74)
UROBILINOGEN UR QL STRIP.AUTO: 0.2 E.U./DL
WBC # BLD AUTO: 4.81 THOUSAND/UL (ref 4.31–10.16)

## 2020-06-24 PROCEDURE — 85652 RBC SED RATE AUTOMATED: CPT

## 2020-06-24 PROCEDURE — 83036 HEMOGLOBIN GLYCOSYLATED A1C: CPT

## 2020-06-24 PROCEDURE — G0103 PSA SCREENING: HCPCS

## 2020-06-24 PROCEDURE — 80053 COMPREHEN METABOLIC PANEL: CPT

## 2020-06-24 PROCEDURE — 84443 ASSAY THYROID STIM HORMONE: CPT

## 2020-06-24 PROCEDURE — 86141 C-REACTIVE PROTEIN HS: CPT

## 2020-06-24 PROCEDURE — 85027 COMPLETE CBC AUTOMATED: CPT

## 2020-06-24 PROCEDURE — 81003 URINALYSIS AUTO W/O SCOPE: CPT

## 2020-06-24 PROCEDURE — 80061 LIPID PANEL: CPT

## 2020-06-24 PROCEDURE — 36415 COLL VENOUS BLD VENIPUNCTURE: CPT

## 2020-06-30 ENCOUNTER — HOSPITAL ENCOUNTER (OUTPATIENT)
Dept: RADIOLOGY | Age: 70
Discharge: HOME | End: 2020-06-30
Payer: COMMERCIAL

## 2020-06-30 DIAGNOSIS — C85.90 LEUCOSARCOMA (HCC): ICD-10-CM

## 2020-06-30 PROCEDURE — 71260 CT THORAX DX C+: CPT

## 2020-06-30 PROCEDURE — 74177 CT ABD & PELVIS W/CONTRAST: CPT

## 2020-06-30 RX ADMIN — IOHEXOL 100 ML: 350 INJECTION, SOLUTION INTRAVENOUS at 11:32

## 2020-09-03 ENCOUNTER — OFFICE VISIT (OUTPATIENT)
Dept: DERMATOLOGY | Facility: CLINIC | Age: 70
End: 2020-09-03
Payer: COMMERCIAL

## 2020-09-03 VITALS — TEMPERATURE: 96.8 F | HEIGHT: 64 IN | BODY MASS INDEX: 28.17 KG/M2 | WEIGHT: 165 LBS

## 2020-09-03 DIAGNOSIS — C85.90 LYMPHOMA, UNSPECIFIED BODY REGION, UNSPECIFIED LYMPHOMA TYPE (HCC): Primary | ICD-10-CM

## 2020-09-03 DIAGNOSIS — L50.9 URTICARIA: ICD-10-CM

## 2020-09-03 DIAGNOSIS — D48.9 NEOPLASM OF UNCERTAIN BEHAVIOR: ICD-10-CM

## 2020-09-03 PROCEDURE — 99204 OFFICE O/P NEW MOD 45 MIN: CPT | Performed by: DERMATOLOGY

## 2020-09-03 PROCEDURE — 88313 SPECIAL STAINS GROUP 2: CPT | Performed by: STUDENT IN AN ORGANIZED HEALTH CARE EDUCATION/TRAINING PROGRAM

## 2020-09-03 PROCEDURE — 88305 TISSUE EXAM BY PATHOLOGIST: CPT | Performed by: STUDENT IN AN ORGANIZED HEALTH CARE EDUCATION/TRAINING PROGRAM

## 2020-09-03 PROCEDURE — 11104 PUNCH BX SKIN SINGLE LESION: CPT | Performed by: DERMATOLOGY

## 2020-09-03 NOTE — PATIENT INSTRUCTIONS
Assessment and Plan:  Based on a thorough discussion of this condition and the management approach to it (including a comprehensive discussion of the known risks, side effects and potential benefits of treatment), the patient (family) agrees to implement the following specific plan:   Monitor for size    Lipoma  A lipoma is a non-cancerous tumor that is made up of fat cells  It slowly grows under the skin in the subcutaneous tissue  A person may have a single lipoma or may have many lipomas  They are very common  Lipomas can occur in people of all ages, however, they tend to develop in adulthood and are most noticeable during middle age  They affect both sexes equally, although solitary lipomas are more common in women whilst multiple lipomas occur more frequently in men  The cause of lipomas is unknown  It is possible there may be genetic involvement as many patients with lipomas come from a family with a history of these tumors  Sometimes an injury such as a blunt blow to part of the body may trigger growth of a lipoma  People are often unaware of lipomas until they have grown large enough to become visible and palpable  This growth occurs slowly over several years  Some features of lipomas include:   A dome-shaped or egg-shaped lump about 2-10 cm in diameter (some may grow even larger)    It feels soft and smooth and is easily moved under the skin with the fingers    Some have a rubbery or doughy consistency    They are most common on the shoulders, neck, trunk and arms, but they can occur anywhere on the body where fat tissue is present  Most lipomas are symptomless, but some are painful on applying pressure  Lipomas that are tender or painful are usually angiolipomas  This means the lipoma has an increased number of small blood vessels  Painful lipomas are also a feature of adiposis dolorosa or Dercum disease  Diagnosis of lipoma is usually made clinically by finding a soft lump under the skin  However, if there is any doubt, a deep skin biopsy can be performed which will show typical histopathological features of lipoma and its variants  Most lipomas require no treatment  Most lipomas eventually stop growing and remain indefinitely without causing any problems  Occasionally, lipomas that interfere with the movement of adjacent muscles may require surgical removal  Several methods are available:   Simple surgical excision    Squeeze technique (a small incision is made over the lipoma and the fatty tissue is squeezed through the hole)    Liposuction    Assessment and Plan:  Based on a thorough discussion of this condition and the management approach to it (including a comprehensive discussion of the known risks, side effects and potential benefits of treatment), the patient (family) agrees to implement the following specific plan:   Reassured benign    If become irritated and inflamed please contact the office   When outside we recommend using a wide brim hat, sunglasses, long sleeve and pants, sunscreen with SPF 65+ with reapplication every 2 hours, or SPF specific clothing   Recommend uring moisturizer like Eucerin,Cerave or Aveeno Cream 3 times a day for the dry skin          Follow up routine yearly skin exam or sooner if you develop a  New area of concern       Seborrheic Keratosis  A seborrheic keratosis is a harmless warty spot that appears during adult life as a common sign of skin aging  Seborrheic keratoses can arise on any area of skin, covered or uncovered, with the usual exception of the palms and soles  They do not arise from mucous membranes  Seborrheic keratoses can have highly variable appearance  Seborrheic keratoses are extremely common  It has been estimated that over 90% of adults over the age of 61 years have one or more of them  They occur in males and females of all races, typically beginning to erupt in the 35s or 45s  They are uncommon under the age of 21 years  The precise cause of seborrhoeic keratoses is not known  Seborrhoeic keratoses are considered degenerative in nature  As time goes by, seborrheic keratoses tend to become more numerous  Some people inherit a tendency to develop a very large number of them; some people may have hundreds of them  The name "seborrheic keratosis" is misleading, because these lesions are not limited to a seborrhoeic distribution (scalp, mid-face, chest, upper back), nor are they formed from sebaceous glands, nor are they associated with sebum -- which is greasy  Seborrheic keratosis may also be called "SK," "Seb K," "basal cell papilloma," "senile wart," or "barnacle "      Researchers have noted:   Eruptive seborrhoeic keratoses can follow sunburn or dermatitis   Skin friction may be the reason they appear in body folds   Viral cause (e g , human papillomavirus) seems unlikely   Stable and clonal mutations or activation of FRFR3, PIK3CA, CHAVA, AKT1 and EGFR genes are found in seborrhoeic keratoses   Seborrhoeic keratosis can arise from solar lentigo   FRFR3 mutations also arise in solar lentigines  These mutations are associated with increased age and location on the head and neck, suggesting a role of ultraviolet radiation in these lesions   Seborrheic keratoses do not harbour tumour suppressor gene mutations   Epidermal growth factor receptor inhibitors, which are used to treat some cancers, often result in an increase in verrucal (warty) keratoses  There is no easy way to remove multiple lesions on a single occasion  Unless a specific lesion is "inflamed" and is causing pain or stinging/burning or is bleeding, most insurance companies do not authorize treatment  Assessment and Plan:   I have discussed with the patient that a sample of skin via a "skin biopsy would be potentially helpful to further make a specific diagnosis under the microscope     Based on a thorough discussion of this condition and the management approach to it (including a comprehensive discussion of the known risks, side effects and potential benefits of treatment), the patient (family) agrees to implement the following specific plan:    o Procedure:  Skin Biopsy  After a thorough discussion of treatment options and risk/benefits/alternatives (including but not limited to local pain, scarring, dyspigmentation, blistering, possible superinfection, and inability to confirm a diagnosis via histopathology), verbal and written consent were obtained and portion of the rash was biopsied for tissue sample  See below for consent that was obtained from patient and subsequent Procedure Note  Specimen has been sent for review by Dermatopathology  Plan:  1  Instructed to keep the wound dry and covered for 24-48h and clean thereafter  2  Warning signs of infection were reviewed  3  Recommended that the patient use acetaminophen as needed for pain  4  Sutures if any should be removed in 14 days      Standard post-procedure care has been explained and has been included in written form within the patient's copy of Informed Consent

## 2020-09-03 NOTE — PROGRESS NOTES
[facility] Note     Patient Name: Raymond Holly  Encounter Date: 09/03/2020     Have you been cared for by a [facility] Dermatologist in the last 3 years and, if so, which one? No    · Have you traveled outside of the [address] [place] [address] in the past 3 months or outside of the [facility] area in the last 2 weeks? No     May we call your Preferred Phone number to discuss your specific medical information? Yes     May we leave a detailed message that includes your specific medical information? Yes      Today's Chief Concerns:   Concern #1:  Growth on right leg   Concern #2:      Past Medical History:  Have you personally ever had or currently have any of the following? · Skin cancer (such as Melanoma, Basal Cell Carcinoma, Squamous Cell Carcinoma? (If Yes, please provide more detail)- No  · Eczema: No  · Psoriasis: No  · HIV/AIDS: No  · Hepatitis B or C: No  · Tuberculosis: No  · Systemic Immunosuppression such as Diabetes, Biologic or Immunotherapy, Chemotherapy, Organ Transplantation, Bone Marrow Transplantation (If YES, please provide more detail): YES, Chemo in the past 2012  · Radiation Treatment (If YES, please provide more detail): No  · Any other major medical conditions/concerns? 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 Bike ride, outdoors    Family History:  Have any of your "first degree relatives" (parent, brother, sister, or child) had any of the following       · Skin cancer such as Melanoma or Merkel Cell Carcinoma or Pancreatic Cancer? No  · Eczema, Asthma, Hay Fever or Seasonal Allergies: No  · Psoriasis or Psoriatic Arthritis: No  · Do any other medical conditions seem to run in your family? If Yes, what condition and which relatives?   YES, Sister- Breast Cancer; Brother: Heart condition     Current Medications: Current Outpatient Medications:     acetaminophen (TYLENOL) 500 mg tablet, Take 500 mg by mouth every 6 (six) hours as needed for mild pain, Disp: , Rfl:     ALPRAZolam (XANAX) 0 5 mg tablet, Take 0 5 mg by mouth daily, Disp: , Rfl: 0    amLODIPine (NORVASC) 5 mg tablet, Take 5 mg by mouth daily  , Disp: , Rfl:     aspirin 325 mg tablet, Take 1 tablet by mouth daily, Disp: , Rfl:     cetirizine (ZYRTEC ALLERGY) 10 mg tablet, Take 1 tablet by mouth daily, Disp: , Rfl:     co-enzyme Q-10 30 MG capsule, Take 30 mg by mouth 3 (three) times a day, Disp: , Rfl:     levothyroxine 175 mcg tablet, Take 1 tablet by mouth daily, Disp: , Rfl:     metoprolol succinate (TOPROL-XL) 25 mg 24 hr tablet, Take 1 tablet by mouth daily, Disp: , Rfl:     metoprolol tartrate (LOPRESSOR) 25 mg tablet, Take 25 mg by mouth 2 (two) times a day, Disp: , Rfl: 0    Multiple Vitamin tablet, Take 1 tablet by mouth daily, Disp: , Rfl:     pantoprazole (PROTONIX) 40 mg tablet, Take 1 tablet by mouth 2 (two) times a day  , Disp: , Rfl:     Potassium 99 MG TABS, Take by mouth, Disp: , Rfl:     simvastatin (ZOCOR) 40 mg tablet, Take 1 tablet by mouth daily, Disp: , Rfl:       Review of Systems:  Have you recently had or currently have any of the following? If YES, what are you doing for the problem? · Fever, chills or unintended weight loss: No  · Sudden loss or change in your vision: No  · Nausea, vomiting or blood in your stool: No  · Painful or swollen joints: No  · Wheezing or cough: No  · Changing mole or non-healing wound: No  · Nosebleeds: No  · Excessive sweating: No  · Easy or prolonged bleeding? No  · Over the last 2 weeks, how often have you been bothered by the following problems?   · Taking little interest or pleasure in doing things: 1 - Not at All  · Feeling down, depressed, or hopeless: 1 - Not at All  · Rapid heartbeat with epinephrine:  No    · FEMALES ONLY:    · Are you pregnant or planning to become pregnant? N/A  · Are you currently or planning to be nursing or breast feeding? N/A    · Any known allergies? Allergies   Allergen Reactions    Nicotine          Physical Exam:     Was a chaperone (Derm Clinical Assistant) present throughout the entire Physical Exam? Yes     Did the Dermatology Team specifically  the patient on the importance of a Full Skin Exam to be sure that nothing is missed clinically?  Yes}  o Did the patient ultimately request or accept a Full Skin Exam?  Yes  o Did the patient specifically refuse to have the areas "under-the-bra" examined by the Dermatologist? No  o Did the patient specifically refuse to have the areas "under-the-underwear" examined by the Dermatologist? No    CONSTITUTIONAL:   Vitals:    09/03/20 0811   Temp: (!) 96 8 °F (36 °C)   Weight: 74 8 kg (165 lb)   Height: 5' 4" (1 626 m)         PSYCH: Normal mood and affect  EYES: Normal conjunctiva  ENT: Normal lips and oral mucosa  CARDIOVASCULAR: No edema  RESPIRATORY: Normal respirations  HEME/LYMPH/IMMUNO:  No regional lymphadenopathy except as noted below in "ASSESSMENT AND PLAN BY DIAGNOSIS"    SKIN:  FULL ORGAN SYSTEM EXAM   Hair, Scalp, Ears, Face Normal except as noted below in Assessment   Neck, Cervical Chain Nodes Normal except as noted below in Assessment   Right Arm/Hand/Fingers Normal except as noted below in Assessment   Left Arm/Hand/Fingers Normal except as noted below in Assessment   Chest/Breasts/Axillae Viewed areas Normal except as noted below in Assessment   Abdomen, Umbilicus Normal except as noted below in Assessment   Back/Spine Normal except as noted below in Assessment   Groin/Genitalia/Buttocks NOT EXAMINED   Right Leg, Foot, Toes Normal except as noted below in Assessment   Left Leg, Foot, Toes Normal except as noted below in Assessment        Assessment and Plan by Diagnosis:    History of Present Condition:     Duration:  How long has this been an issue for you?    o  years  Location Affected:  Where on the body is this affecting you?    o  right leg   Quality:  Is there any bleeding, pain, itch, burning/irritation, or redness associated with the skin lesion? o  itches   Severity:  Describe any bleeding, pain, itch, burning/irritation, or redness on a scale of 1 to 10 (with 10 being the worst)  o  8   Timing:  Does this condition seem to be there pretty constantly or do you notice it more at specific times throughout the day?    o  Constant    Context:  Have you ever noticed that this condition seems to be associated with specific activities you do?    o  Denies   Modifying Factors:    o Anything that seems to make the condition worse?    -  Denies  o What have you tried to do to make the condition better?    -  regular lotion    Associated Signs and Symptoms:  Does this skin lesion seem to be associated with any of the following:  o  SL AMB DERM SIGNS AND SYMPTOMS: Itching and Scratching     LIPOMA    Physical Exam:   Anatomic Location Affected:  Left upper arm   Morphological Description:   1 5CM SUBCUTANEOUS SOFT NODULE   Pertinent Positives:   Pertinent Negatives: Additional History of Present Condition:  Patient is present for routine skin exam      Assessment and Plan:  Based on a thorough discussion of this condition and the management approach to it (including a comprehensive discussion of the known risks, side effects and potential benefits of treatment), the patient (family) agrees to implement the following specific plan:   Monitor for size    Lipoma  A lipoma is a non-cancerous tumor that is made up of fat cells  It slowly grows under the skin in the subcutaneous tissue  A person may have a single lipoma or may have many lipomas  They are very common  Lipomas can occur in people of all ages, however, they tend to develop in adulthood and are most noticeable during middle age   They affect both sexes equally, although solitary lipomas are more common in women whilst multiple lipomas occur more frequently in men  The cause of lipomas is unknown  It is possible there may be genetic involvement as many patients with lipomas come from a family with a history of these tumors  Sometimes an injury such as a blunt blow to part of the body may trigger growth of a lipoma  People are often unaware of lipomas until they have grown large enough to become visible and palpable  This growth occurs slowly over several years  Some features of lipomas include:   A dome-shaped or egg-shaped lump about 2-10 cm in diameter (some may grow even larger)    It feels soft and smooth and is easily moved under the skin with the fingers    Some have a rubbery or doughy consistency    They are most common on the shoulders, neck, trunk and arms, but they can occur anywhere on the body where fat tissue is present  Most lipomas are symptomless, but some are painful on applying pressure  Lipomas that are tender or painful are usually angiolipomas  This means the lipoma has an increased number of small blood vessels  Painful lipomas are also a feature of adiposis dolorosa or Dercum disease  Diagnosis of lipoma is usually made clinically by finding a soft lump under the skin  However, if there is any doubt, a deep skin biopsy can be performed which will show typical histopathological features of lipoma and its variants  Most lipomas require no treatment  Most lipomas eventually stop growing and remain indefinitely without causing any problems   Occasionally, lipomas that interfere with the movement of adjacent muscles may require surgical removal  Several methods are available:   Simple surgical excision    Squeeze technique (a small incision is made over the lipoma and the fatty tissue is squeezed through the hole)    Liposuction    SEBORRHEIC KERATOSIS; NON-INFLAMED    Physical Exam:   Anatomic Location Affected:  Right leg   Morphological Description: 1 cm verrucous papule  Pertinent Positives:   Pertinent Negatives: Additional History of Present Condition:      Assessment and Plan:  Based on a thorough discussion of this condition and the management approach to it (including a comprehensive discussion of the known risks, side effects and potential benefits of treatment), the patient (family) agrees to implement the following specific plan:   Reassured benign    If become irritated and inflamed please contact the office   When outside we recommend using a wide brim hat, sunglasses, long sleeve and pants, sunscreen with SPF 39+ with reapplication every 2 hours, or SPF specific clothing   Recommend uring moisturizer like Eucerin,Cerave or Aveeno Cream 3 times a day for the dry skin          Follow up routine yearly skin exam or sooner if you develop a  New area of concern       Seborrheic Keratosis  A seborrheic keratosis is a harmless warty spot that appears during adult life as a common sign of skin aging  Seborrheic keratoses can arise on any area of skin, covered or uncovered, with the usual exception of the palms and soles  They do not arise from mucous membranes  Seborrheic keratoses can have highly variable appearance  Seborrheic keratoses are extremely common  It has been estimated that over 90% of adults over the age of 61 years have one or more of them  They occur in males and females of all races, typically beginning to erupt in the 35s or 45s  They are uncommon under the age of 21 years  The precise cause of seborrhoeic keratoses is not known  Seborrhoeic keratoses are considered degenerative in nature  As time goes by, seborrheic keratoses tend to become more numerous  Some people inherit a tendency to develop a very large number of them; some people may have hundreds of them      The name "seborrheic keratosis" is misleading, because these lesions are not limited to a seborrhoeic distribution (scalp, mid-face, chest, upper back), nor are they formed from sebaceous glands, nor are they associated with sebum -- which is greasy  Seborrheic keratosis may also be called "SK," "Seb K," "basal cell papilloma," "senile wart," or "barnacle "      Researchers have noted:   Eruptive seborrhoeic keratoses can follow sunburn or dermatitis   Skin friction may be the reason they appear in body folds   Viral cause (e g , human papillomavirus) seems unlikely   Stable and clonal mutations or activation of FRFR3, PIK3CA, CHAVA, AKT1 and EGFR genes are found in seborrhoeic keratoses   Seborrhoeic keratosis can arise from solar lentigo   FRFR3 mutations also arise in solar lentigines  These mutations are associated with increased age and location on the head and neck, suggesting a role of ultraviolet radiation in these lesions   Seborrheic keratoses do not harbour tumour suppressor gene mutations   Epidermal growth factor receptor inhibitors, which are used to treat some cancers, often result in an increase in verrucal (warty) keratoses  There is no easy way to remove multiple lesions on a single occasion  Unless a specific lesion is "inflamed" and is causing pain or stinging/burning or is bleeding, most insurance companies do not authorize treatment  RASH    Physical Exam:   (Anatomic Location); (Size and Morphological Description); (Differential Diagnosis):      Specimen Letter: A       Specimen Type: Skin       Anatomical Location: Right Flank       Procedure Type: Punch Biopsy       Size of Lesion: 4 mm       Margins (if any):       Morphological Description (including dermoscopy findings): demetous erythematous papules with lichenifications diffusely special left and right flank       Differential Diagnosis/Specific Clinical Question: Eczema versus T Cell Lymphoma        Association with previous path ([facility] Accession #):   Pertinent Positives:   Pertinent Negatives:     Additional History of Present Condition:  Patient is present for routine skin exam      Assessment and Plan:    COMMENT:   new onset generalized pruritus with secondary eczematous change     Probably idiopathic but given history of lymphoma will pursue evaluation with biopys and cell markers  Early onset TCell and szearys may present in this manner but very rarely   I have discussed with the patient that a sample of skin via a "skin biopsy would be potentially helpful to further make a specific diagnosis under the microscope   Based on a thorough discussion of this condition and the management approach to it (including a comprehensive discussion of the known risks, side effects and potential benefits of treatment), the patient (family) agrees to implement the following specific plan:    o Procedure:  Skin Biopsy  After a thorough discussion of treatment options and risk/benefits/alternatives (including but not limited to local pain, scarring, dyspigmentation, blistering, possible superinfection, and inability to confirm a diagnosis via histopathology), verbal and written consent were obtained and portion of the rash was biopsied for tissue sample  See below for consent that was obtained from patient and subsequent Procedure Note  PROCEDURE NOTE:  PUNCH BIOPSY      Performing Physician: Dr Davis    Anatomic Location; Clinical Description with size (cm); Pre-Op Diagnosis:        Specimen Letter: A       Specimen Type: Skin       Anatomical Location: Right Flank       Procedure Type: Punch Biopsy       Size of Lesion: 4 mm       Margins (if any):       Morphological Description (including dermoscopy findings): demetous erythematous papules with lichenifications diffusely special left and right flank       Differential Diagnosis/Specific Clinical Question: Eczema versus T Cell Lymphoma        Association with previous path ([facility] Accession #):         Anesthesia: 1% xylocaine with epi       Topical anesthesia: None       Indications:  To indicate diagnosis and management plan  Procedure Details     Patient informed of the risks (including bleeding,scaring and infection) and benefits of the procedure explained  Verbal and written informed consent obtained  The area was prepped and draped in the usual fashion  Anesthesia was obtained with 1% lidocaine with epinephrine  The skin was then stretched perpendicular to the skin tension lines and a punch biopsy to an appropriate sampling depth was obtained with a 4 mm punch with a forceps and iris scissors  Hemostasis was obtained with 4-0 Ethilon x 1 sutures  Complications:  None      Specimen has been sent for review by Dermatopathology  Plan:  1  Instructed to keep the wound dry and covered for 24-48h and clean thereafter  2  Warning signs of infection were reviewed  3  Recommended that the patient use acetaminophen as needed for pain  4  Sutures if any should be removed in 14 days      Standard post-procedure care has been explained and has been included in written form within the patient's copy of Informed Consent              Scribe Attestation    I,:   Umang Connell MA am acting as a scribe while in the presence of the attending physician :        I,:   Duke Delaney MD personally performed the services described in this documentation    as scribed in my presence :

## 2020-09-08 ENCOUNTER — LAB (OUTPATIENT)
Dept: LAB | Facility: CLINIC | Age: 70
End: 2020-09-08
Payer: COMMERCIAL

## 2020-09-08 DIAGNOSIS — C85.90 LYMPHOMA, UNSPECIFIED BODY REGION, UNSPECIFIED LYMPHOMA TYPE (HCC): ICD-10-CM

## 2020-09-08 PROCEDURE — 36415 COLL VENOUS BLD VENIPUNCTURE: CPT

## 2020-09-08 PROCEDURE — 88185 FLOWCYTOMETRY/TC ADD-ON: CPT

## 2020-09-08 PROCEDURE — 88184 FLOWCYTOMETRY/ TC 1 MARKER: CPT

## 2020-09-09 LAB — SCAN RESULT: NORMAL

## 2020-09-09 NOTE — RESULT ENCOUNTER NOTE
DERMATOPATHOLOGY RESULT NOTE    Results reviewed by ordering physician  Called patient to personally discuss results  Discussed results with patient  Instructions for Clinical Derm Team:   (remember to route Result Note to appropriate staff):    None    Result & Plan by Specimen:    Specimen A: benign  Plan: will followup in clinic  Will review eczema and other possible causes  Await flow cytometry      Surgical Pathology Report                         Case: [id number]                                    Authorizing Provider: Duke Delaney MD          Collected:           09/03/2020 0839               Ordering Location:     [facility]      Received:            09/03/2020 0839                                      [place]                                                                        Pathologist:           Jarvis Huerta MD                                                            Specimen:    Skin, Other, Specimen A Right Flank                                                       Final Diagnosis   A  Skin, right flank, punch biopsy:     Superficial perivascular lymphocytic infiltrate with foci of intravascular neutrophils (see note)      Note: Pathogenic microorganisms are not seen on PAS stain  The histologic findings are non-specific; in the appropriate clinical context, they may be consistent with HYPERSENSITIVITY REACTION (e g , to a drug, other)  Clinical pathological correlation is advised   Definite evidence of a lymphoid dyscrasia is not seen      Electronically signed by Jarvis Huerta MD on 9/8/2020 at  1:20 PM   Additional Information    All reported additional testing was performed with appropriately reactive controls   These tests were developed and their performance characteristics determined by [facility] or appropriate performing facility, though some tests may be performed on tissues which have not been validated for performance characteristics (such as staining performed on alcohol exposed cell blocks and decalcified tissues)   Results should be interpreted with caution and in the context of the patients' clinical condition  These tests may not be cleared or approved by the [place]  Food and Drug Administration, though the FDA has determined that such clearance or approval is not necessary  These tests are used for clinical purposes and they should not be regarded as investigational or for research  This laboratory has been approved by [place] [id number], designated as a high-complexity laboratory and is qualified to perform these tests  [facility] Description    A  The specimen is received in formalin, labeled with the patient's name and hospital number, and is designated "right flank"  The specimen consists of a 0 3 x 0 3 cm punch biopsy of tan white non hair-bearing skin excised to a depth of 0 5 cm  The epithelial surface is inked red and the margin of resection is inked green  The specimen is entirely submitted between sponges, 1 cassette      Note: The estimated total formalin fixation time based upon information provided by the submitting clinician and the standard processing schedule is under 72 hours      Chris       Clinical Information    ATTENTION:  Dr Vicki Graham     Relevant Clinical History: Sharad King is 72years old with history of Hodgkin's and Lymphoma Small Cell   Specimen Letter:  A       Specimen Type: Skin       Anatomical Location: Right Flank       Procedure Type: Punch Biopsy       Size of Lesion: 4 mm       Margins (if any):       Morphological Description (including dermoscopy findings): demetous erythematous papules with lichenifications diffusely special left and right flank        Differential Diagnosis/Specific Clinical Question: Eczema versus T Cell Lymphoma         Association with previous path ([facility] Accession #):     ------------------------   Resulting Agency  [id number]       Specimen Collected: 09/03/20  8:39 AM  Last Resulted: 09/08/20  1:20 PM      Order Details      View Encounter      Lab and Collection Details      Routing      Result History         Scans on Order [id number]       Lab Result Document - Document on 9/8/2020  1:20 PM           Status of Other Orders     Expected    Leukemia/Lymphoma flow cytometry  By 09/03/21         Routing History   Expand All  Collapse All     Priority  Sent On  From  To  Last Action  Message Type    9/8/2020  1:20 PM  Lab, Background User  Bernardino Sanders MD  Result Note at 9/9/2020 10:54 AM  Results    Detailed Action Log

## 2020-09-10 ENCOUNTER — OFFICE VISIT (OUTPATIENT)
Dept: PHYSICAL THERAPY | Facility: REHABILITATION | Age: 70
End: 2020-09-10
Payer: COMMERCIAL

## 2020-09-10 DIAGNOSIS — M54.2 NECK PAIN: Primary | ICD-10-CM

## 2020-09-10 DIAGNOSIS — M54.50 LUMBAR PAIN: ICD-10-CM

## 2020-09-10 PROCEDURE — 97110 THERAPEUTIC EXERCISES: CPT | Performed by: PHYSICAL THERAPIST

## 2020-09-10 PROCEDURE — 97140 MANUAL THERAPY 1/> REGIONS: CPT | Performed by: PHYSICAL THERAPIST

## 2020-09-10 PROCEDURE — 97162 PT EVAL MOD COMPLEX 30 MIN: CPT | Performed by: PHYSICAL THERAPIST

## 2020-09-10 NOTE — PROGRESS NOTES
PT Evaluation     Today's date: 9/10/2020  Patient name: Shelley Aguilar  : 1950  MRN: [id number]  Referring provider: Mitali Smith PT  Dx:   Encounter Diagnosis     ICD-10-CM    1  Neck pain  M54 2    2  Lumbar pain  M54 5        Start Time: 1700  Stop Time: 1750  Total time in clinic (min): 50 minutes    Assessment  Assessment details: 71 y/o male with c/o left-sided neck pain and left-sided LBP  The LBP started a few months ago  He denies any B/B changes or changes in sx's with Valsalva  He denies any trauma and feels his sx's increased after a lot of sitting with the pandemic  The neck pain started a few days ago  He denies any throbbing or HA's  The neck pain is worse with cx extension  Also, he got a new leather recliner about a year ago  He sits in the recliner to watch TV and read  He feels his neck is more extended due do the position of the recliner  (-) 5 D's  (-) 3 N's  IE being performed as Direct Access  DA period expires on 10/09/20     Impairments: abnormal muscle firing, abnormal muscle tone, abnormal or restricted ROM, lacks appropriate home exercise program, pain with function and poor posture     Symptom irritability: highUnderstanding of Dx/Px/POC: good   Prognosis: good    Goals  ST - I with HEP  2 - cx extension arom = pain-free  LT - FOTO (lx) > 75  2 - sit in chair > 20 minutes while watching TV with < 2/10 neck pain  3 - sit > 40 minutes while at work with < 1/10 LBP  4 - lift 25# from [id number] without limitation due to LBP  5 - lift 5# OH without limitation due to neck pain    Plan  Patient would benefit from: skilled physical therapy  Planned therapy interventions: joint mobilization, activity modification, neuromuscular re-education, patient education, postural training, home exercise program, strengthening, stretching, therapeutic activities and therapeutic exercise  Frequency: 1x week  Duration in weeks: 5  Treatment plan discussed with: patient        Subjective Evaluation    Quality of life: good    Pain  Current pain ratin  At best pain rating: 3  At worst pain ratin  Quality: tight, sharp, discomfort, dull ache, knife-like and pressure  Relieving factors: change in position and heat  Aggravating factors: standing, sitting, lifting and overhead activity    Social Support  Lives with: spouse    Hand dominance: right      Diagnostic Tests  No diagnostic tests performed  Treatments  Previous treatment: massage  Patient Goals  Patient goals for therapy: decreased pain, increased motion, increased strength, independence with ADLs/IADLs and return to sport/leisure activities          Objective     Concurrent Complaints  Negative for faints, trouble swallowing, difficulty breathing, bladder dysfunction, bowel dysfunction and history of trauma    Postural Observations  Correction of posture: makes symptoms better        Palpation   Left   Hypertonic in the scalenes and suboccipitals  Hypotonic in the levator scapulae  Tenderness of the levator scapulae, scalenes, sternocleidomastoid, suboccipitals and upper trapezius       Neurological Testing     Sensation   Cervical/Thoracic   Left   Intact: pin prick    Right   Intact: pin prick    Lumbar   Left   Intact: pin prick    Right   Intact: pin prick    Reflexes   Left   Biceps (C5/C6): normal (2+)  Brachioradialis (C6): normal (2+)  Patellar (L4): normal (2+)    Right   Biceps (C5/C6): normal (2+)  Brachioradialis (C6): normal (2+)  Patellar (L4): normal (2+)    Active Range of Motion   Cervical/Thoracic Spine       Cervical    Flexion:  Restriction level: minimal  Extension:  with pain Restriction level: maximal  Left lateral flexion:  with pain Restriction level: moderate  Right lateral flexion:  Restriction level minimal    Lumbar   Flexion:  Restriction level: minimal  Extension:  Restriction level: minimal    Strength/Myotome Testing     Lumbar   Left   Normal strength    Right   Normal strength    Tests     Left Hip   Negative long sit  Right Hip   Negative long sit       General Comments:      Lumbar Comments  FOTO (lx) = 59    Cervical/Thoracic Comments  (-) TTP left rib 2  (+) TTP left rib 1      Flowsheet Rows      Most Recent Value   PT/OT G-Codes   Current Score  59   Projected Score  75             Precautions: HTN      Manuals 09/10            Supine C0-1 - flexion bias LMR            Seated tissue deformation - left scalenes LMR            Seated tissue deformation - left UT LMR            cx distraction LMR                                                                             Neuro Re-Ed 09/10                                                                                                       Ther Ex 09/10            HEP LMR            Pt education LMR            Supine long coli 15" - 2x5            Postural checks LMR                                                                Ther Activity                                       Gait Training                                       Modalities

## 2020-09-15 ENCOUNTER — OFFICE VISIT (OUTPATIENT)
Dept: PHYSICAL THERAPY | Facility: REHABILITATION | Age: 70
End: 2020-09-15
Payer: COMMERCIAL

## 2020-09-15 DIAGNOSIS — M54.50 LUMBAR PAIN: Primary | ICD-10-CM

## 2020-09-15 PROCEDURE — 97140 MANUAL THERAPY 1/> REGIONS: CPT | Performed by: PHYSICAL THERAPIST

## 2020-09-15 PROCEDURE — 97112 NEUROMUSCULAR REEDUCATION: CPT | Performed by: PHYSICAL THERAPIST

## 2020-09-15 PROCEDURE — 97110 THERAPEUTIC EXERCISES: CPT | Performed by: PHYSICAL THERAPIST

## 2020-09-15 NOTE — PROGRESS NOTES
Daily Note     Today's date: 9/15/2020  Patient name: Alyssa Fry  : 1950  MRN: [id number]  Referring provider: Will Grande, PT  Dx:   Encounter Diagnosis     ICD-10-CM    1  Lumbar pain  M54 5        Start Time: 0  Stop Time: 8604  Total time in clinic (min): 45 minutes    Subjective:   Pt c/o left-sided lumbar discomfort and left hip tightness  Objective: See treatment diary below  HEP updated with u/l bridges to be completed daily  Assessment: Tolerated treatment well  Patient would benefit from continued PT      Plan: Continue per plan of care  hold tx for the cx region at this time due to being diagnosed with shingles        Precautions: HTN      Manuals 09/10 09/15           Supine C0-1 - flexion bias LMR            Seated tissue deformation - left scalenes LMR            Seated tissue deformation - left UT LMR            cx distraction LMR                         Supine left hip prom  LMR           Tissue deformation - left ant hip  LMR                                     Neuro Re-Ed 09/10 09/15           U/l bridges  5" - 3x5 b/l                                                                                         Ther Ex 09/10 09/15           HEP LMR LMR           Pt education LMR            Supine long coli 15" - 2x5            Postural checks LMR            VG - b/l  L7 - 4'           VG - u/l  L7 - 5x5                                     Ther Activity                                       Gait Training                                       Modalities

## 2020-09-17 ENCOUNTER — OFFICE VISIT (OUTPATIENT)
Dept: DERMATOLOGY | Facility: CLINIC | Age: 70
End: 2020-09-17

## 2020-09-17 DIAGNOSIS — Z48.02 ENCOUNTER FOR REMOVAL OF SUTURES: Primary | ICD-10-CM

## 2020-09-17 PROCEDURE — RECHECK: Performed by: STUDENT IN AN ORGANIZED HEALTH CARE EDUCATION/TRAINING PROGRAM

## 2020-09-17 NOTE — PROGRESS NOTES
Suture removal    Date/Time: 9/17/2020 8:47 AM  Performed by: Arpit Vanegas  Authorized by: Jorje Villanueva MD     Patient location:  Clinic  Consent:     Consent obtained:  Verbal    Consent given by:  Patient    Alternatives discussed:  No treatment  Universal protocol:     Procedure explained and questions answered to patient or proxy's satisfaction: yes    Location:     Laterality:  Right    Location:  Trunk    Trunk location:  Flank    Flank location:  R flank  Procedure details: Tools used:  Scalpel and tweezers    Wound appearance:  No sign(s) of infection, clean and pink  Post-procedure details:     Post-removal:  Band-Aid applied      Patient also recently diagnosed with VZV (left shoulder/upper chest) by PCP and was given a 5 day course of acyclovir  Many lesions have crusted over but with few remaining vesicles  Advised to continue course until complete and can also do vinegar baths to help dry out vesicles        Scribe Attestation    I,:   Arpit Vanegas am acting as a scribe while in the presence of the attending physician :        I,:   Jorje Villanueva MD personally performed the services described in this documentation    as scribed in my presence :

## 2020-09-22 ENCOUNTER — OFFICE VISIT (OUTPATIENT)
Dept: PHYSICAL THERAPY | Facility: REHABILITATION | Age: 70
End: 2020-09-22
Payer: COMMERCIAL

## 2020-09-22 DIAGNOSIS — M54.50 LUMBAR PAIN: Primary | ICD-10-CM

## 2020-09-22 PROCEDURE — 97140 MANUAL THERAPY 1/> REGIONS: CPT | Performed by: PHYSICAL THERAPIST

## 2020-09-22 PROCEDURE — 97110 THERAPEUTIC EXERCISES: CPT | Performed by: PHYSICAL THERAPIST

## 2020-09-22 NOTE — PROGRESS NOTES
Daily Note     Today's date: 2020  Patient name: Justine Zambrano  : 1950  MRN: [id number]  Referring provider: Basilia Zhao, PT  Dx:   Encounter Diagnosis     ICD-10-CM    1  Lumbar pain  M54 5        Start Time: 3409  Stop Time: 1200  Total time in clinic (min): 55 minutes    Subjective: Pt c/o a "pain" in the left glute region that peripheralizes into the post/lat thigh  (but not distal to his knee)    Objective: See treatment diary below  HEP updated with standing fig-4 hip stretch on wall and standing backbends  Increased tissue texture density of the left deep hip rotaters  Assessment: Tolerated treatment well  Patient would benefit from continued PT    Plan: Continue per plan of care        Precautions: HTN      Manuals 09/10 09/15 09/22          Supine C0-1 - flexion bias LMR            Seated tissue deformation - left scalenes LMR            Seated tissue deformation - left UT LMR            cx distraction LMR                         Supine left hip prom  LMR LMR          Tissue deformation - left ant hip  LMR           Supine hip add stretch   LMR          S/l quad stretch   LMR                                    Tissue deformation - deep hip rotaters   LMR          Prone hip ir/er prom   LMR          Neuro Re-Ed 09/10 09/15           U/l bridges  5" - 3x5 b/l                                                                                         Ther Ex 09/10 09/15 09/22          HEP LMR LMR LMR          Pt education LMR  LMR          Supine long coli 15" - 2x5            Postural checks LMR            VG - b/l  L7 - 4'           VG - u/l  L7 - 5x5           Halo clamshells   Yellow - 3x11          Halo sit-to-stand   Yellow - 2x9          Stand fig-4 hip stretch   2'          Standing backbends   12                       Ther Activity                                       Gait Training                                       Modalities

## 2020-09-29 ENCOUNTER — OFFICE VISIT (OUTPATIENT)
Dept: PHYSICAL THERAPY | Facility: REHABILITATION | Age: 70
End: 2020-09-29
Payer: COMMERCIAL

## 2020-09-29 DIAGNOSIS — M54.50 LUMBAR PAIN: Primary | ICD-10-CM

## 2020-09-29 PROCEDURE — 97110 THERAPEUTIC EXERCISES: CPT | Performed by: PHYSICAL THERAPIST

## 2020-09-29 PROCEDURE — 97140 MANUAL THERAPY 1/> REGIONS: CPT | Performed by: PHYSICAL THERAPIST

## 2020-09-29 NOTE — PROGRESS NOTES
Daily Note     Today's date: 2020  Patient name: Garcia Rees  : 1950  MRN: [id number]  Referring provider: Rickie Hector, PT  Dx:   Encounter Diagnosis     ICD-10-CM    1  Lumbar pain  M54 5        Start Time: 945  Stop Time:   Total time in clinic (min): 55 minutes    Subjective: Pt notes compliance with the current HEP  He is really starting to see improvements  Objective: See treatment diary below  HEP updated with T&G sit-to-stand, "pooping dog", and standing fig-4 hip stretch with right trunk rotation  VC's needed during TE for hip activation for knee control  Assessment: Tolerated treatment well  Patient would benefit from continued PT      Plan: Continue per plan of care   re-eval next visit     Precautions: HTN      Manuals 09/10 09/15 09/22 09/29         Supine C0-1 - flexion bias LMR            Seated tissue deformation - left scalenes LMR            Seated tissue deformation - left UT LMR            cx distraction LMR            Supine L hip flexion     LMR         Supine left hip prom  LMR LMR LMR         Tissue deformation - left ant hip  LMR           Supine hip add stretch   LMR          S/l quad stretch   LMR LMR         Prone fig-4 P-A hip mobs    Gr 2 - LMR         Prone quad stretch    LMR         Tissue deformation - deep hip rotaters   LMR LMR         Prone hip ir/er prom   LMR LMR         Neuro Re-Ed 09/10 09/15           U/l bridges  5" - 3x5 b/l                                                                                         Ther Ex 09/10 09/15 09/22 09/29         HEP LMR LMR LMR LMR         Pt education LMR  LMR          Supine long coli 15" - 2x5            Postural checks LMR            VG - b/l  L7 - 4'           VG - u/l  L7 - 5x5  L5 - 3'         Halo clamshells   Yellow - 3x11          Halo sit-to-stand   Yellow - 2x9          Stand fig-4 hip stretch   2'          Standing backbends   12 5         Stand fig-4 hip stretch with right trunk rotation    2x5         q-ped - PPT with sit back stretch    3x5         VG - s/l    L4 - 3x15         T&G sit-to-stand    2x5                                                             Ther Activity                                       Gait Training                                       Modalities

## 2020-10-06 ENCOUNTER — EVALUATION (OUTPATIENT)
Dept: PHYSICAL THERAPY | Facility: REHABILITATION | Age: 70
End: 2020-10-06
Payer: COMMERCIAL

## 2020-10-06 DIAGNOSIS — M54.50 LUMBAR PAIN: Primary | ICD-10-CM

## 2020-10-06 PROCEDURE — 97112 NEUROMUSCULAR REEDUCATION: CPT | Performed by: PHYSICAL THERAPIST

## 2020-10-06 PROCEDURE — 97110 THERAPEUTIC EXERCISES: CPT | Performed by: PHYSICAL THERAPIST

## 2020-10-06 PROCEDURE — 97140 MANUAL THERAPY 1/> REGIONS: CPT | Performed by: PHYSICAL THERAPIST

## 2020-10-29 ENCOUNTER — OFFICE VISIT (OUTPATIENT)
Dept: DERMATOLOGY | Facility: CLINIC | Age: 70
End: 2020-10-29
Payer: COMMERCIAL

## 2020-10-29 VITALS — TEMPERATURE: 97.6 F | BODY MASS INDEX: 28.61 KG/M2 | HEIGHT: 64 IN | WEIGHT: 167.6 LBS

## 2020-10-29 DIAGNOSIS — L85.3 XEROSIS OF SKIN: ICD-10-CM

## 2020-10-29 DIAGNOSIS — B02.29 POST HERPETIC NEURALGIA: ICD-10-CM

## 2020-10-29 DIAGNOSIS — T78.40XA HYPERSENSITIVITY REACTION, INITIAL ENCOUNTER: Primary | ICD-10-CM

## 2020-10-29 PROCEDURE — 99213 OFFICE O/P EST LOW 20 MIN: CPT | Performed by: DERMATOLOGY

## 2020-10-29 RX ORDER — GABAPENTIN 300 MG/1
300 CAPSULE ORAL 3 TIMES DAILY
COMMUNITY
End: 2021-11-17

## 2020-11-11 ENCOUNTER — OFFICE VISIT (OUTPATIENT)
Dept: CARDIOLOGY CLINIC | Facility: CLINIC | Age: 70
End: 2020-11-11
Payer: COMMERCIAL

## 2020-11-11 VITALS
TEMPERATURE: 98.4 F | DIASTOLIC BLOOD PRESSURE: 80 MMHG | SYSTOLIC BLOOD PRESSURE: 122 MMHG | BODY MASS INDEX: 28.85 KG/M2 | HEART RATE: 65 BPM | HEIGHT: 64 IN | WEIGHT: 169 LBS

## 2020-11-11 DIAGNOSIS — I10 ESSENTIAL (PRIMARY) HYPERTENSION: Primary | ICD-10-CM

## 2020-11-11 DIAGNOSIS — E78.00 PURE HYPERCHOLESTEROLEMIA: ICD-10-CM

## 2020-11-11 DIAGNOSIS — I25.10 CORONARY ARTERIOSCLEROSIS: ICD-10-CM

## 2020-11-11 PROCEDURE — 93000 ELECTROCARDIOGRAM COMPLETE: CPT | Performed by: INTERNAL MEDICINE

## 2020-11-11 PROCEDURE — 99214 OFFICE O/P EST MOD 30 MIN: CPT | Performed by: INTERNAL MEDICINE

## 2020-11-11 RX ORDER — LANOLIN ALCOHOL/MO/W.PET/CERES
CREAM (GRAM) TOPICAL
COMMUNITY

## 2020-11-11 RX ORDER — MAGNESIUM 200 MG
TABLET ORAL
COMMUNITY

## 2020-11-29 ENCOUNTER — OFFICE VISIT (OUTPATIENT)
Dept: URGENT CARE | Age: 70
End: 2020-11-29
Payer: COMMERCIAL

## 2020-11-29 VITALS — HEART RATE: 68 BPM | RESPIRATION RATE: 18 BRPM | TEMPERATURE: 98.6 F | OXYGEN SATURATION: 98 %

## 2020-11-29 DIAGNOSIS — Z20.822 EXPOSURE TO COVID-19 VIRUS: Primary | ICD-10-CM

## 2020-11-29 DIAGNOSIS — J02.9 SORE THROAT: ICD-10-CM

## 2020-11-29 PROCEDURE — S9083 URGENT CARE CENTER GLOBAL: HCPCS | Performed by: PHYSICIAN ASSISTANT

## 2020-11-29 PROCEDURE — U0003 INFECTIOUS AGENT DETECTION BY NUCLEIC ACID (DNA OR RNA); SEVERE ACUTE RESPIRATORY SYNDROME CORONAVIRUS 2 (SARS-COV-2) (CORONAVIRUS DISEASE [COVID-19]), AMPLIFIED PROBE TECHNIQUE, MAKING USE OF HIGH THROUGHPUT TECHNOLOGIES AS DESCRIBED BY CMS-2020-01-R: HCPCS | Performed by: PHYSICIAN ASSISTANT

## 2020-11-29 PROCEDURE — G0382 LEV 3 HOSP TYPE B ED VISIT: HCPCS | Performed by: PHYSICIAN ASSISTANT

## 2020-12-01 LAB — SARS-COV-2 RNA SPEC QL NAA+PROBE: NOT DETECTED

## 2020-12-16 PROBLEM — Z12.5 PROSTATE CANCER SCREENING ENCOUNTER, OPTIONS AND RISKS DISCUSSED: Status: ACTIVE | Noted: 2020-12-16

## 2020-12-17 NOTE — PROGRESS NOTES
Daily Note     Today's date: 10/25/2018  Patient name: Mady Mohr  : 1950  MRN: [id number]  Referring provider: Patricia Rand MD  Dx:   Encounter Diagnosis     ICD-10-CM    1  S/P lateral meniscus repair of left knee Z98 890    2  S/P medial meniscectomy of left knee Z98 890        Start Time: 1635  Stop Time: 1720  Total time in clinic (min): 45 minutes    Subjective: Patient reports he feels really good today  He mentions that he did not do a lot of walking this week and has been resting  He states that the stiffness has drastically decreased as well  Objective: See treatment diary below      Assessment: Tolerated treatment well  Patient demonstrated fatigue post treatment, exhibited good technique with therapeutic exercises and would benefit from continued PT Patient reported minor increased symptoms during stool scoots and reports improvement in symptoms at the end of therapy  Tolerated good tolerance to ball toss on bosu ball  Plan: Continue per plan of care           Precautions: Standard     Daily Treatment Diary     Manual  9/25 10/4 10/11 10/18 10/25        Pat  Mobs   LB LB LB SK        Knee PROM   LB  LB LB SK        ITband stretch    LB LB SK                     Laser   14 narvaez 6 min                 Exercise Diary  9/25 10/4 10/11 10/18 10/25        Bike   10' 10' 7' 10'        Slant board stretch   3x30" 3x30" 3x30" 3x30"        Hamstring stretch   3x30"           Stool scoots   2 laps   2 laps 2 laps        Heel dips   2" 2x10 2" 10x   4" 10x          Wall squats   10x 10"  standig bosu balance w/ball toss standing bosu balance w/ ball toss        TKE   BTB  2x30 2x20  BTB          Steamboats    2x15 GTB           It band wall stretch    2x 1min          Monster walk     OTB 4 laps OTB  4 laps        sidesteps    2 laps                                                                                                                                   Modalities
None known

## 2021-02-09 ENCOUNTER — TRANSCRIBE ORDERS (OUTPATIENT)
Dept: ADMINISTRATIVE | Facility: HOSPITAL | Age: 71
End: 2021-02-09

## 2021-02-09 DIAGNOSIS — C85.99 NON-HODGKIN LYMPHOMA, UNSPECIFIED, EXTRANODAL AND SOLID ORGAN SITES (HCC): ICD-10-CM

## 2021-02-09 DIAGNOSIS — C81.95: Primary | ICD-10-CM

## 2021-02-10 ENCOUNTER — TELEPHONE (OUTPATIENT)
Dept: UROLOGY | Facility: CLINIC | Age: 71
End: 2021-02-10

## 2021-02-10 NOTE — TELEPHONE ENCOUNTER
Please advise patient his yearly follow up on 3/23/21 has changed to 4/6/21 at 1015am due to MD availability

## 2021-02-13 DIAGNOSIS — Z23 ENCOUNTER FOR IMMUNIZATION: ICD-10-CM

## 2021-02-15 ENCOUNTER — IMMUNIZATIONS (OUTPATIENT)
Dept: FAMILY MEDICINE CLINIC | Facility: HOSPITAL | Age: 71
End: 2021-02-15

## 2021-02-15 DIAGNOSIS — Z23 ENCOUNTER FOR IMMUNIZATION: Primary | ICD-10-CM

## 2021-02-15 PROCEDURE — 91300 SARS-COV-2 / COVID-19 MRNA VACCINE (PFIZER-BIONTECH) 30 MCG: CPT

## 2021-02-15 PROCEDURE — 0001A SARS-COV-2 / COVID-19 MRNA VACCINE (PFIZER-BIONTECH) 30 MCG: CPT

## 2021-02-16 ENCOUNTER — TRANSCRIBE ORDERS (OUTPATIENT)
Dept: ADMINISTRATIVE | Facility: HOSPITAL | Age: 71
End: 2021-02-16

## 2021-02-16 DIAGNOSIS — C85.99 NON-HODGKIN LYMPHOMA, UNSPECIFIED, EXTRANODAL AND SOLID ORGAN SITES (HCC): ICD-10-CM

## 2021-02-16 DIAGNOSIS — C81.95: Primary | ICD-10-CM

## 2021-03-08 ENCOUNTER — IMMUNIZATIONS (OUTPATIENT)
Dept: FAMILY MEDICINE CLINIC | Facility: HOSPITAL | Age: 71
End: 2021-03-08

## 2021-03-08 DIAGNOSIS — Z23 ENCOUNTER FOR IMMUNIZATION: Primary | ICD-10-CM

## 2021-03-08 PROCEDURE — 0002A SARS-COV-2 / COVID-19 MRNA VACCINE (PFIZER-BIONTECH) 30 MCG: CPT

## 2021-03-08 PROCEDURE — 91300 SARS-COV-2 / COVID-19 MRNA VACCINE (PFIZER-BIONTECH) 30 MCG: CPT

## 2021-03-16 ENCOUNTER — TELEPHONE (OUTPATIENT)
Dept: UROLOGY | Facility: CLINIC | Age: 71
End: 2021-03-16

## 2021-03-19 ENCOUNTER — TELEPHONE (OUTPATIENT)
Dept: RADIOLOGY | Facility: HOSPITAL | Age: 71
End: 2021-03-19

## 2021-03-19 NOTE — TELEPHONE ENCOUNTER
Spoke with patient he states he hasn't had any labs done , will call office to get stat lab order faxed over so pt can get labs over the weekend aj

## 2021-03-19 NOTE — TELEPHONE ENCOUNTER
Spoke with Petty MENDES   She let me know patient had labs done in office on 1/28/21  B:25 C:0 86 & Gfr:91    She is also faxing over patients ct script aj

## 2021-03-19 NOTE — TELEPHONE ENCOUNTER
Spoke with Ester she states she sent over my urgent message re patient needed labs for his ct Monday, she states if I don't receive a call back within 15 minutes that I should call again

## 2021-03-22 ENCOUNTER — HOSPITAL ENCOUNTER (OUTPATIENT)
Dept: RADIOLOGY | Facility: HOSPITAL | Age: 71
Discharge: HOME | End: 2021-03-22
Payer: COMMERCIAL

## 2021-03-22 DIAGNOSIS — C85.99 NON-HODGKIN LYMPHOMA, UNSPECIFIED, EXTRANODAL AND SOLID ORGAN SITES (HCC): ICD-10-CM

## 2021-03-22 DIAGNOSIS — C81.95: ICD-10-CM

## 2021-03-22 PROCEDURE — G1004 CDSM NDSC: HCPCS

## 2021-03-22 PROCEDURE — 74177 CT ABD & PELVIS W/CONTRAST: CPT

## 2021-03-22 PROCEDURE — 71260 CT THORAX DX C+: CPT

## 2021-03-22 RX ADMIN — IOHEXOL 100 ML: 350 INJECTION, SOLUTION INTRAVENOUS at 08:28

## 2021-03-23 ENCOUNTER — TELEPHONE (OUTPATIENT)
Dept: INTERVENTIONAL RADIOLOGY/VASCULAR | Facility: HOSPITAL | Age: 71
End: 2021-03-23

## 2021-03-24 ENCOUNTER — TELEPHONE (OUTPATIENT)
Dept: INTERVENTIONAL RADIOLOGY/VASCULAR | Facility: HOSPITAL | Age: 71
End: 2021-03-24

## 2021-03-24 NOTE — NURSING NOTE
Follow up post contrast extravasation completed by Radiology SHARLENE Mckeon  Pt denied any worsening symptoms, information given to follow up if necessary

## 2021-04-07 ENCOUNTER — APPOINTMENT (OUTPATIENT)
Dept: LAB | Facility: CLINIC | Age: 71
End: 2021-04-07
Payer: COMMERCIAL

## 2021-04-07 LAB — PSA SERPL-MCNC: 1.3 NG/ML (ref 0–4)

## 2021-04-07 PROCEDURE — 84153 ASSAY OF PSA TOTAL: CPT

## 2021-04-08 ENCOUNTER — OFFICE VISIT (OUTPATIENT)
Dept: UROLOGY | Facility: CLINIC | Age: 71
End: 2021-04-08
Payer: COMMERCIAL

## 2021-04-08 VITALS
HEART RATE: 68 BPM | HEIGHT: 64 IN | BODY MASS INDEX: 29.37 KG/M2 | DIASTOLIC BLOOD PRESSURE: 70 MMHG | WEIGHT: 172 LBS | SYSTOLIC BLOOD PRESSURE: 142 MMHG

## 2021-04-08 DIAGNOSIS — N13.8 BPH WITH OBSTRUCTION/LOWER URINARY TRACT SYMPTOMS: Primary | ICD-10-CM

## 2021-04-08 DIAGNOSIS — N40.1 BPH WITH OBSTRUCTION/LOWER URINARY TRACT SYMPTOMS: Primary | ICD-10-CM

## 2021-04-08 PROCEDURE — 99213 OFFICE O/P EST LOW 20 MIN: CPT | Performed by: PHYSICIAN ASSISTANT

## 2021-04-08 PROCEDURE — 3008F BODY MASS INDEX DOCD: CPT | Performed by: PHYSICIAN ASSISTANT

## 2021-04-08 PROCEDURE — 1036F TOBACCO NON-USER: CPT | Performed by: PHYSICIAN ASSISTANT

## 2021-04-08 PROCEDURE — 1160F RVW MEDS BY RX/DR IN RCRD: CPT | Performed by: PHYSICIAN ASSISTANT

## 2021-04-08 NOTE — PROGRESS NOTES
UROLOGY PROGRESS NOTE   Patient Identifiers: Brit De Los Santos (MRN [id number])  Date of Service: 4/8/2021    Subjective:      81-year-old man history of BPH and nephrolithiasis  PSA 1 3  He has not had any recent kidney stones  Current CT scan is unremarkable  He has no significant outlet complaints  He does have a history of non-Hodgkin's lymphoma without evidence of recurrence        Reason for visit:  BPH and kidney stone follow-up      Objective:     VITALS:    Vitals:    04/08/21 1420   BP: 142/70   Pulse: 68           LABS:  Lab Results   Component Value Date    HGB 16 1 06/24/2020    HCT 46 8 06/24/2020    WBC 4 81 06/24/2020     06/24/2020   ]    Lab Results   Component Value Date    K 4 4 06/24/2020     06/24/2020    CO2 28 06/24/2020    BUN 22 06/24/2020    CREATININE 0 92 06/24/2020    CALCIUM 9 3 06/24/2020   ]        INPATIENT MEDS:    Current Outpatient Medications:     acetaminophen (TYLENOL) 500 mg tablet, Take 500 mg by mouth every 6 (six) hours as needed for mild pain, Disp: , Rfl:     ALPRAZolam (XANAX) 0 5 mg tablet, Take 0 5 mg by mouth daily, Disp: , Rfl: 0    aspirin 325 mg tablet, Take 1 tablet by mouth daily, Disp: , Rfl:     cetirizine (ZYRTEC ALLERGY) 10 mg tablet, Take 1 tablet by mouth daily, Disp: , Rfl:     co-enzyme Q-10 30 MG capsule, Take 30 mg by mouth 3 (three) times a day, Disp: , Rfl:     levothyroxine 175 mcg tablet, Take 1 tablet by mouth daily, Disp: , Rfl:     Magnesium 200 MG TABS, Take by mouth, Disp: , Rfl:     melatonin 3 mg, Take by mouth, Disp: , Rfl:     metoprolol tartrate (LOPRESSOR) 25 mg tablet, Take 25 mg by mouth 2 (two) times a day, Disp: , Rfl: 0    Multiple Vitamin tablet, Take 1 tablet by mouth daily, Disp: , Rfl:     pantoprazole (PROTONIX) 40 mg tablet, Take 1 tablet by mouth 2 (two) times a day  , Disp: , Rfl:     Potassium 99 MG TABS, Take by mouth, Disp: , Rfl:     simvastatin (ZOCOR) 40 mg tablet, Take 1 tablet by mouth daily, Disp: , Rfl:     amLODIPine (NORVASC) 5 mg tablet, Take 5 mg by mouth daily  , Disp: , Rfl:     gabapentin (NEURONTIN) 300 mg capsule, Take 300 mg by mouth 3 (three) times a day, Disp: , Rfl:       Physical Exam:   /70 (BP Location: Left arm, Patient Position: Sitting, Cuff Size: Adult)   Pulse 68   Ht 5' 4" (1 626 m)   Wt 78 kg (172 lb)   BMI 29 52 kg/m²   GEN: no acute distress    RESP: breathing comfortably with no accessory muscle use    ABD: soft, non-tender, non-distended   INCISION:    EXT: no significant peripheral edema   (Male): Penis circumcised, phallus normal, meatus patent  Testicles descended into scrotum bilaterally without masses nor tenderness  No inguinal hernias bilaterally  BETTYE: Prostate is enlarged at 35 grams  The prostate is not boggy  The prostate is not tender  No nodules noted      RADIOLOGY:   CT CHEST, ABDOMEN AND PELVIS WITH IV CONTRAST   IMPRESSION:     No evidence of recurrent lymphoma  Pulmonary emphysema  Colonic diverticulosis        Assessment:    1  BPH   2   History kidney stones     Plan:   - follow-up in 1 year with PSA prior to visit  -  -  -

## 2021-04-13 ENCOUNTER — HOSPITAL ENCOUNTER (OUTPATIENT)
Dept: NON INVASIVE DIAGNOSTICS | Age: 71
Discharge: HOME | End: 2021-04-13
Payer: COMMERCIAL

## 2021-04-13 DIAGNOSIS — E78.00 PURE HYPERCHOLESTEROLEMIA: ICD-10-CM

## 2021-04-13 DIAGNOSIS — I10 ESSENTIAL (PRIMARY) HYPERTENSION: ICD-10-CM

## 2021-04-13 DIAGNOSIS — I25.10 CORONARY ARTERIOSCLEROSIS: ICD-10-CM

## 2021-04-13 PROCEDURE — 93306 TTE W/DOPPLER COMPLETE: CPT

## 2021-04-13 PROCEDURE — 93306 TTE W/DOPPLER COMPLETE: CPT | Performed by: INTERNAL MEDICINE

## 2021-04-26 ENCOUNTER — OFFICE VISIT (OUTPATIENT)
Dept: PHYSICAL THERAPY | Facility: REHABILITATION | Age: 71
End: 2021-04-26
Payer: COMMERCIAL

## 2021-04-26 DIAGNOSIS — M54.2 ACUTE NECK PAIN: Primary | ICD-10-CM

## 2021-04-26 PROCEDURE — 97162 PT EVAL MOD COMPLEX 30 MIN: CPT | Performed by: PHYSICAL THERAPIST

## 2021-04-26 PROCEDURE — 97140 MANUAL THERAPY 1/> REGIONS: CPT | Performed by: PHYSICAL THERAPIST

## 2021-04-29 ENCOUNTER — OFFICE VISIT (OUTPATIENT)
Dept: PHYSICAL THERAPY | Facility: REHABILITATION | Age: 71
End: 2021-04-29
Payer: COMMERCIAL

## 2021-04-29 DIAGNOSIS — M54.2 ACUTE NECK PAIN: Primary | ICD-10-CM

## 2021-04-29 PROCEDURE — 97140 MANUAL THERAPY 1/> REGIONS: CPT | Performed by: PHYSICAL THERAPIST

## 2021-04-29 NOTE — PROGRESS NOTES
Daily Note     Today's date: 2021  Patient name: Liza Garcia  : 1950  MRN: [id number]  Referring provider: Thelma Mederos, PT  Dx:   Encounter Diagnosis     ICD-10-CM    1  Acute neck pain  M54 2        Start Time: 915  Stop Time: 1000  Total time in clinic (min): 45 minutes    Subjective: Pt feels the current HEP helps, but it hurts when he does it  Objective: See treatment diary below  Video of patient rowing observed to assess form  Pt encouraged to use his upper back at the end of the pull to decrease strain on his neck  Quality of movement improved after manual PT  Pt able to perform a scap retraction without elevating the right shoulder girdle  Assessment: Tolerated treatment well  Patient would benefit from continued PT      Plan: Continue per plan of care  Add upper back strengthening        Precautions: HTN    Manuals            Tissue deformation - subscapularis LMR            Tissue deformation - pec minor LMR            Tissue deformation - UT LMR MFD - LMR           Supine tissue deformation - scalenes  LMR           Supine sustained pressure - tissue  - sub-occipital region  LMR           Upper cx traction  LMR           CTJ mobs  prn           Mid thoracic mobs  prn                        Neuro Re-Ed                                                                                                        Ther Ex            Low trap retractions 5"x15 5" - 2x5           HEP LMR LMR           Self release - scalenes verbal            Rowing form   verbal           Face pulls  nv           horiz abd - XS  nv                                     Ther Activity                                       Gait Training                                       Modalities

## 2021-05-04 ENCOUNTER — OFFICE VISIT (OUTPATIENT)
Dept: PHYSICAL THERAPY | Facility: REHABILITATION | Age: 71
End: 2021-05-04
Payer: COMMERCIAL

## 2021-05-04 DIAGNOSIS — M54.2 ACUTE NECK PAIN: Primary | ICD-10-CM

## 2021-05-04 PROCEDURE — 97110 THERAPEUTIC EXERCISES: CPT | Performed by: PHYSICAL THERAPIST

## 2021-05-04 PROCEDURE — 97140 MANUAL THERAPY 1/> REGIONS: CPT | Performed by: PHYSICAL THERAPIST

## 2021-05-04 NOTE — PROGRESS NOTES
Daily Note     Today's date: 2021  Patient name: Raymond Holly  : 1950  MRN: [id number]  Referring provider: Gabriel Morris PT  Dx:   Encounter Diagnosis     ICD-10-CM    1  Acute neck pain  M54 2        Start Time: 1608  Stop Time: 06  Total time in clinic (min): 43 minutes    Subjective: Pt reports his neck is feeling better  Flew on a plane to [place] and states the neck is a little stiff from flying and sleeping in a bed that is not his  Objective: See treatment diary below  C0-1 joint mobility WNL  C1-2 WNL  C2-4 decreased PA mobility      Assessment: Favorable response to manuals with reduction in subjective stiffness and palpable hypertonicity  Pt required verbal and tactile cues 50% of the time to prevent R shoulder elevation at end range of face pulls and horiz abd  Plan: Continue per plan of care         Precautions: HTN    Manuals           Tissue deformation - subscapularis LMR            Tissue deformation - pec minor LMR            Tissue deformation - UT LMR MFD - LMR DS RUE supine and sitting          Supine tissue deformation - scalenes  LMR           Supine sustained pressure - tissue  - sub-occipital region  LMR DS          Upper cx traction  LMR DS          CTJ mobs  prn           Mid thoracic mobs  prn           PA mobs C2-4   DS Gr II, IV          Assess   DS          Neuro Re-Ed                                                                                                        Ther Ex           Low trap retractions 5"x15 5" - 2x5           HEP LMR LMR DS          Self release - scalenes verbal            Rowing form   verbal           Face pulls  nv 4# 3x8          horiz abd - XS  nv 3# 3x8                                    Ther Activity                                       Gait Training                                       Modalities

## 2021-05-10 ENCOUNTER — OFFICE VISIT (OUTPATIENT)
Dept: PHYSICAL THERAPY | Facility: REHABILITATION | Age: 71
End: 2021-05-10
Payer: COMMERCIAL

## 2021-05-10 DIAGNOSIS — M54.2 ACUTE NECK PAIN: Primary | ICD-10-CM

## 2021-05-10 PROCEDURE — 97140 MANUAL THERAPY 1/> REGIONS: CPT | Performed by: PHYSICAL THERAPIST

## 2021-05-10 PROCEDURE — 97110 THERAPEUTIC EXERCISES: CPT | Performed by: PHYSICAL THERAPIST

## 2021-05-10 NOTE — PROGRESS NOTES
Daily Note     Today's date: 5/10/2021  Patient name: Livan Palomares  : 1950  MRN: [id number]  Referring provider: Margie Smith PT  Dx:   Encounter Diagnosis     ICD-10-CM    1  Acute neck pain  M54 2        Start Time: 815  Stop Time: 0900  Total time in clinic (min): 45 minutes    Subjective: pt modified his technique when using the rower at home  He denies any neck pain with the new technique  Overall, he feels less tension in his neck/shoulder since starting PT  The decreased sx's last for a few days after a PT session  Objective: See treatment diary below  cx rotation arom:  Right = limited  HEP updated with high rows, CTJ extension mobs, and right cx rotation arom  Assessment: Tolerated treatment well  Patient would benefit from continued PT      Plan: Continue per plan of care        Precautions: HTN    Manuals 04/26 04/29 5/4 05/10         Tissue deformation - subscapularis LMR            Tissue deformation - pec minor LMR            Tissue deformation - UT LMR MFD - LMR DS RUE supine and sitting LMR - seated         Supine tissue deformation - scalenes  LMR           Gr 4 mobs to improve right CTJ sidebend    LMR                                                Supine sustained pressure - tissue  - sub-occipital region  LMR DS                                    Seated R shoulder PROM    LMR         Seated rib 2 inf mobs    Gr 4 - LMR         Right cx rotation NAG's    LMR         Upper cx traction  LMR DS          CTJ mobs  prn  Ext - LMR         Mid thoracic mobs  prn           PA mobs C2-4   DS Gr II, IV          Assess   DS          Neuro Re-Ed                                                                                                        Ther Ex  0510         Low trap retractions 5"x15 5" - 2x5           HEP LMR LMR DS LMR         Self release - scalenes verbal            Rowing form   verbal           Face pulls  nv 4# 3x8          horiz abd - XS nv 3# 3x8 3# - 2xF         keily - high row    11# - 2xF         Upper trunk left rotation/right cx rotation    2x5         CTJ ext self mobs    2x5                                                Ther Activity                                       Gait Training                                       Modalities

## 2021-05-13 ENCOUNTER — OFFICE VISIT (OUTPATIENT)
Dept: PHYSICAL THERAPY | Facility: REHABILITATION | Age: 71
End: 2021-05-13
Payer: COMMERCIAL

## 2021-05-13 DIAGNOSIS — M54.2 ACUTE NECK PAIN: Primary | ICD-10-CM

## 2021-05-13 PROCEDURE — 97140 MANUAL THERAPY 1/> REGIONS: CPT | Performed by: PHYSICAL THERAPIST

## 2021-05-13 PROCEDURE — 97110 THERAPEUTIC EXERCISES: CPT | Performed by: PHYSICAL THERAPIST

## 2021-05-13 PROCEDURE — 97112 NEUROMUSCULAR REEDUCATION: CPT | Performed by: PHYSICAL THERAPIST

## 2021-05-13 NOTE — PROGRESS NOTES
Daily Note     Today's date: 2021  Patient name: Komal Moore  : 1950  MRN: [id number]  Referring provider: Jeny Avery, PT  Dx:   Encounter Diagnosis     ICD-10-CM    1  Acute neck pain  M54 2        Start Time: 1200  Stop Time: 1240  Total time in clinic (min): 40 minutes    Subjective: Pt feels his sx's are much improved than when he started PT  He states he is pain-free for a few days after each PT session  Also, his mechanics are improved when using his rower machine  Objective: See treatment diary below  HEP updated with OH banded pull-a-parts and horiz abd  Orange band issued  Pt also instructed in long coli activation with head on ball on wall  Assessment: Tolerated treatment well  Patient would benefit from continued PT      Plan: Continue per plan of care        Precautions: HTN    Manuals 04/26 04/29 5/4 05/10 05/13        Tissue deformation - subscapularis LMR            Tissue deformation - pec minor LMR            Tissue deformation - UT LMR MFD - LMR DS RUE supine and sitting LMR - seated         Supine tissue deformation - scalenes  LMR           Gr 4 mobs to improve right CTJ sidebend    LMR LMR - seated        Supine C0-1 distraction     LMR - gr 4 + 5        Supine SOR     LMR                     Supine sustained pressure - tissue  - sub-occipital region  LMR DS                                    Seated R shoulder PROM    LMR         Seated rib 2 inf mobs    Gr 4 - LMR         Right cx rotation NAG's    LMR         Upper cx traction  LMR DS          CTJ mobs  prn  Ext - LMR         Mid thoracic mobs  prn           PA mobs C2-4   DS Gr II, IV          Assess   DS          Neuro Re-Ed             Long coli - head on ball on wall     2'x2        OH pull-a-parts     orange - 2xF        horiz abd pull-a-parts     orange - 2xF                                                            Ther Ex 04/26 04/29 5/4 05/10 05/13        Low trap retractions 5"x15 5" - 2x5 HEP LMR LMR DS LMR LMR        Self release - scalenes verbal            Rowing form   verbal           Face pulls  nv 4# 3x8  keily - 11# - 5x3        horiz abd - XS  nv 3# 3x8 3# - 2xF 3#x5, 7#-2x5        keily - high row    11# - 2xF         Upper trunk left rotation/right cx rotation    2x5         CTJ ext self mobs    2x5 hep        keily - face pulls     11# - 2x9                                  Ther Activity                                       Gait Training                                       Modalities

## 2021-05-17 ENCOUNTER — OFFICE VISIT (OUTPATIENT)
Dept: PHYSICAL THERAPY | Facility: REHABILITATION | Age: 71
End: 2021-05-17
Payer: COMMERCIAL

## 2021-05-17 DIAGNOSIS — M54.2 ACUTE NECK PAIN: Primary | ICD-10-CM

## 2021-05-17 PROCEDURE — 97110 THERAPEUTIC EXERCISES: CPT | Performed by: PHYSICAL THERAPIST

## 2021-05-17 PROCEDURE — 97140 MANUAL THERAPY 1/> REGIONS: CPT | Performed by: PHYSICAL THERAPIST

## 2021-05-17 NOTE — PROGRESS NOTES
Daily Note     Today's date: 2021  Patient name: Garcia Remy  : 1950  MRN: [id number]  Referring provider: Lex Mccartney, PT  Dx:   Encounter Diagnosis     ICD-10-CM    1  Acute neck pain  M54 2        Start Time: 1083  Stop Time: 5408  Total time in clinic (min): 40 minutes    Subjective: Pt went for an 8 mile bike ride yesterday and denies any increased neck sx's  He notes compliance with the current HEP  Objective: See treatment diary below  In general, increased muscle tightness in the jane-scap and neck region today  Assessment: Tolerated treatment well  Patient would benefit from continued PT      Plan: Continue per plan of care        Precautions: HTN    Manuals 04/26 04/29 5/4 05/10 05/13 05/17       Tissue deformation - subscapularis LMR            Tissue deformation - pec minor LMR            Tissue deformation - UT LMR MFD - LMR DS RUE supine and sitting LMR - seated         Supine tissue deformation - scalenes  LMR           Gr 4 mobs to improve right CTJ sidebend    LMR LMR - seated        Supine C0-1 distraction     LMR - gr 4 + 5        Supine SOR     LMR                     Supine sustained pressure - tissue  - sub-occipital region  LMR DS          Prone gr 4 b/l CTJ ext      LMR       Seated - sustained right UT with shoulder AROM      LMR       Seated R shoulder PROM    LMR         Seated rib 2 inf mobs    Gr 4 - LMR         Right cx rotation NAG's    LMR         Upper cx traction  LMR DS          CTJ mobs  prn  Ext - LMR  prone - gr 4 rot - LMR       Mid thoracic mobs  prn    LMR       PA mobs C2-4   DS Gr II, IV          Assess   DS          Neuro Re-Ed             Long coli - head on ball on wall     2'x2        OH pull-a-parts     orange - 2xF        horiz abd pull-a-parts     orange - 2xF                                                            Ther Ex 04/26 04/29 5/4 05/10 05/13        Low trap retractions 5"x15 5" - 2x5           HEP LMR LMR DS LMR LMR        Self release - scalenes verbal            Rowing form   verbal           Face pulls  nv 4# 3x8  keily - 11# - 5x3        horiz abd - XS  nv 3# 3x8 3# - 2xF 3#x5, 7#-2x5        keily - high row    11# - 2xF         Upper trunk left rotation/right cx rotation    2x5         CTJ ext self mobs    2x5 hep x5       keily - face pulls     11# - 2x9        Bent over banded pec stretch      30"x2                                 Face pulls - band      orange - 2x15       Banded pulldowns      orange - 2x15       Ther Activity                                       Gait Training                                       Modalities

## 2021-05-20 ENCOUNTER — OFFICE VISIT (OUTPATIENT)
Dept: PHYSICAL THERAPY | Facility: REHABILITATION | Age: 71
End: 2021-05-20
Payer: COMMERCIAL

## 2021-05-20 DIAGNOSIS — M54.2 ACUTE NECK PAIN: Primary | ICD-10-CM

## 2021-05-20 PROCEDURE — 97140 MANUAL THERAPY 1/> REGIONS: CPT | Performed by: PHYSICAL THERAPIST

## 2021-05-20 PROCEDURE — 97110 THERAPEUTIC EXERCISES: CPT | Performed by: PHYSICAL THERAPIST

## 2021-05-20 PROCEDURE — 97112 NEUROMUSCULAR REEDUCATION: CPT | Performed by: PHYSICAL THERAPIST

## 2021-05-20 NOTE — PROGRESS NOTES
Daily Note     Today's date: 2021  Patient name: Garcia Remy  : 1950  MRN: [id number]  Referring provider: Lex Mccartney, PT  Dx:   Encounter Diagnosis     ICD-10-CM    1  Acute neck pain  M54 2        Start Time: 1600  Stop Time: 1640  Total time in clinic (min): 40 minutes    Subjective: pt states he is feeling "pretty good"    Objective: See treatment diary below  TE focused on scap/trap disassociation  HEP  Updated with banded press outs and u/l row/rotation  Assessment: Tolerated treatment well  Patient would benefit from continued PT      Plan: Continue per plan of care        Precautions: HTN    Manuals 04/26 04/29 5/4 05/10 05/13 05/17 05/20      Tissue deformation - subscapularis LMR            Tissue deformation - pec minor LMR      LMR      Tissue deformation - UT LMR MFD - LMR DS RUE supine and sitting LMR - seated         Supine tissue deformation - scalenes  LMR           Gr 4 mobs to improve right CTJ sidebend    LMR LMR - seated        Supine C0-1 distraction     LMR - gr 4 + 5        Supine SOR     LMR                     Supine sustained pressure - tissue  - sub-occipital region  LMR DS          Prone gr 4 b/l CTJ ext      LMR       Seated - sustained right UT with shoulder AROM      LMR       Seated R shoulder PROM    LMR         Seated rib 2 inf mobs    Gr 4 - LMR   Gr 4 - LMR      Right cx rotation NAG's    LMR   LMR      Upper cx traction  LMR DS          CTJ mobs  prn  Ext - LMR  prone - gr 4 rot - LMR       Mid thoracic mobs  prn    LMR       PA mobs C2-4   DS Gr II, IV          Assess   DS          Neuro Re-Ed           Long coli - head on ball on wall     2'x2        OH pull-a-parts     orange - 2xF        horiz abd pull-a-parts     orange - 2xF        Banded press a parts       yellow - 5" - 2x5      keily - u/l row + rotation - stagger stance       5# - 2x9      Shoulder sphere - shoulder er       45"x3                   Ther Ex  05/10 05/13  05/20      Low trap retractions 5"x15 5" - 2x5           HEP LMR LMR DS LMR LMR  LMR      Self release - johnson verbal      ube - rev - 5'      Rowing form   verbal           Face pulls  nv 4# 3x8  keily - 11# - 5x3        horiz abd - XS  nv 3# 3x8 3# - 2xF 3#x5, 7#-2x5        keily - high row    11# - 2xF         Upper trunk left rotation/right cx rotation    2x5         CTJ ext self mobs    2x5 hep x5       keily - face pulls     11# - 2x9        Bent over banded pec stretch      30"x2       naut - scap pull ups (asst)       200# - xF                   Face pulls - band      orange - 2x15       Banded pulldowns      orange - 2x15       Ther Activity                                       Gait Training                                       Modalities

## 2021-05-24 ENCOUNTER — OFFICE VISIT (OUTPATIENT)
Dept: PHYSICAL THERAPY | Facility: REHABILITATION | Age: 71
End: 2021-05-24
Payer: COMMERCIAL

## 2021-05-24 DIAGNOSIS — M54.2 ACUTE NECK PAIN: Primary | ICD-10-CM

## 2021-05-24 PROCEDURE — 97140 MANUAL THERAPY 1/> REGIONS: CPT | Performed by: PHYSICAL THERAPIST

## 2021-05-24 PROCEDURE — 97110 THERAPEUTIC EXERCISES: CPT | Performed by: PHYSICAL THERAPIST

## 2021-05-24 PROCEDURE — 97012 MECHANICAL TRACTION THERAPY: CPT | Performed by: PHYSICAL THERAPIST

## 2021-05-24 NOTE — PROGRESS NOTES
Daily Note     Today's date: 2021  Patient name: Lencho Parks  : 1950  MRN: [id number]  Referring provider: Navjot Garrett PT  Dx:   Encounter Diagnosis     ICD-10-CM    1  Acute neck pain  M54 2        Start Time: 945  Stop Time:   Total time in clinic (min): 45 minutes    Subjective: Pt feels he is much better since the start of PT, but still feels tightness in the right side of his neck and shoulder especially when his head is forward flexed  Objective: See treatment diary below  Mechanical traction used  HEP updated with neck retractions + cx extension  Sx's centralized with several sets of repeated cx extension  Assessment: Tolerated treatment well  Patient would benefit from continued PT    Plan: Continue per plan of care        Precautions: HTN    Manuals 04/26 04/29 5/4 05/10 05/13 05/17 05/20 05/24     Tissue deformation - subscapularis LMR            Tissue deformation - pec minor LMR      LMR LMR     Tissue deformation - UT LMR MFD - LMR DS RUE supine and sitting LMR - seated         Supine tissue deformation - scalenes  LMR           Gr 4 mobs to improve right CTJ sidebend    LMR LMR - seated        Supine C0-1 distraction     LMR - gr 4 + 5        Supine SOR     LMR        Prone gr 4 CTJ rotation mobs        LMR     Supine sustained pressure - tissue  - sub-occipital region  LMR DS          Prone gr 4 b/l CTJ ext      LMR       Seated - sustained right UT with shoulder AROM      LMR       Seated R shoulder PROM    LMR         Seated rib 2 inf mobs    Gr 4 - LMR   Gr 4 - LMR Gr 4 - LMR     Right cx rotation NAG's    LMR   LMR      Upper cx traction  LMR DS          CTJ mobs  prn  Ext - LMR  prone - gr 4 rot - LMR       Mid thoracic mobs  prn    LMR       PA mobs C2-4   DS Gr II, IV          Assess   DS          Neuro Re-Ed          Long coli - head on ball on wall     2'x2        OH pull-a-parts     orange - 2xF        horiz abd pull-a-parts orange - 2xF        Banded press a parts       yellow - 5" - 2x5      keily - u/l row + rotation - stagger stance       5# - 2x9      Shoulder sphere - shoulder er       45"x3                   Ther Ex 04/26 04/29 5/4 05/10 05/13  05/20 05/24     Low trap retractions 5"x15 5" - 2x5           HEP LMR LMR DS LMR LMR  LMR LMR     Self release - scalenes verbal      ube - rev - 5'      Rowing form   verbal           Face pulls  nv 4# 3x8  keily - 11# - 5x3        horiz abd - XS  nv 3# 3x8 3# - 2xF 3#x5, 7#-2x5        keily - high row    11# - 2xF         Upper trunk left rotation/right cx rotation    2x5         CTJ ext self mobs    2x5 hep x5       keily - face pulls     11# - 2x9        Bent over banded pec stretch      30"x2       Neck retractions + cx ext        3x5                               naut - scap pull ups (asst)       200# - xF      Neck retractions        x5     Face pulls - band      orange - 2x15       Banded pulldowns      orange - 2x15       Ther Activity                                       Gait Training                                       Modalities        05/24     cx traction         8' - 10#

## 2021-05-27 ENCOUNTER — OFFICE VISIT (OUTPATIENT)
Dept: PHYSICAL THERAPY | Facility: REHABILITATION | Age: 71
End: 2021-05-27
Payer: COMMERCIAL

## 2021-05-27 DIAGNOSIS — M54.2 ACUTE NECK PAIN: Primary | ICD-10-CM

## 2021-05-27 PROCEDURE — 97110 THERAPEUTIC EXERCISES: CPT | Performed by: PHYSICAL THERAPIST

## 2021-05-27 PROCEDURE — 97012 MECHANICAL TRACTION THERAPY: CPT | Performed by: PHYSICAL THERAPIST

## 2021-05-27 NOTE — PROGRESS NOTES
Daily Note     Today's date: 2021  Patient name: Livan Palomares  : 1950  MRN: [id number]  Referring provider: Margie Smith PT  Dx:   Encounter Diagnosis     ICD-10-CM    1  Acute neck pain  M54 2        Start Time: 945  Stop Time: 102  Total time in clinic (min): 40 minutes    Subjective: Pt has not experienced any "pain" in the specified region since the last PT session  Objective: See treatment diary below  HEP updated with banded SA press and banded snow angels  Assessment: Tolerated treatment well  Patient would benefit from continued PT      Plan: f/u in 2 weeks    expected d/c at that time     Precautions: HTN    Manuals 04/26 04/29 5/4 05/10 05/13 05/17 05/20 05/24 05/27    Tissue deformation - subscapularis LMR            Tissue deformation - pec minor LMR      LMR LMR     Tissue deformation - UT LMR MFD - LMR DS RUE supine and sitting LMR - seated         Supine tissue deformation - scalenes  LMR           Gr 4 mobs to improve right CTJ sidebend    LMR LMR - seated        Supine C0-1 distraction     LMR - gr 4 + 5        Supine SOR     LMR        Prone gr 4 CTJ rotation mobs        LMR     Supine sustained pressure - tissue  - sub-occipital region  LMR DS          Prone gr 4 b/l CTJ ext      LMR       Seated - sustained right UT with shoulder AROM      LMR       Seated R shoulder PROM    LMR         Seated rib 2 inf mobs    Gr 4 - LMR   Gr 4 - LMR Gr 4 - LMR     Right cx rotation NAG's    LMR   LMR      Upper cx traction  LMR DS          CTJ mobs  prn  Ext - LMR  prone - gr 4 rot - LMR       Mid thoracic mobs  prn    LMR       PA mobs C2-4   DS Gr II, IV          Assess   DS          Neuro Re-Ed         Long coli - head on ball on wall     2'x2        OH pull-a-parts     orange - 2xF        horiz abd pull-a-parts     orange - 2xF        Banded press a parts       yellow - 5" - 2x5      keily - u/l row + rotation - stagger stance       5# - 2x9 Shoulder sphere - shoulder er       45"x3                   Ther Ex 04/26 04/29 5/4 05/10 05/13  05/20 05/24 05/27    Low trap retractions 5"x15 5" - 2x5           HEP LMR LMR DS LMR LMR  LMR LMR LMR    Self release - scalenes verbal      ube - rev - 5'      Rowing form   verbal           Face pulls  nv 4# 3x8  keily - 11# - 5x3        horiz abd - XS  nv 3# 3x8 3# - 2xF 3#x5, 7#-2x5        keily - high row    11# - 2xF         Upper trunk left rotation/right cx rotation    2x5         CTJ ext self mobs    2x5 hep x5       keily - face pulls     11# - 2x9        Bent over banded pec stretch      30"x2       Neck retractions + cx ext        3x5 hep    XS - snow angels         3# - 3x5    XS - SA press         7# - 3x9    naut - scap pull ups (asst)       200# - xF      Neck retractions        x5     Face pulls - band      orange - 2x15       Banded pulldowns      orange - 2x15       Ther Activity                                       Gait Training                                       Modalities        05/24 05/27    cx traction         8' - 10# 15# - 10'

## 2021-06-10 ENCOUNTER — OFFICE VISIT (OUTPATIENT)
Dept: PHYSICAL THERAPY | Facility: REHABILITATION | Age: 71
End: 2021-06-10
Payer: COMMERCIAL

## 2021-06-10 DIAGNOSIS — M54.2 ACUTE NECK PAIN: Primary | ICD-10-CM

## 2021-06-10 PROCEDURE — 97110 THERAPEUTIC EXERCISES: CPT | Performed by: PHYSICAL THERAPIST

## 2021-06-10 PROCEDURE — 97112 NEUROMUSCULAR REEDUCATION: CPT | Performed by: PHYSICAL THERAPIST

## 2021-06-10 PROCEDURE — 97140 MANUAL THERAPY 1/> REGIONS: CPT | Performed by: PHYSICAL THERAPIST

## 2021-06-10 NOTE — PROGRESS NOTES
Daily Note     Today's date: 6/10/2021  Patient name: Ty Plunkett  : 1950  MRN: [id number]  Referring provider: Isaiah Cedillo PT  Dx:   Encounter Diagnosis     ICD-10-CM    1  Acute neck pain  M54 2        Start Time: 1700  Stop Time: 1730  Total time in clinic (min): 30 minutes    Subjective: Pt denies pain currently  He notes 90% improvement  Objective: See treatment diary below  HEP updated with standing pec stretch and seated thoracic extension  Goals  ST - I with HEP - MET  2 - static scap < 3 5 inches from spinous process - MET  LT - FOTO > 67 - MET  2 - turn his head to look behind him while driving a car without pain or limitations - MET  3 - perform yard work without limitation - UNKNOWN  4 - sit and read for > 25 minutes with < 1/10 neck pain - MET  5 - return to rowing without issues - MET    FOTO has increased from 49 to 87 indicating improvement  Classical cx arom:  Pt denies neck pain in all planes  Assessment: Tolerated treatment well   Patient exhibited good technique with therapeutic exercises      Plan: d/c to ongoing hep     Precautions: HTN    Manuals 04/26 04/29 5/4 05/10 05/13 05/17 05/20 05/24 05/27 06/10   Tissue deformation - subscapularis LMR            Tissue deformation - pec minor LMR      LMR LMR     Tissue deformation - UT LMR MFD - LMR DS RUE supine and sitting LMR - seated         Supine tissue deformation - scalenes  LMR           Gr 4 mobs to improve right CTJ sidebend    LMR LMR - seated        Supine C0-1 distraction     LMR - gr 4 + 5        Supine SOR     LMR        Prone gr 4 CTJ rotation mobs        LMR  P-A - LMR   Supine sustained pressure - tissue  - sub-occipital region  LMR DS          Prone gr 4 b/l CTJ ext      LMR       Seated - sustained right UT with shoulder AROM      LMR       Seated R shoulder PROM    LMR      LMR   Seated rib 2 inf mobs    Gr 4 - LMR   Gr 4 - LMR Gr 4 - LMR     Right cx rotation NAG's    LMR   LMR Upper cx traction  LMR DS          CTJ mobs  prn  Ext - LMR  prone - gr 4 rot - LMR       Mid thoracic mobs  prn    LMR    gr 4 - P/A - LMR   PA mobs C2-4   DS Gr II, IV          Assess   DS       LMR   Neuro Re-Ed     05/13  05/20 05/24 05/27 06/10   Long coli - head on ball on wall     2'x2        OH pull-a-parts     orange - 2xF        horiz abd pull-a-parts     orange - 2xF        Banded press a parts       yellow - 5" - 2x5      keily - u/l row + rotation - stagger stance       5# - 2x9      Prone shoulder sweeps          x5   Shoulder sphere - shoulder er       45"x3      Stand - scap retract + BTB lift off          15"x3   Ther Ex 04/26 04/29 5/4 05/10 05/13  05/20 05/24 05/27 06/10   Low trap retractions 5"x15 5" - 2x5           HEP LMR LMR DS LMR LMR  LMR LMR LMR LMR   Self release - scalenes verbal      ube - rev - 5'      Rowing form   verbal           Face pulls  nv 4# 3x8  keily - 11# - 5x3        horiz abd - XS  nv 3# 3x8 3# - 2xF 3#x5, 7#-2x5        keily - high row    11# - 2xF         Upper trunk left rotation/right cx rotation    2x5         CTJ ext self mobs    2x5 hep x5       keily - face pulls     11# - 2x9        Bent over banded pec stretch      30"x2       Neck retractions + cx ext        3x5 hep    XS - snow angels         3# - 3x5    XS - SA press         7# - 3x9    Seated thoracic ext over chair          2x15                             naut - scap pull ups (asst)       200# - xF      Neck retractions        x5     Face pulls - band      orange - 2x15       Banded pulldowns      orange - 2x15       Ther Activity                                       Gait Training                                       Modalities        05/24 05/27    cx traction         8' - 10# 15# - 10'

## 2021-11-06 ENCOUNTER — IMMUNIZATIONS (OUTPATIENT)
Dept: FAMILY MEDICINE CLINIC | Facility: HOSPITAL | Age: 71
End: 2021-11-06

## 2021-11-06 DIAGNOSIS — Z23 ENCOUNTER FOR IMMUNIZATION: Primary | ICD-10-CM

## 2021-11-06 PROCEDURE — 0001A COVID-19 PFIZER VACC 0.3 ML: CPT

## 2021-11-06 PROCEDURE — 91300 COVID-19 PFIZER VACC 0.3 ML: CPT

## 2021-11-17 ENCOUNTER — OFFICE VISIT (OUTPATIENT)
Dept: CARDIOLOGY CLINIC | Facility: CLINIC | Age: 71
End: 2021-11-17
Payer: COMMERCIAL

## 2021-11-17 VITALS
BODY MASS INDEX: 29.53 KG/M2 | SYSTOLIC BLOOD PRESSURE: 114 MMHG | DIASTOLIC BLOOD PRESSURE: 72 MMHG | WEIGHT: 173 LBS | HEIGHT: 64 IN | HEART RATE: 72 BPM

## 2021-11-17 DIAGNOSIS — I25.10 CORONARY ARTERIOSCLEROSIS: ICD-10-CM

## 2021-11-17 DIAGNOSIS — E78.00 PURE HYPERCHOLESTEROLEMIA: ICD-10-CM

## 2021-11-17 DIAGNOSIS — I10 ESSENTIAL (PRIMARY) HYPERTENSION: Primary | ICD-10-CM

## 2021-11-17 PROCEDURE — 93000 ELECTROCARDIOGRAM COMPLETE: CPT | Performed by: INTERNAL MEDICINE

## 2021-11-17 PROCEDURE — 99214 OFFICE O/P EST MOD 30 MIN: CPT | Performed by: INTERNAL MEDICINE

## 2021-11-29 ENCOUNTER — OFFICE VISIT (OUTPATIENT)
Dept: PHYSICAL THERAPY | Facility: REHABILITATION | Age: 71
End: 2021-11-29
Payer: COMMERCIAL

## 2021-11-29 DIAGNOSIS — M25.511 ACUTE PAIN OF RIGHT SHOULDER: Primary | ICD-10-CM

## 2021-11-29 PROCEDURE — 97140 MANUAL THERAPY 1/> REGIONS: CPT | Performed by: PHYSICAL THERAPIST

## 2021-11-29 PROCEDURE — 97162 PT EVAL MOD COMPLEX 30 MIN: CPT | Performed by: PHYSICAL THERAPIST

## 2021-12-02 ENCOUNTER — OFFICE VISIT (OUTPATIENT)
Dept: PHYSICAL THERAPY | Facility: REHABILITATION | Age: 71
End: 2021-12-02
Payer: COMMERCIAL

## 2021-12-02 DIAGNOSIS — M25.511 ACUTE PAIN OF RIGHT SHOULDER: Primary | ICD-10-CM

## 2021-12-02 PROCEDURE — 97110 THERAPEUTIC EXERCISES: CPT | Performed by: PHYSICAL THERAPIST

## 2021-12-02 PROCEDURE — 97140 MANUAL THERAPY 1/> REGIONS: CPT | Performed by: PHYSICAL THERAPIST

## 2021-12-07 ENCOUNTER — OFFICE VISIT (OUTPATIENT)
Dept: PHYSICAL THERAPY | Facility: REHABILITATION | Age: 71
End: 2021-12-07
Payer: COMMERCIAL

## 2021-12-07 DIAGNOSIS — M25.511 ACUTE PAIN OF RIGHT SHOULDER: Primary | ICD-10-CM

## 2021-12-07 PROCEDURE — 97110 THERAPEUTIC EXERCISES: CPT | Performed by: PHYSICAL THERAPIST

## 2021-12-07 PROCEDURE — 97140 MANUAL THERAPY 1/> REGIONS: CPT | Performed by: PHYSICAL THERAPIST

## 2021-12-16 ENCOUNTER — OFFICE VISIT (OUTPATIENT)
Dept: PHYSICAL THERAPY | Facility: REHABILITATION | Age: 71
End: 2021-12-16
Payer: COMMERCIAL

## 2021-12-16 DIAGNOSIS — M25.511 ACUTE PAIN OF RIGHT SHOULDER: Primary | ICD-10-CM

## 2021-12-16 PROCEDURE — 97140 MANUAL THERAPY 1/> REGIONS: CPT | Performed by: PHYSICAL THERAPIST

## 2021-12-21 ENCOUNTER — OFFICE VISIT (OUTPATIENT)
Dept: PHYSICAL THERAPY | Facility: REHABILITATION | Age: 71
End: 2021-12-21
Payer: COMMERCIAL

## 2021-12-21 DIAGNOSIS — M25.511 ACUTE PAIN OF RIGHT SHOULDER: Primary | ICD-10-CM

## 2021-12-21 PROCEDURE — 97140 MANUAL THERAPY 1/> REGIONS: CPT | Performed by: PHYSICAL THERAPIST

## 2021-12-21 PROCEDURE — 97110 THERAPEUTIC EXERCISES: CPT | Performed by: PHYSICAL THERAPIST

## 2022-02-01 ENCOUNTER — TRANSCRIBE ORDERS (OUTPATIENT)
Dept: ADMINISTRATIVE | Facility: HOSPITAL | Age: 72
End: 2022-02-01

## 2022-02-01 DIAGNOSIS — C85.99 ORBITAL LYMPHOMA (HCC): ICD-10-CM

## 2022-02-01 DIAGNOSIS — C85.90 LYMPHOMA, UNSPECIFIED BODY REGION, UNSPECIFIED LYMPHOMA TYPE (HCC): Primary | ICD-10-CM

## 2022-02-02 ENCOUNTER — APPOINTMENT (OUTPATIENT)
Dept: LAB | Facility: CLINIC | Age: 72
End: 2022-02-02
Payer: COMMERCIAL

## 2022-02-02 DIAGNOSIS — C85.90 LYMPHOMA, UNSPECIFIED BODY REGION, UNSPECIFIED LYMPHOMA TYPE (HCC): ICD-10-CM

## 2022-02-02 DIAGNOSIS — C85.99 ORBITAL LYMPHOMA (HCC): ICD-10-CM

## 2022-02-02 LAB
ALBUMIN SERPL BCP-MCNC: 4 G/DL (ref 3.5–5)
ALP SERPL-CCNC: 64 U/L (ref 46–116)
ALT SERPL W P-5'-P-CCNC: 34 U/L (ref 12–78)
ANION GAP SERPL CALCULATED.3IONS-SCNC: 2 MMOL/L (ref 4–13)
AST SERPL W P-5'-P-CCNC: 23 U/L (ref 5–45)
BILIRUB SERPL-MCNC: 1.24 MG/DL (ref 0.2–1)
BUN SERPL-MCNC: 26 MG/DL (ref 5–25)
CALCIUM SERPL-MCNC: 9.2 MG/DL (ref 8.3–10.1)
CHLORIDE SERPL-SCNC: 105 MMOL/L (ref 100–108)
CO2 SERPL-SCNC: 31 MMOL/L (ref 21–32)
CREAT SERPL-MCNC: 0.98 MG/DL (ref 0.6–1.3)
ERYTHROCYTE [DISTWIDTH] IN BLOOD BY AUTOMATED COUNT: 13 % (ref 11.6–15.1)
GFR SERPL CREATININE-BSD FRML MDRD: 77 ML/MIN/1.73SQ M
GLUCOSE P FAST SERPL-MCNC: 100 MG/DL (ref 65–99)
HCT VFR BLD AUTO: 48.4 % (ref 36.5–49.3)
HGB BLD-MCNC: 16.3 G/DL (ref 12–17)
MCH RBC QN AUTO: 32.1 PG (ref 26.8–34.3)
MCHC RBC AUTO-ENTMCNC: 33.7 G/DL (ref 31.4–37.4)
MCV RBC AUTO: 95 FL (ref 82–98)
PLATELET # BLD AUTO: 200 THOUSANDS/UL (ref 149–390)
PMV BLD AUTO: 10 FL (ref 8.9–12.7)
POTASSIUM SERPL-SCNC: 4.2 MMOL/L (ref 3.5–5.3)
PROT SERPL-MCNC: 7.1 G/DL (ref 6.4–8.2)
RBC # BLD AUTO: 5.08 MILLION/UL (ref 3.88–5.62)
SODIUM SERPL-SCNC: 138 MMOL/L (ref 136–145)
WBC # BLD AUTO: 5.8 THOUSAND/UL (ref 4.31–10.16)

## 2022-02-02 PROCEDURE — 85027 COMPLETE CBC AUTOMATED: CPT

## 2022-02-02 PROCEDURE — 36415 COLL VENOUS BLD VENIPUNCTURE: CPT

## 2022-02-02 PROCEDURE — 80053 COMPREHEN METABOLIC PANEL: CPT

## 2022-02-10 ENCOUNTER — HOSPITAL ENCOUNTER (OUTPATIENT)
Dept: RADIOLOGY | Age: 72
Discharge: HOME | End: 2022-02-10
Payer: COMMERCIAL

## 2022-02-10 DIAGNOSIS — C85.90 LYMPHOMA, UNSPECIFIED BODY REGION, UNSPECIFIED LYMPHOMA TYPE (HCC): ICD-10-CM

## 2022-02-10 PROCEDURE — 74177 CT ABD & PELVIS W/CONTRAST: CPT

## 2022-02-10 PROCEDURE — G1004 CDSM NDSC: HCPCS

## 2022-02-10 PROCEDURE — 71260 CT THORAX DX C+: CPT

## 2022-02-10 RX ADMIN — IOHEXOL 100 ML: 350 INJECTION, SOLUTION INTRAVENOUS at 10:38

## 2022-02-21 ENCOUNTER — HOSPITAL ENCOUNTER (OUTPATIENT)
Dept: NON INVASIVE DIAGNOSTICS | Facility: CLINIC | Age: 72
Discharge: HOME | End: 2022-02-21
Payer: COMMERCIAL

## 2022-02-21 VITALS — HEIGHT: 64 IN | BODY MASS INDEX: 28.34 KG/M2 | WEIGHT: 166 LBS

## 2022-02-21 DIAGNOSIS — I25.10 CORONARY ARTERIOSCLEROSIS: ICD-10-CM

## 2022-02-21 DIAGNOSIS — E78.00 PURE HYPERCHOLESTEROLEMIA: ICD-10-CM

## 2022-02-21 DIAGNOSIS — I10 ESSENTIAL (PRIMARY) HYPERTENSION: ICD-10-CM

## 2022-02-21 LAB
MAX HR PERCENT: 97 %
NUC STRESS EJECTION FRACTION: 56 %
RATE PRESSURE PRODUCT: NORMAL
SL CV REST NUCLEAR ISOTOPE DOSE: 10.37 MCI
SL CV STRESS NUCLEAR ISOTOPE DOSE: 31.4 MCI
SL CV STRESS RECOVERY BP: NORMAL MMHG
SL CV STRESS RECOVERY HR: 92 BPM
SL CV STRESS RECOVERY O2 SAT: 97 %
SL CV STRESS STAGE REACHED: 3
STRESS ANGINA INDEX: 0
STRESS BASELINE BP: NORMAL MMHG
STRESS BASELINE HR: 75 BPM
STRESS DUKE TREADMILL SCORE: -2
STRESS O2 SAT REST: 98 %
STRESS PEAK HR: 146 BPM
STRESS PERCENT HR: 97 %
STRESS POST ESTIMATED WORKLOAD: 10.1 METS
STRESS POST EXERCISE DUR MIN: 8 MIN
STRESS POST O2 SAT PEAK: 98 %
STRESS POST PEAK BP: 182 MMHG
STRESS ST DEPRESSION: 2 MM
STRESS TARGET HR: 146 BPM
STRESS/REST PERFUSION RATIO: 0.95

## 2022-02-21 PROCEDURE — 78452 HT MUSCLE IMAGE SPECT MULT: CPT

## 2022-02-21 PROCEDURE — 78452 HT MUSCLE IMAGE SPECT MULT: CPT | Performed by: INTERNAL MEDICINE

## 2022-02-21 PROCEDURE — 93018 CV STRESS TEST I&R ONLY: CPT | Performed by: INTERNAL MEDICINE

## 2022-02-21 PROCEDURE — A9502 TC99M TETROFOSMIN: HCPCS

## 2022-02-21 PROCEDURE — 93016 CV STRESS TEST SUPVJ ONLY: CPT | Performed by: INTERNAL MEDICINE

## 2022-02-21 PROCEDURE — 93017 CV STRESS TEST TRACING ONLY: CPT

## 2022-02-21 PROCEDURE — G1004 CDSM NDSC: HCPCS

## 2022-02-22 ENCOUNTER — OFFICE VISIT (OUTPATIENT)
Dept: DERMATOLOGY | Facility: CLINIC | Age: 72
End: 2022-02-22
Payer: COMMERCIAL

## 2022-02-22 VITALS — BODY MASS INDEX: 28.2 KG/M2 | TEMPERATURE: 98.3 F | HEIGHT: 64 IN | WEIGHT: 165.2 LBS

## 2022-02-22 DIAGNOSIS — L82.1 SEBORRHEIC KERATOSIS: ICD-10-CM

## 2022-02-22 DIAGNOSIS — L91.8 ACROCHORDON: Primary | ICD-10-CM

## 2022-02-22 DIAGNOSIS — I87.2 VENOUS STASIS DERMATITIS OF BOTH LOWER EXTREMITIES: ICD-10-CM

## 2022-02-22 PROCEDURE — 99213 OFFICE O/P EST LOW 20 MIN: CPT | Performed by: DERMATOLOGY

## 2022-02-22 NOTE — PATIENT INSTRUCTIONS
1  ACROCHORDON ("SKIN TAG")    Assessment and Plan:  Based on a thorough discussion of this condition and the management approach to it (including a comprehensive discussion of the known risks, side effects and potential benefits of treatment), the patient (family) agrees to implement the following specific plan:   Reassured benign     Skin tags are common, soft, harmless skin lesions that are also called, in the appropriate settings, papillomas, fibroepithelial polyps, and soft fibromas  They are made up of loosely arranged collagen fibers and blood vessels surrounded by a thickened or thinned-out epidermis  Skin tags tend to develop in both men and women as we grow older  They are usually found on the skin folds (neck, armpits, groin)  It is not known what specifically causes skin tags  Certain factors, though, do appear to play a role:   Chaffing and irritation from skin rubbing together   High levels of growth factors (as seen, for example, in pregnancy or in acromegaly/gigantism)   Insulin resistance   Human papillomavirus (wart virus)    We discussed that most skin tags do not need to be treated unless they are specifically causing the patient physical distress or limitation or pose a risk for a larger problem such as an infection that forms secondary to excoriation or chronic irritation  We had a thorough discussion of treatment options and specific risks (including that any procedural treatment may not be covered by insurance and would then be the patient's responsibility) and benefits/alternatives including but not limited to the following:   Cryotherapy (freezing)   Shave removal   Surgical excision (snip excision with scissors)   Electrosurgery   Ligation (we do not do this procedure and counseled against it due to risk of tissue necrosis and infection)      2   SEBORRHEIC KERATOSIS; NON-INFLAMED    Assessment and Plan:  Based on a thorough discussion of this condition and the management approach to it (including a comprehensive discussion of the known risks, side effects and potential benefits of treatment), the patient (family) agrees to implement the following specific plan:   Reassured benign     Seborrheic Keratosis  A seborrheic keratosis is a harmless warty spot that appears during adult life as a common sign of skin aging  Seborrheic keratoses can arise on any area of skin, covered or uncovered, with the usual exception of the palms and soles  They do not arise from mucous membranes  Seborrheic keratoses can have highly variable appearance  Seborrheic keratoses are extremely common  It has been estimated that over 90% of adults over the age of 61 years have one or more of them  They occur in males and females of all races, typically beginning to erupt in the 35s or 45s  They are uncommon under the age of 21 years  The precise cause of seborrhoeic keratoses is not known  Seborrhoeic keratoses are considered degenerative in nature  As time goes by, seborrheic keratoses tend to become more numerous  Some people inherit a tendency to develop a very large number of them; some people may have hundreds of them  The name "seborrheic keratosis" is misleading, because these lesions are not limited to a seborrhoeic distribution (scalp, mid-face, chest, upper back), nor are they formed from sebaceous glands, nor are they associated with sebum -- which is greasy    Seborrheic keratosis may also be called "SK," "Seb K," "basal cell papilloma," "senile wart," or "barnacle "      Researchers have noted:   Eruptive seborrhoeic keratoses can follow sunburn or dermatitis   Skin friction may be the reason they appear in body folds   Viral cause (e g , human papillomavirus) seems unlikely   Stable and clonal mutations or activation of FRFR3, PIK3CA, CHAVA, AKT1 and EGFR genes are found in seborrhoeic keratoses   Seborrhoeic keratosis can arise from solar lentigo   FRFR3 mutations also arise in solar lentigines  These mutations are associated with increased age and location on the head and neck, suggesting a role of ultraviolet radiation in these lesions   Seborrheic keratoses do not harbour tumour suppressor gene mutations   Epidermal growth factor receptor inhibitors, which are used to treat some cancers, often result in an increase in verrucal (warty) keratoses  There is no easy way to remove multiple lesions on a single occasion  Unless a specific lesion is "inflamed" and is causing pain or stinging/burning or is bleeding, most insurance companies do not authorize treatment  3  STATIS DERMATITIS ("VENUS ECZEMA")        Assessment and Plan:  Based on a thorough discussion of this condition and the management approach to it (including a comprehensive discussion of the known risks, side effects and potential benefits of treatment), the patient (family) agrees to implement the following specific plan:  Use moisturizer like Eucerin,Cerave, Vanicream or Aveeno Cream 3 times a day for the dry skin             Consider wearing compression stockings (Dark Socks)    What is venous eczema? Venous eczema is a common form of eczema/dermatitis that affects one or both lower legs in association with venous insufficiency  It is also called gravitational dermatitis  Who gets venous eczema? Venous eczema is most often seen in middle-aged and elderly patients -- it is reported to affect 20% of those over 70 years  It is associated with:   History of deep venous thrombosis in an affected limb   History of cellulitis in an affected limb   Chronic swelling of the lower leg, aggravated by hot weather and prolonged standing   Varicose veins   Venous leg ulcers  What causes venous eczema? Venous eczema appears to be due to fluid collecting in the tissues and activation of the innate immune response      Normally during walking the leg muscles pump blood upwards and valves in the veins prevent pooling  A clot in the deep leg veins (deep venous thrombosis or DVT) or varicose veins may damage the valves  As a result back pressure develops and fluid collects in the tissues  An inflammatory reaction occurs  What are the clinical features of venous eczema? Venous eczema can form discrete patches or become confluent and circumferential  Features include:   Itchy red, blistered and crusted plaques; or dry fissured and scaly plaques on one or both lower legs   Orange-brown macular pigmentation due to haemosiderin deposition   Atrophie chapo (white irregular scars surrounded by red spots)   'Champagne bottle' shape of the lower leg -- narrowing at the ankles and induration (lipodermatosclerosis)    What are the complications of venous eczema?  Impetiginisation -- secondary infection with Staphylococcus aureus resulting in yellowish crusts   Cellulitis -- infection with Streptococcus pyogenes: there may be redness, swelling, pain, fever, a red streak up the leg and swollen nodes in the groin   Secondary eczema -- the eczema spreads to other areas on the body   Contact allergy to one or more components of the ointments or creams used    How is venous eczema diagnosed? The diagnosis of venous eczema is clinical   Patch tests may be undertaken if there is suspicion of contact allergy  What is the treatment for venous eczema? Reduce swelling in the leg   Don't stand for long periods   Take regular walks   Elevate your feet when sitting: if your legs are swollen they need to be above your hips to drain effectively   Elevate the foot of your bed overnight   During the acute phase of eczema, bandaging is important to reduce swelling   When eczema has settled, wear graduated compression socks or stockings long term  Fitted moderate to high compression socks can be obtained from a surgical supplies company   Light compression using travel socks may be adequate, and these are easy to put on  They can be bought at pharmacies, [employer] and sports stores  More compression is obtained by wearing two pairs   Horse chestnut extract appears to be of benefit for at least some patients with venous disease  Treat the eczema   Dry up oozing patches with Condy's solution (potassium permanganate) or dilute vinegar on gauze as compresses   Oral antibiotics such as flucloxacillin may be prescribed for a secondary infection   Apply a prescribed topical steroid: start with a potent steroid cream applied accurately daily to the patches until they have flattened out  After a few days, change to a milder steroid cream (eg  hydrocortisone) until the itchy patches have resolved (maintenance treatment)  Check with your doctor if you are using steroid creams for more than a few weeks  Overuse can thin the skin, but short courses of stronger preparations can be used from time to time if necessary to control dermatitis  Coal tar ointment may also help   Use a moisturizing cream frequently to keep the skin on the legs smooth and soft  If the skin is very scaly, urea cream may be especially effective   Protect your skin from injury: this can result in infection or ulceration that may be difficult to heal     Treatment for varicose veins   Seek the opinion of a vascular surgeon regarding varicose veins   These can be treated surgically or sclerotherapy   Varicose veins may develop again after an apparently successful operation because venous disease is progressive  How can venous eczema be prevented? Venous eczema cannot be completely prevented but the number and severity of flare-ups can be reduced by the following measures     Avoid prolonged standing or sitting with legs down   Wear compression socks or stockings   Avoid and treat leg swelling   Apply emollients frequently and regularly to dry skin   Avoid soap; use water alone or non-soap cleansers when bathing    What is the outlook for venous eczema? Venous eczema tends to be a recurring or chronic disorder lifelong  Treat recurrence promptly with topical steroids

## 2022-02-22 NOTE — PROGRESS NOTES
[facility] Note     Patient Name: Rian Nichole  Encounter Date: 02/22/2022     Have you been cared for by a [facility] Dermatologist in the last 3 years and, if so, which one? No    · Have you traveled outside of the [address] [place] [address] in the past 3 months or outside of the [facility] area in the last 2 weeks? No     May we call your Preferred Phone number to discuss your specific medical information? Yes     May we leave a detailed message that includes your specific medical information? Yes      Today's Chief Concerns:   Concern #1:  Full Body Check       Past Medical History:  Have you personally ever had or currently have any of the following? · Skin cancer (such as Melanoma, Basal Cell Carcinoma, Squamous Cell Carcinoma? (If Yes, please provide more detail)- No  · Eczema: YES  · Psoriasis: No  · HIV/AIDS: No  · Hepatitis B or C: No  · Tuberculosis: No  · Systemic Immunosuppression such as Diabetes, Biologic or Immunotherapy, Chemotherapy, Organ Transplantation, Bone Marrow Transplantation (If YES, please provide more detail): No  · Radiation Treatment (If YES, please provide more detail): No  · Any other major medical conditions/concerns? 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 Working out and walking dog     Family History:  Have any of your "first degree relatives" (parent, brother, sister, or child) had any of the following       · Skin cancer such as Melanoma or Merkel Cell Carcinoma or Pancreatic Cancer? No  · Eczema, Asthma, Hay Fever or Seasonal Allergies: No  · Psoriasis or Psoriatic Arthritis: No  · Do any other medical conditions seem to run in your family? If Yes, what condition and which relatives?   No    Current Medications:     Current Outpatient Medications:     ALPRAZolam (XANAX) 0 5 mg tablet, Take 0 5 mg by mouth daily, Disp: , Rfl: 0   aspirin 325 mg tablet, Take 1 tablet by mouth daily, Disp: , Rfl:     cetirizine (ZYRTEC ALLERGY) 10 mg tablet, Take 1 tablet by mouth daily, Disp: , Rfl:     co-enzyme Q-10 30 MG capsule, Take 30 mg by mouth 3 (three) times a day, Disp: , Rfl:     levothyroxine 175 mcg tablet, Take 1 tablet by mouth daily, Disp: , Rfl:     Magnesium 200 MG TABS, Take by mouth, Disp: , Rfl:     melatonin 3 mg, Take by mouth, Disp: , Rfl:     metoprolol tartrate (LOPRESSOR) 25 mg tablet, Take 25 mg by mouth 2 (two) times a day, Disp: , Rfl: 0    Multiple Vitamin tablet, Take 1 tablet by mouth daily, Disp: , Rfl:     pantoprazole (PROTONIX) 40 mg tablet, Take 1 tablet by mouth 2 (two) times a day  , Disp: , Rfl:     Potassium 99 MG TABS, Take by mouth, Disp: , Rfl:     simvastatin (ZOCOR) 40 mg tablet, Take 1 tablet by mouth daily, Disp: , Rfl:     amLODIPine (NORVASC) 5 mg tablet, Take 5 mg by mouth daily   (Patient not taking: Reported on 2/22/2022 ), Disp: , Rfl:       Review of Systems:  Have you recently had or currently have any of the following? If YES, what are you doing for the problem? · Fever, chills or unintended weight loss: No  · Sudden loss or change in your vision: No  · Nausea, vomiting or blood in your stool: No  · Painful or swollen joints: No  · Wheezing or cough: No  · Changing mole or non-healing wound: No  · Nosebleeds: No  · Excessive sweating: No  · Easy or prolonged bleeding? No  · Over the last 2 weeks, how often have you been bothered by the following problems? · Taking little interest or pleasure in doing things: 1 - Not at All  · Feeling down, depressed, or hopeless: 1 - Not at All  · Rapid heartbeat with epinephrine:  No    · FEMALES ONLY:    · Are you pregnant or planning to become pregnant? N/A  · Are you currently or planning to be nursing or breast feeding? N/A    · Any known allergies?       · No Known Allergies      Physical Exam:     Was a chaperone (Derm Clinical Assistant) present throughout the entire Physical Exam? Yes     Did the Dermatology Team specifically  the patient on the importance of a Full Skin Exam to be sure that nothing is missed clinically? Yes}  o Did the patient ultimately request or accept a Full Skin Exam?  Yes  o Did the patient specifically refuse to have the areas "under-the-bra" examined by the Dermatologist? No  o Did the patient specifically refuse to have the areas "under-the-underwear" examined by the Dermatologist? No    CONSTITUTIONAL:   Vitals:    02/22/22 1010   Temp: 98 3 °F (36 8 °C)   TempSrc: Temporal   Weight: 74 9 kg (165 lb 3 2 oz)   Height: 5' 4" (1 626 m)       PSYCH: Normal mood and affect  EYES: Normal conjunctiva  ENT: Normal lips and oral mucosa  CARDIOVASCULAR: No edema  RESPIRATORY: Normal respirations  HEME/LYMPH/IMMUNO:  No regional lymphadenopathy except as noted below in "ASSESSMENT AND PLAN BY DIAGNOSIS"    SKIN:  FULL ORGAN SYSTEM EXAM   Hair, Scalp, Ears, Face Normal except as noted below in Assessment   Neck, Cervical Chain Nodes Normal except as noted below in Assessment   Right Arm/Hand/Fingers Normal except as noted below in Assessment   Left Arm/Hand/Fingers Normal except as noted below in Assessment   Chest/Breasts/Axillae Viewed areas Normal except as noted below in Assessment   Abdomen, Umbilicus Normal except as noted below in Assessment   Back/Spine Normal except as noted below in Assessment   Groin/Genitalia/Buttocks Normal except as noted below in Assessment   Right Leg, Foot, Toes Normal except as noted below in Assessment   Left Leg, Foot, Toes Normal except as noted below in Assessment        Assessment and Plan by Diagnosis:      1  ACROCHORDON ("SKIN TAG")    Physical Exam:   Anatomic Location Affected:  Left Upper Arm    Morphological Description:  brown and skin colored papules   Pertinent Positives:   Pertinent Negatives:     Additional History of Present Condition:  N/A    Assessment and Plan:  Based on a thorough discussion of this condition and the management approach to it (including a comprehensive discussion of the known risks, side effects and potential benefits of treatment), the patient (family) agrees to implement the following specific plan:   Reassured benign     Skin tags are common, soft, harmless skin lesions that are also called, in the appropriate settings, papillomas, fibroepithelial polyps, and soft fibromas  They are made up of loosely arranged collagen fibers and blood vessels surrounded by a thickened or thinned-out epidermis  Skin tags tend to develop in both men and women as we grow older  They are usually found on the skin folds (neck, armpits, groin)  It is not known what specifically causes skin tags  Certain factors, though, do appear to play a role:   Chaffing and irritation from skin rubbing together   High levels of growth factors (as seen, for example, in pregnancy or in acromegaly/gigantism)   Insulin resistance   Human papillomavirus (wart virus)    We discussed that most skin tags do not need to be treated unless they are specifically causing the patient physical distress or limitation or pose a risk for a larger problem such as an infection that forms secondary to excoriation or chronic irritation  We had a thorough discussion of treatment options and specific risks (including that any procedural treatment may not be covered by insurance and would then be the patient's responsibility) and benefits/alternatives including but not limited to the following:   Cryotherapy (freezing)   Shave removal   Surgical excision (snip excision with scissors)   Electrosurgery   Ligation (we do not do this procedure and counseled against it due to risk of tissue necrosis and infection)      2   SEBORRHEIC KERATOSIS; NON-INFLAMED    Physical Exam:   Anatomic Location Affected:  Right lower calf    Morphological Description:  Flat and raised, waxy, smooth to warty textured, yellow to brownish-grey to dark brown to blackish, discrete, "stuck-on" appearing papules   Pertinent Positives:   Pertinent Negatives: Additional History of Present Condition:  Patient reports new bumps on the skin  Denies itch, burn, pain, bleeding or ulceration  Present constantly; nothing seems to make it worse or better  No prior treatment  Assessment and Plan:  Based on a thorough discussion of this condition and the management approach to it (including a comprehensive discussion of the known risks, side effects and potential benefits of treatment), the patient (family) agrees to implement the following specific plan:   Reassured benign     Seborrheic Keratosis  A seborrheic keratosis is a harmless warty spot that appears during adult life as a common sign of skin aging  Seborrheic keratoses can arise on any area of skin, covered or uncovered, with the usual exception of the palms and soles  They do not arise from mucous membranes  Seborrheic keratoses can have highly variable appearance  Seborrheic keratoses are extremely common  It has been estimated that over 90% of adults over the age of 61 years have one or more of them  They occur in males and females of all races, typically beginning to erupt in the 35s or 45s  They are uncommon under the age of 21 years  The precise cause of seborrhoeic keratoses is not known  Seborrhoeic keratoses are considered degenerative in nature  As time goes by, seborrheic keratoses tend to become more numerous  Some people inherit a tendency to develop a very large number of them; some people may have hundreds of them  The name "seborrheic keratosis" is misleading, because these lesions are not limited to a seborrhoeic distribution (scalp, mid-face, chest, upper back), nor are they formed from sebaceous glands, nor are they associated with sebum -- which is greasy    Seborrheic keratosis may also be called "SK," "Ulises K," "basal cell papilloma," "senile wart," or "barnacle "      Researchers have noted:   Eruptive seborrhoeic keratoses can follow sunburn or dermatitis   Skin friction may be the reason they appear in body folds   Viral cause (e g , human papillomavirus) seems unlikely   Stable and clonal mutations or activation of FRFR3, PIK3CA, CHAVA, AKT1 and EGFR genes are found in seborrhoeic keratoses   Seborrhoeic keratosis can arise from solar lentigo   FRFR3 mutations also arise in solar lentigines  These mutations are associated with increased age and location on the head and neck, suggesting a role of ultraviolet radiation in these lesions   Seborrheic keratoses do not harbour tumour suppressor gene mutations   Epidermal growth factor receptor inhibitors, which are used to treat some cancers, often result in an increase in verrucal (warty) keratoses  There is no easy way to remove multiple lesions on a single occasion  Unless a specific lesion is "inflamed" and is causing pain or stinging/burning or is bleeding, most insurance companies do not authorize treatment  3  STATIS DERMATITIS ("VENUS ECZEMA")    Physical Exam:   Anatomic Location Affected:  Bilateral Lower Legs    Morphological Description:    Pertinent Positives:   Pertinent Negatives: Additional History of Present Condition:  N/A    Assessment and Plan:  Based on a thorough discussion of this condition and the management approach to it (including a comprehensive discussion of the known risks, side effects and potential benefits of treatment), the patient (family) agrees to implement the following specific plan:  Use moisturizer like Eucerin,Cerave, Vanicream or Aveeno Cream 3 times a day for the dry skin             Consider wearing compression stockings (Dark Socks)    What is venous eczema? Venous eczema is a common form of eczema/dermatitis that affects one or both lower legs in association with venous insufficiency   It is also called gravitational dermatitis  Who gets venous eczema? Venous eczema is most often seen in middle-aged and elderly patients -- it is reported to affect 20% of those over 70 years  It is associated with:   History of deep venous thrombosis in an affected limb   History of cellulitis in an affected limb   Chronic swelling of the lower leg, aggravated by hot weather and prolonged standing   Varicose veins   Venous leg ulcers  What causes venous eczema? Venous eczema appears to be due to fluid collecting in the tissues and activation of the innate immune response  Normally during walking the leg muscles pump blood upwards and valves in the veins prevent pooling  A clot in the deep leg veins (deep venous thrombosis or DVT) or varicose veins may damage the valves  As a result back pressure develops and fluid collects in the tissues  An inflammatory reaction occurs  What are the clinical features of venous eczema? Venous eczema can form discrete patches or become confluent and circumferential  Features include:   Itchy red, blistered and crusted plaques; or dry fissured and scaly plaques on one or both lower legs   Orange-brown macular pigmentation due to haemosiderin deposition   Atrophie chapo (white irregular scars surrounded by red spots)   'Champagne bottle' shape of the lower leg -- narrowing at the ankles and induration (lipodermatosclerosis)    What are the complications of venous eczema?  Impetiginisation -- secondary infection with Staphylococcus aureus resulting in yellowish crusts   Cellulitis -- infection with Streptococcus pyogenes: there may be redness, swelling, pain, fever, a red streak up the leg and swollen nodes in the groin   Secondary eczema -- the eczema spreads to other areas on the body   Contact allergy to one or more components of the ointments or creams used    How is venous eczema diagnosed?   The diagnosis of venous eczema is clinical   Patch tests may be undertaken if there is suspicion of contact allergy  What is the treatment for venous eczema? Reduce swelling in the leg   Don't stand for long periods   Take regular walks   Elevate your feet when sitting: if your legs are swollen they need to be above your hips to drain effectively   Elevate the foot of your bed overnight   During the acute phase of eczema, bandaging is important to reduce swelling   When eczema has settled, wear graduated compression socks or stockings long term  Fitted moderate to high compression socks can be obtained from a surgical supplies company  Light compression using travel socks may be adequate, and these are easy to put on  They can be bought at pharmacies, travel and sports stores  More compression is obtained by wearing two pairs   Horse chestnut extract appears to be of benefit for at least some patients with venous disease  Treat the eczema   Dry up oozing patches with Condy's solution (potassium permanganate) or dilute vinegar on gauze as compresses   Oral antibiotics such as flucloxacillin may be prescribed for a secondary infection   Apply a prescribed topical steroid: start with a potent steroid cream applied accurately daily to the patches until they have flattened out  After a few days, change to a milder steroid cream (eg  hydrocortisone) until the itchy patches have resolved (maintenance treatment)  Check with your doctor if you are using steroid creams for more than a few weeks  Overuse can thin the skin, but short courses of stronger preparations can be used from time to time if necessary to control dermatitis  Coal tar ointment may also help   Use a moisturizing cream frequently to keep the skin on the legs smooth and soft  If the skin is very scaly, urea cream may be especially effective     Protect your skin from injury: this can result in infection or ulceration that may be difficult to heal     Treatment for varicose veins   Seek the opinion of a vascular surgeon regarding varicose veins   These can be treated surgically or sclerotherapy   Varicose veins may develop again after an apparently successful operation because venous disease is progressive  How can venous eczema be prevented? Venous eczema cannot be completely prevented but the number and severity of flare-ups can be reduced by the following measures   Avoid prolonged standing or sitting with legs down   Wear compression socks or stockings   Avoid and treat leg swelling   Apply emollients frequently and regularly to dry skin   Avoid soap; use water alone or non-soap cleansers when bathing    What is the outlook for venous eczema? Venous eczema tends to be a recurring or chronic disorder lifelong  Treat recurrence promptly with topical steroids          Scribe Attestation    I,:  Maynor Juan am acting as a scribe while in the presence of the attending physician :       I,:  Olga Lidia Ladd MD personally performed the services described in this documentation    as scribed in my presence :

## 2022-02-23 LAB
ARRHY DURING EX: NORMAL
CHEST PAIN STATEMENT: NORMAL
MAX DIASTOLIC BP: 70 MMHG
MAX HEART RATE: 146 BPM
MAX PREDICTED HEART RATE: 150 BPM
MAX. SYSTOLIC BP: 182 MMHG
PROTOCOL NAME: NORMAL
REASON FOR TERMINATION: NORMAL
TARGET HR FORMULA: NORMAL
TEST INDICATION: NORMAL
TIME IN EXERCISE PHASE: NORMAL

## 2022-02-26 ENCOUNTER — APPOINTMENT (OUTPATIENT)
Dept: LAB | Facility: CLINIC | Age: 72
End: 2022-02-26
Payer: COMMERCIAL

## 2022-02-26 DIAGNOSIS — E78.5 HYPERLIPIDEMIA, UNSPECIFIED HYPERLIPIDEMIA TYPE: ICD-10-CM

## 2022-02-26 LAB
CHOLEST SERPL-MCNC: 137 MG/DL
HDLC SERPL-MCNC: 35 MG/DL
LDLC SERPL CALC-MCNC: 60 MG/DL (ref 0–100)
NONHDLC SERPL-MCNC: 102 MG/DL
TRIGL SERPL-MCNC: 210 MG/DL

## 2022-02-26 PROCEDURE — 80061 LIPID PANEL: CPT

## 2022-02-26 PROCEDURE — 36415 COLL VENOUS BLD VENIPUNCTURE: CPT

## 2022-03-02 ENCOUNTER — APPOINTMENT (OUTPATIENT)
Dept: LAB | Facility: CLINIC | Age: 72
End: 2022-03-02
Payer: COMMERCIAL

## 2022-03-02 DIAGNOSIS — I25.10 CVD (CARDIOVASCULAR DISEASE): ICD-10-CM

## 2022-03-02 DIAGNOSIS — N40.1 BPH WITH OBSTRUCTION/LOWER URINARY TRACT SYMPTOMS: ICD-10-CM

## 2022-03-02 DIAGNOSIS — I10 BENIGN HYPERTENSION: ICD-10-CM

## 2022-03-02 DIAGNOSIS — N13.8 BPH WITH OBSTRUCTION/LOWER URINARY TRACT SYMPTOMS: ICD-10-CM

## 2022-03-02 DIAGNOSIS — E03.9 PRIMARY HYPOTHYROIDISM: ICD-10-CM

## 2022-03-02 LAB
ALBUMIN SERPL BCP-MCNC: 4.1 G/DL (ref 3.5–5)
ALP SERPL-CCNC: 59 U/L (ref 46–116)
ALT SERPL W P-5'-P-CCNC: 34 U/L (ref 12–78)
ANION GAP SERPL CALCULATED.3IONS-SCNC: 2 MMOL/L (ref 4–13)
AST SERPL W P-5'-P-CCNC: 23 U/L (ref 5–45)
BASOPHILS # BLD AUTO: 0.05 THOUSANDS/ΜL (ref 0–0.1)
BASOPHILS NFR BLD AUTO: 1 % (ref 0–1)
BILIRUB SERPL-MCNC: 0.69 MG/DL (ref 0.2–1)
BUN SERPL-MCNC: 19 MG/DL (ref 5–25)
CALCIUM SERPL-MCNC: 9.3 MG/DL (ref 8.3–10.1)
CHLORIDE SERPL-SCNC: 105 MMOL/L (ref 100–108)
CO2 SERPL-SCNC: 30 MMOL/L (ref 21–32)
CREAT SERPL-MCNC: 0.9 MG/DL (ref 0.6–1.3)
EOSINOPHIL # BLD AUTO: 0.28 THOUSAND/ΜL (ref 0–0.61)
EOSINOPHIL NFR BLD AUTO: 5 % (ref 0–6)
ERYTHROCYTE [DISTWIDTH] IN BLOOD BY AUTOMATED COUNT: 13.3 % (ref 11.6–15.1)
GFR SERPL CREATININE-BSD FRML MDRD: 86 ML/MIN/1.73SQ M
GLUCOSE P FAST SERPL-MCNC: 106 MG/DL (ref 65–99)
HCT VFR BLD AUTO: 46.5 % (ref 36.5–49.3)
HGB BLD-MCNC: 15.9 G/DL (ref 12–17)
IMM GRANULOCYTES # BLD AUTO: 0.01 THOUSAND/UL (ref 0–0.2)
IMM GRANULOCYTES NFR BLD AUTO: 0 % (ref 0–2)
LYMPHOCYTES # BLD AUTO: 0.58 THOUSANDS/ΜL (ref 0.6–4.47)
LYMPHOCYTES NFR BLD AUTO: 11 % (ref 14–44)
MAGNESIUM SERPL-MCNC: 2.4 MG/DL (ref 1.6–2.6)
MCH RBC QN AUTO: 32.2 PG (ref 26.8–34.3)
MCHC RBC AUTO-ENTMCNC: 34.2 G/DL (ref 31.4–37.4)
MCV RBC AUTO: 94 FL (ref 82–98)
MONOCYTES # BLD AUTO: 0.51 THOUSAND/ΜL (ref 0.17–1.22)
MONOCYTES NFR BLD AUTO: 9 % (ref 4–12)
NEUTROPHILS # BLD AUTO: 4.09 THOUSANDS/ΜL (ref 1.85–7.62)
NEUTS SEG NFR BLD AUTO: 74 % (ref 43–75)
NRBC BLD AUTO-RTO: 0 /100 WBCS
PLATELET # BLD AUTO: 170 THOUSANDS/UL (ref 149–390)
PMV BLD AUTO: 10.2 FL (ref 8.9–12.7)
POTASSIUM SERPL-SCNC: 4.5 MMOL/L (ref 3.5–5.3)
PROT SERPL-MCNC: 7.2 G/DL (ref 6.4–8.2)
PSA SERPL-MCNC: 1.1 NG/ML (ref 0–4)
RBC # BLD AUTO: 4.94 MILLION/UL (ref 3.88–5.62)
SODIUM SERPL-SCNC: 137 MMOL/L (ref 136–145)
TSH SERPL DL<=0.05 MIU/L-ACNC: 2.78 UIU/ML (ref 0.36–3.74)
WBC # BLD AUTO: 5.52 THOUSAND/UL (ref 4.31–10.16)

## 2022-03-02 PROCEDURE — 80053 COMPREHEN METABOLIC PANEL: CPT

## 2022-03-02 PROCEDURE — 84153 ASSAY OF PSA TOTAL: CPT

## 2022-03-02 PROCEDURE — 84443 ASSAY THYROID STIM HORMONE: CPT

## 2022-03-02 PROCEDURE — 83735 ASSAY OF MAGNESIUM: CPT

## 2022-03-02 PROCEDURE — 85025 COMPLETE CBC W/AUTO DIFF WBC: CPT

## 2022-03-02 PROCEDURE — 36415 COLL VENOUS BLD VENIPUNCTURE: CPT

## 2022-03-08 ENCOUNTER — LAB REQUISITION (OUTPATIENT)
Dept: LAB | Facility: HOSPITAL | Age: 72
End: 2022-03-08
Payer: COMMERCIAL

## 2022-03-08 DIAGNOSIS — N39.0 URINARY TRACT INFECTION, SITE NOT SPECIFIED: ICD-10-CM

## 2022-03-08 LAB
BILIRUB UR QL STRIP: NEGATIVE
CLARITY UR: CLEAR
COLOR UR: YELLOW
GLUCOSE UR STRIP-MCNC: NEGATIVE MG/DL
HGB UR QL STRIP.AUTO: NEGATIVE
KETONES UR STRIP-MCNC: NEGATIVE MG/DL
LEUKOCYTE ESTERASE UR QL STRIP: NEGATIVE
NITRITE UR QL STRIP: NEGATIVE
PH UR STRIP.AUTO: 6.5 [PH]
PROT UR STRIP-MCNC: NEGATIVE MG/DL
SP GR UR STRIP.AUTO: 1.02 (ref 1–1.03)
UROBILINOGEN UR STRIP-ACNC: <2 MG/DL

## 2022-03-08 PROCEDURE — 87086 URINE CULTURE/COLONY COUNT: CPT | Performed by: INTERNAL MEDICINE

## 2022-03-08 PROCEDURE — 81003 URINALYSIS AUTO W/O SCOPE: CPT | Performed by: INTERNAL MEDICINE

## 2022-03-09 LAB — BACTERIA UR CULT: NORMAL

## 2022-04-12 ENCOUNTER — OFFICE VISIT (OUTPATIENT)
Dept: UROLOGY | Facility: CLINIC | Age: 72
End: 2022-04-12
Payer: COMMERCIAL

## 2022-04-12 VITALS
DIASTOLIC BLOOD PRESSURE: 80 MMHG | BODY MASS INDEX: 29.53 KG/M2 | WEIGHT: 173 LBS | HEIGHT: 64 IN | SYSTOLIC BLOOD PRESSURE: 124 MMHG

## 2022-04-12 DIAGNOSIS — N13.8 BPH WITH OBSTRUCTION/LOWER URINARY TRACT SYMPTOMS: Primary | ICD-10-CM

## 2022-04-12 DIAGNOSIS — N40.1 BPH WITH OBSTRUCTION/LOWER URINARY TRACT SYMPTOMS: Primary | ICD-10-CM

## 2022-04-12 PROCEDURE — 99213 OFFICE O/P EST LOW 20 MIN: CPT | Performed by: PHYSICIAN ASSISTANT

## 2022-04-12 RX ORDER — TAMSULOSIN HYDROCHLORIDE 0.4 MG/1
0.4 CAPSULE ORAL
Qty: 90 CAPSULE | Refills: 3 | Status: SHIPPED | OUTPATIENT
Start: 2022-04-12

## 2022-04-12 NOTE — PROGRESS NOTES
UROLOGY PROGRESS NOTE   Patient Identifiers: Karishma Harper (MRN [id number])  Date of Service: 4/12/2022    Subjective:    72-year-old man history of BPH and kidney stones  PSA is 1 1  He has  not had any recent kidney stone episodes  He complains of nocturia up to 4 times per night  No significant daytime frequency  He is not on any medications        Reason for visit:  BPH follow-up     Objective:     VITALS:    Vitals:    04/12/22 1311   BP: 124/80           LABS:  Lab Results   Component Value Date    HGB 15 9 03/02/2022    HCT 46 5 03/02/2022    WBC 5 52 03/02/2022     03/02/2022   ]    Lab Results   Component Value Date    K 4 5 03/02/2022     03/02/2022    CO2 30 03/02/2022    BUN 19 03/02/2022    CREATININE 0 90 03/02/2022    CALCIUM 9 3 03/02/2022   ]        INPATIENT MEDS:    Current Outpatient Medications:     ALPRAZolam (XANAX) 0 5 mg tablet, Take 0 5 mg by mouth daily, Disp: , Rfl: 0    aspirin 325 mg tablet, Take 1 tablet by mouth daily, Disp: , Rfl:     cetirizine (ZYRTEC ALLERGY) 10 mg tablet, Take 1 tablet by mouth daily, Disp: , Rfl:     co-enzyme Q-10 30 MG capsule, Take 30 mg by mouth 3 (three) times a day, Disp: , Rfl:     levothyroxine 175 mcg tablet, Take 1 tablet by mouth daily, Disp: , Rfl:     Magnesium 200 MG TABS, Take by mouth, Disp: , Rfl:     melatonin 3 mg, Take by mouth, Disp: , Rfl:     metoprolol tartrate (LOPRESSOR) 25 mg tablet, Take 25 mg by mouth 2 (two) times a day, Disp: , Rfl: 0    Multiple Vitamin tablet, Take 1 tablet by mouth daily, Disp: , Rfl:     pantoprazole (PROTONIX) 40 mg tablet, Take 1 tablet by mouth 2 (two) times a day  , Disp: , Rfl:     Potassium 99 MG TABS, Take by mouth, Disp: , Rfl:     simvastatin (ZOCOR) 40 mg tablet, Take 1 tablet by mouth daily, Disp: , Rfl:     tamsulosin (FLOMAX) 0 4 mg, Take 1 capsule (0 4 mg total) by mouth daily with dinner, Disp: 90 capsule, Rfl: 3      Physical Exam:   /80 (BP Location: Left arm, Patient Position: Sitting, Cuff Size: Adult)   Ht 5' 4" (1 626 m)   Wt 78 5 kg (173 lb)   BMI 29 70 kg/m²   GEN: no acute distress    RESP: breathing comfortably with no accessory muscle use    ABD: soft, non-tender, non-distended   INCISION:    EXT: no significant peripheral edema   (Male): Penis circumcised, phallus normal, meatus patent  Testicles descended into scrotum bilaterally without masses nor tenderness  No inguinal hernias bilaterally  BETTYE: Prostate is enlarged at 35 grams  The prostate is not boggy  The prostate is not tender  No nodules noted      RADIOLOGY:    none     Assessment:    1   BPH     Plan:   - will try tamsulosin 0 4 mg  - we discussed lightheadedness and retrograde ejaculation as possible side effects  - follow-up in 1 year with PSA prior to visit  -  -

## 2022-04-23 ENCOUNTER — APPOINTMENT (OUTPATIENT)
Dept: LAB | Facility: CLINIC | Age: 72
End: 2022-04-23
Payer: COMMERCIAL

## 2022-04-23 DIAGNOSIS — N40.1 BPH WITH OBSTRUCTION/LOWER URINARY TRACT SYMPTOMS: ICD-10-CM

## 2022-04-23 DIAGNOSIS — M13.80 OTHER SPECIFIED ARTHRITIS, UNSPECIFIED SITE: ICD-10-CM

## 2022-04-23 DIAGNOSIS — E03.9 PRIMARY HYPOTHYROIDISM: ICD-10-CM

## 2022-04-23 DIAGNOSIS — N13.8 BPH WITH OBSTRUCTION/LOWER URINARY TRACT SYMPTOMS: ICD-10-CM

## 2022-04-23 DIAGNOSIS — G71.19 MYOTONIC DISORDER: ICD-10-CM

## 2022-04-23 LAB
CK SERPL-CCNC: 119 U/L (ref 39–308)
CRP SERPL QL: <3 MG/L
ERYTHROCYTE [SEDIMENTATION RATE] IN BLOOD: 2 MM/HOUR (ref 0–19)
T4 FREE SERPL-MCNC: 1.21 NG/DL (ref 0.76–1.46)
TSH SERPL DL<=0.05 MIU/L-ACNC: 4.18 UIU/ML (ref 0.45–4.5)

## 2022-04-23 PROCEDURE — 84439 ASSAY OF FREE THYROXINE: CPT

## 2022-04-23 PROCEDURE — 85652 RBC SED RATE AUTOMATED: CPT

## 2022-04-23 PROCEDURE — 84443 ASSAY THYROID STIM HORMONE: CPT

## 2022-04-23 PROCEDURE — 86140 C-REACTIVE PROTEIN: CPT

## 2022-04-23 PROCEDURE — 82550 ASSAY OF CK (CPK): CPT

## 2022-04-23 PROCEDURE — 36415 COLL VENOUS BLD VENIPUNCTURE: CPT

## 2022-07-08 DIAGNOSIS — I10 ESSENTIAL (PRIMARY) HYPERTENSION: Primary | ICD-10-CM

## 2022-07-08 RX ORDER — SIMVASTATIN 40 MG
40 TABLET ORAL DAILY
Qty: 90 TABLET | Refills: 3 | Status: SHIPPED | OUTPATIENT
Start: 2022-07-08

## 2022-10-12 PROBLEM — Z12.5 PROSTATE CANCER SCREENING ENCOUNTER, OPTIONS AND RISKS DISCUSSED: Status: RESOLVED | Noted: 2020-12-16 | Resolved: 2022-10-12

## 2022-11-13 NOTE — PROGRESS NOTES
Cardiology Follow Up    Valeria Moran  1950  [id number]  [address] [facility] [address] [phone]    1  Essential (primary) hypertension  POCT ECG      2  Pure hypercholesterolemia        3  Coronary arteriosclerosis            Interval History: Patient is here for a f/u visit  He has HTN and CAD with prior PCI  He has history of Hodgkin's disease diagnosed in 2000    A nuclear ETT performed October 2019  At that time he exercised 9 5 minutes on a treadmill   The stress EKG was equivocal for ischemia but there was no chest pain and myocardial perfusion imaging was normal   The LVEF was 60%  Patient does have CAD with multiple stents in place   Patient did bring in a picture and appears that he had multiple lesions in the LCX and LAD/diagonal which were treated   Three stents were placed in the LAD diagonal system and 2 stents were placed in the LCX     This was done at [facility] in 2012  At that time he had cardiac catheterization and as best he knows he had 5 stents placed in one vessel with good results obtained as I have before and after pictures    The cardiologist that he saw practices at [facility], Dr Olimpia Campbell sees Dr Elise Reynolds in reference to hiatal hernia   Lipid profile done 3/2022 demonstrated total cholesterol of 137 with an HDL of 35 and a calculated LDL of 60  Echo done 4/2021 showed normal LV systolic function with mild LVH and mild to moderate PI  Nuclear ETT done February 2022 showed no scintigraphic evidence of ischemia  Transient ST segment change was noted which resolved early in recovery  Vital signs are stable today  Patient has had no chest pain or significant dyspnea  EKG today demonstrates sinus rhythm and is a normal tracing    Patient Active Problem List   Diagnosis   • Aftercare following surgery of the musculoskeletal system Past Medical History:   Diagnosis Date   • Disease of thyroid gland    • GERD (gastroesophageal reflux disease)    • History of chemotherapy    • Hyperlipidemia    • Hypertension    • Lymphoma ([facility] [address] )      Social History     Socioeconomic History   • Marital status:      Spouse name: Not on file   • Number of children: Not on file   • Years of education: Not on file   • Highest education level: Not on file   Occupational History   • Not on file   Tobacco Use   • Smoking status: Former   • Smokeless tobacco: Never   Vaping Use   • Vaping Use: Never used   Substance and Sexual Activity   • Alcohol use: No   • Drug use: No   • Sexual activity: Not on file   Other Topics Concern   • Not on file   Social History Narrative   • Not on file     Social Determinants of Health     Financial Resource Strain: Not on file   Food Insecurity: Not on file   Transportation Needs: Not on file   Physical Activity: Not on file   Stress: Not on file   Social Connections: Not on file   Intimate Partner Violence: Not on file   Housing Stability: Not on file      Family History   Problem Relation Age of Onset   • No Known Problems Mother    • No Known Problems Father      Past Surgical History:   Procedure Laterality Date   • CORONARY ANGIOPLASTY      4 stents   • OR KNEE SCOPE,MED/LAT MENISECTOMY Left 8/24/2018    Procedure: ARTHROSCOPIC PARTIAL  LATERAL MENISCECTOMY;  Surgeon: Carol Pruitt MD;  Location: [facility];  Service: Orthopedics       Current Outpatient Medications:   •  ALPRAZolam (XANAX) 0 5 mg tablet, Take 0 5 mg by mouth daily, Disp: , Rfl: 0  •  aspirin 325 mg tablet, Take 1 tablet by mouth daily, Disp: , Rfl:   •  co-enzyme Q-10 30 MG capsule, Take 30 mg by mouth 3 (three) times a day, Disp: , Rfl:   •  diphenhydrAMINE (BENADRYL) 25 mg capsule, Take 25 mg by mouth every 6 (six) hours as needed for itching, Disp: , Rfl:   •  levothyroxine 175 mcg tablet, Take 1 tablet by mouth daily, Disp: , Rfl:   •  Magnesium 200 MG TABS, Take by mouth, Disp: , Rfl:   •  melatonin 3 mg, Take by mouth, Disp: , Rfl:   •  metoprolol tartrate (LOPRESSOR) 25 mg tablet, Take 1 tablet (25 mg total) by mouth 2 (two) times a day, Disp: 90 tablet, Rfl: 3  •  Multiple Vitamin tablet, Take 1 tablet by mouth daily, Disp: , Rfl:   •  pantoprazole (PROTONIX) 40 mg tablet, Take 1 tablet by mouth 2 (two) times a day  , Disp: , Rfl:   •  Potassium 99 MG TABS, Take by mouth, Disp: , Rfl:   •  simvastatin (ZOCOR) 40 mg tablet, Take 1 tablet (40 mg total) by mouth daily, Disp: 90 tablet, Rfl: 3  •  tamsulosin (FLOMAX) 0 4 mg, Take 1 capsule (0 4 mg total) by mouth daily with dinner, Disp: 90 capsule, Rfl: 3  No Known Allergies    Labs:not applicable  Imaging: No results found  Review of Systems:  Review of Systems   All other systems reviewed and are negative  Physical Exam:  /64 (BP Location: Right arm, Patient Position: Sitting, Cuff Size: Standard)   Pulse 67   Ht 5' 4" (1 626 m)   Wt 77 4 kg (170 lb 9 6 oz)   BMI 29 28 kg/m²   Physical Exam  Vitals reviewed  Constitutional:       Appearance: He is well-developed  HENT:      Head: Normocephalic and atraumatic  Eyes:      Conjunctiva/sclera: Conjunctivae normal       Pupils: Pupils are equal, round, and reactive to light  Cardiovascular:      Rate and Rhythm: Normal rate  Heart sounds: Normal heart sounds  Pulmonary:      Effort: Pulmonary effort is normal       Breath sounds: Normal breath sounds  Musculoskeletal:      Cervical back: Normal range of motion and neck supple  Skin:     General: Skin is warm and dry  Neurological:      Mental Status: He is alert and oriented to person, place, and time  Discussion/Summary:I will continue the patient's present medical regimen  Patient appears well compensated  I have asked the patient to call if there is a problem in the interim otherwise I will see the patient in one years time

## 2022-11-22 ENCOUNTER — OFFICE VISIT (OUTPATIENT)
Dept: PHYSICAL THERAPY | Facility: REHABILITATION | Age: 72
End: 2022-11-22

## 2022-11-22 DIAGNOSIS — R25.2 CRAMPING OF FEET: Primary | ICD-10-CM

## 2022-11-22 NOTE — PROGRESS NOTES
PT Evaluation     Today's date: 2022  Patient name: Terrell Marina  : 1950  MRN: [id number]  Referring provider: Shira Ortega PT  Dx:   Encounter Diagnosis     ICD-10-CM    1  Cramping of feet  R25 2           Start Time: 3460  Stop Time: 1830  Total time in clinic (min): 45 minutes    Assessment  Assessment details: 72 y/o male presents with c/o b/l foot pain and cramping  He noticed the cramping increases when he points his foot away from him or pulls his foot towards him  Also, he experiences pain in his feet which is worse when standing or after activity  He feels like his left foot is more flat than the right  He denies any red flag sx's     Impairments: abnormal gait, abnormal muscle tone, abnormal or restricted ROM and lacks appropriate home exercise program    Symptom irritability: highUnderstanding of Dx/Px/POC: good   Prognosis: good    Goals  ST - I with HEP  2 - improve toe flexion rom  LT - ambulate on uneven terrain without issue  2 - participate in recreation (swimming) without issue/sx's  3 - go up/down stairs step-over-step without issue    Plan  Patient would benefit from: skilled physical therapy  Planned therapy interventions: joint mobilization, activity modification, manual therapy, neuromuscular re-education, patient education, home exercise program, strengthening, stretching, therapeutic activities and therapeutic exercise  Duration in visits: 8  Treatment plan discussed with: patient        Subjective Evaluation    Quality of life: good    Pain  At worst pain ratin  Quality: sharp  Relieving factors: change in position and rest  Aggravating factors: standing, walking, stair climbing and running    Social Support  Stairs in house: yes   Lives in: multiple-level home  Lives with: spouse      Diagnostic Tests  No diagnostic tests performed  Treatments  No previous or current treatments  Patient Goals  Patient goals for therapy: decreased pain, improved balance, increased motion, increased strength, independence with ADLs/IADLs and return to sport/leisure activities          Objective     Neurological Testing     Sensation     Ankle/Foot   Left Ankle/Foot   Intact: pin prick    Right Ankle/Foot   Intact: pin prick     Reflexes   Left   Patellar (L4): normal (2+)    Right   Patellar (L4): normal (2+)    Passive Range of Motion     Additional Passive Range of Motion Details  Mild-mod limitations with toe extension rom  Sig limitations with toe flexion rom    Joint Play   Left Ankle/Foot  Hypomobile in the talocrural joint  Right Ankle/Foot  Hypomobile in the talocrural joint  Strength/Myotome Testing     Left Ankle/Foot   Dorsiflexion: 4+  Great toe extension: 4+    Right Ankle/Foot   Dorsiflexion: 4+  Great toe extension: 4+    General Comments:       Ankle/Foot Comments   B/l Knee arom = wnl    Standing balance:  FA EO = wnl  FA EC = mild sway = wnl  FT EO = mild sway = wnl  FT EC = mod sway = wnl    ARIELLE = wnl    Coordination: (heel taps from toe to shin) = wnl    Convergence = wnl                 Precautions: HTN      Manuals 11/22            Toe PROM - focus on flexion LMR            Toe spreading LMR            Calf smash LMR                                      Ankle gapping - TCJ -  Gr 2 - LMR            Neuro Re-Ed 11/22                                                                                                       Ther Ex 11/22            Pro stretch 15"x2 b/l            HEP LMR                                                                                          Ther Activity                                       Gait Training                                       Modalities

## 2022-11-23 ENCOUNTER — OFFICE VISIT (OUTPATIENT)
Dept: CARDIOLOGY CLINIC | Facility: CLINIC | Age: 72
End: 2022-11-23

## 2022-11-23 VITALS
HEART RATE: 67 BPM | BODY MASS INDEX: 29.12 KG/M2 | HEIGHT: 64 IN | DIASTOLIC BLOOD PRESSURE: 64 MMHG | SYSTOLIC BLOOD PRESSURE: 120 MMHG | WEIGHT: 170.6 LBS

## 2022-11-23 DIAGNOSIS — E78.00 PURE HYPERCHOLESTEROLEMIA: ICD-10-CM

## 2022-11-23 DIAGNOSIS — I10 ESSENTIAL (PRIMARY) HYPERTENSION: Primary | ICD-10-CM

## 2022-11-23 DIAGNOSIS — I25.10 CORONARY ARTERIOSCLEROSIS: ICD-10-CM

## 2022-11-23 RX ORDER — DIPHENHYDRAMINE HCL 25 MG
25 CAPSULE ORAL EVERY 6 HOURS PRN
COMMUNITY

## 2022-11-28 ENCOUNTER — OFFICE VISIT (OUTPATIENT)
Dept: PHYSICAL THERAPY | Facility: REHABILITATION | Age: 72
End: 2022-11-28

## 2022-11-28 DIAGNOSIS — R25.2 CRAMPING OF FEET: Primary | ICD-10-CM

## 2022-11-28 NOTE — PROGRESS NOTES
Daily Note     Today's date: 2022  Patient name: Terrell Marina  : 1950  MRN: [id number]  Referring provider: Shira Ortega, PT  Dx:   Encounter Diagnosis     ICD-10-CM    1  Cramping of feet  R25 2           Start Time: 1640  Stop Time: 7798  Total time in clinic (min): 45 minutes    Subjective: No new c/o's  Objective: See treatment diary below  HEP updated with toe-ga  Assessment: Tolerated treatment well  Patient would benefit from continued PT    Plan: Continue per plan of care        Precautions: HTN      Manuals            Toe PROM - focus on flexion LMR LMR           Toe spreading LMR LMR           Calf smash LMR LMR                                     Ankle gapping - TCJ -  Gr 2 - LMR            Neuro Re-Ed                         Tandem balance - half foam  2' ea           Balance - diag rockerboard  1' ea                                                               Ther Ex            Pro stretch 15"x2 b/l 15"x3           HEP LMR LMR - add toe-ga           Calf stretch - vibration  1'x2           HR on foam  2x9           Ankle PROM  LMR                                                  Ther Activity                                       Gait Training                                       Modalities

## 2022-12-06 ENCOUNTER — OFFICE VISIT (OUTPATIENT)
Dept: PHYSICAL THERAPY | Facility: REHABILITATION | Age: 72
End: 2022-12-06

## 2022-12-06 DIAGNOSIS — R25.2 CRAMPING OF FEET: Primary | ICD-10-CM

## 2022-12-06 NOTE — PROGRESS NOTES
Daily Note     Today's date: 2022  Patient name: Ollie Hutchinson  : 1950  MRN: [id number]  Referring provider: Gabriel Ramon PT  Dx:   Encounter Diagnosis     ICD-10-CM    1  Cramping of feet  R25 2           Start Time: 0900  Stop Time: 0940  Total time in clinic (min): 40 minutes    Subjective: Pt started using the toe spacers  He has used them three times so far  He is already noticing improvements  Objective: See treatment diary below    Assessment: Tolerated treatment well  Patient would benefit from continued PT    Plan: Continue per plan of care        Precautions: HTN      Manuals           Toe PROM - focus on flexion LMR LMR LMR          Toe spreading LMR LMR           Calf smash LMR LMR LMR          Prone hip ir/er prom   LMR          Prone quad stretch   LMR          Ankle gapping - TCJ -  Gr 2 - LMR  Gr 4 - LMR          Neuro Re-Ed                        Tandem balance - half foam  2' ea           Balance - diag rockerboard  1' ea                                                               Ther Ex           Pro stretch 15"x2 b/l 15"x3           HEP LMR LMR - add toe-ga           Calf stretch - vibration  1'x2           HR on foam  2x9           Ankle PROM  LMR           Tandem balance - half foam   2' ea          Slant board - calf stretch   3'          Kneeling ankle PF stretch   15"x2 b/l                                    Ther Activity                                       Gait Training                                       Modalities

## 2022-12-08 ENCOUNTER — APPOINTMENT (OUTPATIENT)
Dept: PHYSICAL THERAPY | Facility: REHABILITATION | Age: 72
End: 2022-12-08

## 2022-12-15 ENCOUNTER — OFFICE VISIT (OUTPATIENT)
Dept: PHYSICAL THERAPY | Facility: REHABILITATION | Age: 72
End: 2022-12-15

## 2022-12-15 DIAGNOSIS — R25.2 CRAMPING OF FEET: Primary | ICD-10-CM

## 2022-12-15 NOTE — PROGRESS NOTES
Daily Note     Today's date: 12/15/2022  Patient name: Nevaeh Chatman  : 1950  MRN: [id number]  Referring provider: Vic Aguilar PT  Dx:   Encounter Diagnosis     ICD-10-CM    1  Cramping of feet  R25 2           Start Time: 845  Stop Time:   Total time in clinic (min): 30 minutes    Subjective: Pt has been wearing the toe spacers  He feels he has a harder time spreading the toes on the right foot  Objective: See treatment diary below  HEP updated with towel crunches  Assessment: Tolerated treatment well  Patient demonstrated fatigue post treatment    Plan: Continue per plan of care        Precautions: HTN      Manuals 11/22 11/28 12/06 12/15         Toe PROM - focus on flexion LMR LMR LMR          Toe spreading LMR LMR           Calf smash LMR LMR LMR          Prone hip ir/er prom   LMR          Prone quad stretch   LMR          Ankle gapping - TCJ -  Gr 2 - LMR  Gr 4 - LMR          Neuro Re-Ed 11/22 11/28 12/06 12/15                      Tandem balance - half foam  2' ea           Balance - diag rockerboard  1' ea           Forefoot HR with passive toe spreading    3x7 b/l         [place] pickup    x1 round each         Arch formation with manual feedback    15"x3 ea                      Ther Ex 11/22 11/28 12/06 12/15         Pro stretch 15"x2 b/l 15"x3           HEP LMR LMR - add toe-ga  LMR         Calf stretch - vibration  1'x2  1'x2         HR on foam  2x9           Ankle PROM  LMR           Tandem balance - half foam   2' ea          Slant board - calf stretch   3'          Kneeling ankle PF stretch   15"x2 b/l          Towel crunches    x15 ea                      Ther Activity                                       Gait Training                                       Modalities

## 2022-12-23 ENCOUNTER — HOSPITAL ENCOUNTER (OUTPATIENT)
Dept: RADIOLOGY | Facility: HOSPITAL | Age: 72
Discharge: HOME | End: 2022-12-23

## 2022-12-23 DIAGNOSIS — R05.3 COVID-19 LONG HAULER MANIFESTING CHRONIC COUGH: ICD-10-CM

## 2022-12-23 DIAGNOSIS — U09.9 COVID-19 LONG HAULER MANIFESTING CHRONIC COUGH: ICD-10-CM

## 2022-12-27 DIAGNOSIS — I10 ESSENTIAL (PRIMARY) HYPERTENSION: ICD-10-CM

## 2022-12-29 ENCOUNTER — OFFICE VISIT (OUTPATIENT)
Dept: PHYSICAL THERAPY | Facility: REHABILITATION | Age: 72
End: 2022-12-29

## 2022-12-29 DIAGNOSIS — R25.2 CRAMPING OF FEET: Primary | ICD-10-CM

## 2022-12-29 NOTE — PROGRESS NOTES
Daily Note     Today's date: 2022  Patient name: Dione Slaughter  : 1950  MRN: [id number]  Referring provider: Tyron Walker PT  Dx:   Encounter Diagnosis     ICD-10-CM    1  Cramping of feet  R25 2           Start Time: 815  Stop Time:   Total time in clinic (min): 30 minutes    Subjective: Pt is pleased with his progress  He is now able to wear the toe spacers for 90 minutes and occasionally performs his exercises with them on  He still can not lift his big toe, but he can spread the other toes  He reports less cramping in his feet  Objective: See treatment diary below  Reviewed HEP  FOTO goal met  Assessment: Tolerated treatment well   Patient exhibited good technique with therapeutic exercises    Plan: d/c to ongoing hep     Precautions: HTN      Manuals 11/22 11/28 12/06 12/15 12/29        Toe PROM - focus on flexion LMR LMR LMR          Toe spreading LMR LMR   LMR        Calf smash LMR LMR LMR  LMR - deep        Sustained pressure calf belly with ankle pumping     LMR                                  Prone hip ir/er prom   LMR          Prone quad stretch   LMR          Ankle gapping - TCJ -  Gr 2 - LMR  Gr 4 - LMR  Gr 4 - LMR        Neuro Re-Ed 11/22 11/28 12/06 12/15 12/29                     Tandem balance - half foam  2' ea           Balance - diag rockerboard  1' ea           Forefoot HR with passive toe spreading    3x7 b/l         [place] pickup    x1 round each         Arch formation with manual feedback    15"x3 ea                      Ther Ex 11/22 11/28 12/06 12/15 12/29        Pro stretch 15"x2 b/l 15"x3           HEP LMR LMR - add toe-ga  LMR LMR - review        Calf stretch - vibration  1'x2  1'x2         HR on foam  2x9           Ankle PROM  LMR           Tandem balance - half foam   2' ea          Slant board - calf stretch   3'          Kneeling ankle PF stretch   15"x2 b/l          Towel crunches    x15 ea                      Ther Activity Gait Training                                       Modalities

## 2023-02-13 DIAGNOSIS — N40.1 BPH WITH OBSTRUCTION/LOWER URINARY TRACT SYMPTOMS: Primary | ICD-10-CM

## 2023-02-13 DIAGNOSIS — N13.8 BPH WITH OBSTRUCTION/LOWER URINARY TRACT SYMPTOMS: Primary | ICD-10-CM

## 2023-03-23 DIAGNOSIS — N40.1 BPH WITH OBSTRUCTION/LOWER URINARY TRACT SYMPTOMS: ICD-10-CM

## 2023-03-23 DIAGNOSIS — N13.8 BPH WITH OBSTRUCTION/LOWER URINARY TRACT SYMPTOMS: ICD-10-CM

## 2023-03-24 RX ORDER — TAMSULOSIN HYDROCHLORIDE 0.4 MG/1
CAPSULE ORAL
Qty: 90 CAPSULE | Refills: 3 | Status: SHIPPED | OUTPATIENT
Start: 2023-03-24 | End: 2023-03-30

## 2023-03-30 DIAGNOSIS — N13.8 BPH WITH OBSTRUCTION/LOWER URINARY TRACT SYMPTOMS: ICD-10-CM

## 2023-03-30 DIAGNOSIS — N40.1 BPH WITH OBSTRUCTION/LOWER URINARY TRACT SYMPTOMS: ICD-10-CM

## 2023-03-30 RX ORDER — TAMSULOSIN HYDROCHLORIDE 0.4 MG/1
CAPSULE ORAL
Qty: 90 CAPSULE | Refills: 3 | Status: SHIPPED | OUTPATIENT
Start: 2023-03-30

## 2023-03-31 ENCOUNTER — APPOINTMENT (OUTPATIENT)
Dept: LAB | Facility: CLINIC | Age: 73
End: 2023-03-31

## 2023-03-31 DIAGNOSIS — N13.8 BPH WITH OBSTRUCTION/LOWER URINARY TRACT SYMPTOMS: ICD-10-CM

## 2023-03-31 DIAGNOSIS — N40.1 BPH WITH OBSTRUCTION/LOWER URINARY TRACT SYMPTOMS: ICD-10-CM

## 2023-03-31 DIAGNOSIS — I10 ESSENTIAL (PRIMARY) HYPERTENSION: ICD-10-CM

## 2023-03-31 LAB — PSA SERPL-MCNC: 1.3 NG/ML (ref 0–4)

## 2023-03-31 RX ORDER — SIMVASTATIN 40 MG
TABLET ORAL
Qty: 90 TABLET | Refills: 3 | Status: SHIPPED | OUTPATIENT
Start: 2023-03-31

## 2023-07-10 ENCOUNTER — APPOINTMENT (OUTPATIENT)
Dept: LAB | Facility: CLINIC | Age: 73
End: 2023-07-10
Payer: COMMERCIAL

## 2023-07-10 ENCOUNTER — TRANSCRIBE ORDERS (OUTPATIENT)
Dept: LAB | Facility: CLINIC | Age: 73
End: 2023-07-10

## 2023-07-10 DIAGNOSIS — R19.7 DIARRHEA, UNSPECIFIED TYPE: ICD-10-CM

## 2023-07-10 DIAGNOSIS — R19.7 DIARRHEA, UNSPECIFIED TYPE: Primary | ICD-10-CM

## 2023-07-10 PROCEDURE — 89055 LEUKOCYTE ASSESSMENT FECAL: CPT

## 2023-07-10 PROCEDURE — 87505 NFCT AGENT DETECTION GI: CPT

## 2023-07-11 LAB
CAMPYLOBACTER DNA SPEC NAA+PROBE: NORMAL
SALMONELLA DNA SPEC QL NAA+PROBE: NORMAL
SHIGA TOXIN STX GENE SPEC NAA+PROBE: NORMAL
SHIGELLA DNA SPEC QL NAA+PROBE: NORMAL

## 2023-07-14 DIAGNOSIS — I10 ESSENTIAL (PRIMARY) HYPERTENSION: ICD-10-CM

## 2023-07-18 LAB — WBC SPEC QL GRAM STN: NORMAL

## 2023-09-27 ENCOUNTER — TELEPHONE (OUTPATIENT)
Age: 73
End: 2023-09-27

## 2023-09-27 NOTE — TELEPHONE ENCOUNTER
Patient last seen on 4/4/23 with Shakira Hamilton in [place] ([place]). Patient requesting to speak to Shakira Hamilton. He stated he has medication questions and will only take 10 minutes.     Patient requesting a call back at [phone]

## 2023-10-03 DIAGNOSIS — N52.8 OTHER MALE ERECTILE DYSFUNCTION: Primary | ICD-10-CM

## 2023-10-03 RX ORDER — TADALAFIL 20 MG/1
20 TABLET ORAL DAILY PRN
Qty: 30 TABLET | Refills: 3 | Status: SHIPPED | OUTPATIENT
Start: 2023-10-03

## 2023-11-01 ENCOUNTER — HOSPITAL ENCOUNTER (OUTPATIENT)
Dept: RADIOLOGY | Facility: HOSPITAL | Age: 73
Discharge: HOME | End: 2023-11-01
Payer: COMMERCIAL

## 2023-11-01 DIAGNOSIS — C81.95: ICD-10-CM

## 2023-11-01 DIAGNOSIS — C85.99 NON-HODGKIN LYMPHOMA, UNSPECIFIED, EXTRANODAL AND SOLID ORGAN SITES (HCC): ICD-10-CM

## 2023-11-01 PROCEDURE — 74177 CT ABD & PELVIS W/CONTRAST: CPT

## 2023-11-01 PROCEDURE — 71260 CT THORAX DX C+: CPT

## 2023-11-01 RX ADMIN — IOHEXOL 100 ML: 350 INJECTION, SOLUTION INTRAVENOUS at 10:45

## 2023-11-17 NOTE — PROGRESS NOTES
Occupational Therapy    Precautions:  Medical precautions:  fall risk; standard precautions.  DNR  Lines:     Basic: telemetry and capped IV       OBJECTIVE                      Therapy procedure time and total treatment time can be found documented on the Time Entry flowsheet   Cardiology Follow Up    [facility]  1950  [id number]  [facility] [id number]  [phone]    1. Essential (primary) hypertension        2. Coronary arteriosclerosis        3. Pure hypercholesterolemia            Interval History:  Patient is here for a f/u visit. He has HTN and CAD with prior PCI. He has history of Hodgkin's disease diagnosed in 2000. Patient does have CAD with multiple stents in place. Patient did bring in a picture and appears that he had multiple lesions in the LCX and LAD/diagonal which were treated. Three stents were placed in the LAD/diagonal system and 2 stents were placed in the LCX. This was done at [facility] in 2012. At that time he had cardiac catheterization and as best he knows he had 5 stents placed in one vessel with good results obtained as I have before and after pictures. . The cardiologist that he saw practices at [facility], Dr. Dorothey Essex. He sees Dr. Carol Ann Guzmán in reference to hiatal hernia. Lipid profile done 4/2023 demonstrated total cholesterol of 90 with an HDL of 34 and a calculated LDL of 35. Echo done 4/2021 showed normal LV systolic function with mild LVH and mild to moderate PI. Nuclear ETT done February 2022 showed no scintigraphic evidence of ischemia. Transient ST segment change was noted which resolved early in recovery. Vital signs are stable today. Patient has had no chest pain or significant dyspnea.      Patient Active Problem List   Diagnosis   • Aftercare following surgery of the musculoskeletal system     Past Medical History:   Diagnosis Date   • Disease of thyroid gland    • GERD (gastroesophageal reflux disease)    • History of chemotherapy    • Hyperlipidemia    • Hypertension    • Lymphoma ([address])      Social History     Socioeconomic History   • Marital status:      Spouse name: Not on file   • Number of children: Not on file   • Years of education: Not on file   • Highest education level: Not on file   Occupational History   • Not on file   Tobacco Use   • Smoking status: Former   • Smokeless tobacco: Never   Vaping Use   • Vaping Use: Never used   Substance and Sexual Activity   • Alcohol use: No   • Drug use: No   • Sexual activity: Not on file   Other Topics Concern   • Not on file   Social History Narrative   • Not on file     Social Determinants of Health     Financial Resource Strain: Not on file   Food Insecurity: Not on file   Transportation Needs: Not on file   Physical Activity: Not on file   Stress: Not on file   Social Connections: Not on file   Intimate Partner Violence: Not on file   Housing Stability: Not on file      Family History   Problem Relation Age of Onset   • No Known Problems Mother    • No Known Problems Father      Past Surgical History:   Procedure Laterality Date   • CORONARY ANGIOPLASTY      4 stents   • MS ARTHRS KNE SURG W/MENISCECTOMY MED/LAT W/SHVG Left 8/24/2018    Procedure: ARTHROSCOPIC PARTIAL  LATERAL MENISCECTOMY;  Surgeon: Johnson Vila MD;  Location: [facility];  Service: Orthopedics       Current Outpatient Medications:   •  ALPRAZolam (XANAX) 0.5 mg tablet, Take 0.5 mg by mouth daily, Disp: , Rfl: 0  •  aspirin 325 mg tablet, Take 1 tablet by mouth daily, Disp: , Rfl:   •  co-enzyme Q-10 30 MG capsule, Take 30 mg by mouth 3 (three) times a day, Disp: , Rfl:   •  ipratropium (ATROVENT) 0.03 % nasal spray, 2 sprays into each nostril every 12 (twelve) hours, Disp: 30 mL, Rfl: 5  •  levothyroxine 175 mcg tablet, Take 1 tablet by mouth daily, Disp: , Rfl:   •  Magnesium 200 MG TABS, Take by mouth, Disp: , Rfl:   •  melatonin 3 mg, Take by mouth, Disp: , Rfl:   •  metoprolol tartrate (LOPRESSOR) 25 mg tablet, TAKE 1 TABLET TWICE A DAY, Disp: 90 tablet, Rfl: 7  •  Multiple Vitamin tablet, Take 1 tablet by mouth daily, Disp: , Rfl:   •  pantoprazole (PROTONIX) 40 mg tablet, Take 1 tablet by mouth 2 (two) times a day  , Disp: , Rfl:   •  Potassium 99 MG TABS, Take by mouth, Disp: , Rfl:   •  simvastatin (ZOCOR) 40 mg tablet, TAKE 1 TABLET DAILY, Disp: 90 tablet, Rfl: 3  •  tadalafil (CIALIS) 20 MG tablet, Take 1 tablet (20 mg total) by mouth daily as needed for erectile dysfunction, Disp: 30 tablet, Rfl: 3  •  tamsulosin (FLOMAX) 0.4 mg, take 1 capsule daily WITH DINNER, Disp: 90 capsule, Rfl: 3  No Known Allergies    Labs:not applicable  Imaging: CT chest abdomen pelvis w contrast    Result Date: 11/9/2023  Narrative: CT CHEST, ABDOMEN AND PELVIS WITH IV CONTRAST INDICATION:   C81.95: Hodgkin lymphoma, unspecified, lymph nodes of inguinal region and lower limb C85.99: Non-Hodgkin lymphoma, unspecified, extranodal and solid organ sites. COMPARISON: CT chest abdomen and pelvis 02/10/2022 TECHNIQUE: CT examination of the chest, abdomen and pelvis was performed. Multiplanar 2D reformatted images were created from the source data. This examination, like all CT scans performed in the [facility], was performed utilizing techniques to minimize radiation dose exposure, including the use of iterative reconstruction and automated exposure control. Radiation dose length product (DLP) for this visit:  689.21 mGy-cm IV Contrast:  100 mL of iohexol (OMNIPAQUE) Enteric Contrast: Enteric contrast was administered. FINDINGS: CHEST LUNGS: Pulmonary nodules as below: - New 4 mm right lower lobe solid nodule (series 3, image 187). - 4 mm right lower lobe solid nodule (series 3, image 150) stable since CT 6/30/2020. - 2 mm right upper lobe solid nodule (series 3, image 73) stable since 6/30/2020. - 2 mm left upper lobe solid nodule (series 3, image 45) stable since 3/22/2021. Mild to moderate emphysematous changes with upper lobe predominance. No consolidation. Stable left basilar mild linear scarring. PLEURA:  Unremarkable.  HEART/GREAT VESSELS: Heavy atherosclerotic coronary artery calcification. Heart is otherwise unremarkable. No thoracic aortic aneurysm. MEDIASTINUM AND PADMINI:  Unremarkable. CHEST WALL AND LOWER NECK:  Unremarkable. ABDOMEN LIVER/BILIARY TREE:  Unremarkable. GALLBLADDER:  No calcified gallstones. No pericholecystic inflammatory change. SPLEEN: Few subcentimeter well-circumscribed hypodensities, too small to characterize, but most likely representing benign findings. PANCREAS:  Unremarkable. ADRENAL GLANDS:  Unremarkable. KIDNEYS/URETERS: Bilateral simple renal cysts with few additional subcentimeter renal hypodensities, too small to characterize, but most likely representing benign findings. And intermediate density 3.8 cm exophytic lesion in the left kidney midportion (series 2 image 140) measured greater than 70 Hounsfield units on prior unenhanced CT 8/6/2012, stable in size from prior CT 2/10/2022, in keeping with a proteinaceous cyst. No hydronephrosis. STOMACH AND BOWEL: Small hiatal hernia. Colonic diverticulosis without evidence of acute diverticulitis. APPENDIX: Surgically absent. ABDOMINOPELVIC CAVITY:  No ascites. No pneumoperitoneum. No lymphadenopathy. VESSELS: Heavy atherosclerotic changes of the abdominal aorta. No aneurysm. PELVIS REPRODUCTIVE ORGANS:  The prostate is mildly enlarged. URINARY BLADDER:  Unremarkable. ABDOMINAL WALL/INGUINAL REGIONS: Small fat containing right inguinal hernia noted. OSSEOUS STRUCTURES:  No acute fracture or destructive osseous lesion. Spinal degenerative changes. Impression: 1. New 4 mm right lower lobe solid pulmonary nodule. The other 4 mm and smaller nodules are stable from prior. Based on current Fleischner Society 2017 Guidelines on incidental pulmonary nodule, patients with a known malignancy are at increased risk of metastasis and should receive initial three month follow-up chest CT. 2. No findings of recurrent lymphoma in the abdomen or pelvis.  The study was marked in [facility] and the [facility] for significant notification. Resident: Den Rosales, the attending radiologist, have reviewed the images and agree with the final report above. Workstation performed: [id number]       Review of Systems:  Review of Systems   All other systems reviewed and are negative. Physical Exam:  /56 (BP Location: Left arm, Patient Position: Sitting, Cuff Size: Standard)   Pulse 60   Ht 5' 4" (1.626 m)   Wt 73 kg (161 lb)   SpO2 95%   BMI 27.64 kg/m²   Physical Exam  Vitals reviewed. Constitutional:       Appearance: He is well-developed. HENT:      Head: Normocephalic and atraumatic. Cardiovascular:      Rate and Rhythm: Normal rate. Heart sounds: Normal heart sounds. Pulmonary:      Effort: Pulmonary effort is normal.      Breath sounds: Normal breath sounds. Musculoskeletal:      Cervical back: Normal range of motion. Skin:     General: Skin is warm and dry. Neurological:      Mental Status: He is alert and oriented to person, place, and time. Discussion/Summary:I will continue the patient's present medical regimen. Patient appears well compensated. I have asked the patient to call if there is a problem in the interim otherwise I will see the patient in one years time.

## 2023-11-27 ENCOUNTER — OFFICE VISIT (OUTPATIENT)
Dept: CARDIOLOGY CLINIC | Facility: CLINIC | Age: 73
End: 2023-11-27
Payer: COMMERCIAL

## 2023-11-27 VITALS
OXYGEN SATURATION: 95 % | HEART RATE: 60 BPM | SYSTOLIC BLOOD PRESSURE: 118 MMHG | WEIGHT: 161 LBS | BODY MASS INDEX: 27.49 KG/M2 | DIASTOLIC BLOOD PRESSURE: 56 MMHG | HEIGHT: 64 IN

## 2023-11-27 DIAGNOSIS — I10 ESSENTIAL (PRIMARY) HYPERTENSION: Primary | ICD-10-CM

## 2023-11-27 DIAGNOSIS — E78.00 PURE HYPERCHOLESTEROLEMIA: ICD-10-CM

## 2023-11-27 DIAGNOSIS — I25.10 CORONARY ARTERIOSCLEROSIS: ICD-10-CM

## 2023-11-27 PROCEDURE — 99214 OFFICE O/P EST MOD 30 MIN: CPT | Performed by: INTERNAL MEDICINE

## 2024-04-19 DIAGNOSIS — I10 ESSENTIAL (PRIMARY) HYPERTENSION: ICD-10-CM

## 2024-04-19 RX ORDER — SIMVASTATIN 40 MG
TABLET ORAL
Qty: 30 TABLET | Refills: 0 | Status: SHIPPED | OUTPATIENT
Start: 2024-04-19

## 2024-05-02 ENCOUNTER — HOSPITAL ENCOUNTER (OUTPATIENT)
Dept: RADIOLOGY | Facility: HOSPITAL | Age: 74
Discharge: HOME | End: 2024-05-02
Payer: COMMERCIAL

## 2024-05-02 ENCOUNTER — APPOINTMENT (OUTPATIENT)
Dept: LAB | Facility: CLINIC | Age: 74
End: 2024-05-02
Payer: COMMERCIAL

## 2024-05-02 DIAGNOSIS — S42.001A CLOSED NONDISPLACED FRACTURE OF RIGHT CLAVICLE, UNSPECIFIED PART OF CLAVICLE, INITIAL ENCOUNTER: ICD-10-CM

## 2024-05-02 PROCEDURE — 73030 X-RAY EXAM OF SHOULDER: CPT

## 2024-05-02 PROCEDURE — 73000 X-RAY EXAM OF COLLAR BONE: CPT

## 2024-05-23 ENCOUNTER — HOSPITAL ENCOUNTER (OUTPATIENT)
Dept: CT IMAGING | Facility: HOSPITAL | Age: 74
Discharge: HOME | End: 2024-05-23
Payer: COMMERCIAL

## 2024-05-23 DIAGNOSIS — C83.38 RETICULOSARCOMA OF LYMPH NODES OF MULTIPLE SITES (HCC): ICD-10-CM

## 2024-05-23 PROCEDURE — 71260 CT THORAX DX C+: CPT

## 2024-05-23 RX ADMIN — IOHEXOL 85 ML: 350 INJECTION, SOLUTION INTRAVENOUS at 14:35

## 2024-05-27 DIAGNOSIS — I10 ESSENTIAL (PRIMARY) HYPERTENSION: ICD-10-CM

## 2024-05-28 RX ORDER — SIMVASTATIN 40 MG
TABLET ORAL
Qty: 90 TABLET | Refills: 0 | Status: SHIPPED | OUTPATIENT
Start: 2024-05-28

## 2024-07-11 DIAGNOSIS — N40.1 BPH WITH OBSTRUCTION/LOWER URINARY TRACT SYMPTOMS: ICD-10-CM

## 2024-07-11 DIAGNOSIS — N13.8 BPH WITH OBSTRUCTION/LOWER URINARY TRACT SYMPTOMS: ICD-10-CM

## 2024-07-12 RX ORDER — TAMSULOSIN HYDROCHLORIDE 0.4 MG/1
CAPSULE ORAL
Qty: 90 CAPSULE | Refills: 3 | Status: SHIPPED | OUTPATIENT
Start: 2024-07-12

## 2024-07-13 DIAGNOSIS — I10 ESSENTIAL (PRIMARY) HYPERTENSION: ICD-10-CM

## 2024-08-13 DIAGNOSIS — I10 ESSENTIAL (PRIMARY) HYPERTENSION: ICD-10-CM

## 2024-08-14 RX ORDER — SIMVASTATIN 40 MG
TABLET ORAL
Qty: 90 TABLET | Refills: 3 | Status: SHIPPED | OUTPATIENT
Start: 2024-08-14

## 2024-09-27 ENCOUNTER — DOCUMENTATION (OUTPATIENT)
Dept: HEMATOLOGY ONCOLOGY | Facility: CLINIC | Age: 74
End: 2024-09-27

## 2024-09-27 NOTE — PROGRESS NOTES
Self referral for hx of lymphoma. Last seen by Dr. Melendez 2012. Patient was since seen by Dr. Tiff Bro at [place] and is on surveillance   WIll send for scheduling.

## 2024-10-11 ENCOUNTER — APPOINTMENT (OUTPATIENT)
Dept: LAB | Facility: CLINIC | Age: 74
End: 2024-10-11
Payer: COMMERCIAL

## 2024-10-11 ENCOUNTER — TRANSCRIBE ORDERS (OUTPATIENT)
Dept: LAB | Facility: CLINIC | Age: 74
End: 2024-10-11

## 2024-10-11 DIAGNOSIS — E55.9 AVITAMINOSIS D: Primary | ICD-10-CM

## 2024-10-11 DIAGNOSIS — I10 ESSENTIAL HYPERTENSION, MALIGNANT: ICD-10-CM

## 2024-10-11 DIAGNOSIS — E78.5 HYPERLIPIDEMIA, UNSPECIFIED HYPERLIPIDEMIA TYPE: ICD-10-CM

## 2024-10-11 DIAGNOSIS — E55.9 AVITAMINOSIS D: ICD-10-CM

## 2024-10-11 LAB
25(OH)D3 SERPL-MCNC: 57.8 NG/ML (ref 30–100)
ALBUMIN SERPL BCG-MCNC: 4.5 G/DL (ref 3.5–5)
ALP SERPL-CCNC: 53 U/L (ref 34–104)
ALT SERPL W P-5'-P-CCNC: 30 U/L (ref 7–52)
ANION GAP SERPL CALCULATED.3IONS-SCNC: 9 MMOL/L (ref 4–13)
AST SERPL W P-5'-P-CCNC: 32 U/L (ref 13–39)
BILIRUB SERPL-MCNC: 0.94 MG/DL (ref 0.2–1)
BUN SERPL-MCNC: 19 MG/DL (ref 5–25)
CALCIUM SERPL-MCNC: 9.1 MG/DL (ref 8.4–10.2)
CHLORIDE SERPL-SCNC: 102 MMOL/L (ref 96–108)
CHOLEST SERPL-MCNC: 114 MG/DL
CO2 SERPL-SCNC: 30 MMOL/L (ref 21–32)
CREAT SERPL-MCNC: 0.82 MG/DL (ref 0.6–1.3)
GFR SERPL CREATININE-BSD FRML MDRD: 87 ML/MIN/1.73SQ M
GLUCOSE P FAST SERPL-MCNC: 91 MG/DL (ref 65–99)
HDLC SERPL-MCNC: 33 MG/DL
LDLC SERPL CALC-MCNC: 53 MG/DL (ref 0–100)
NONHDLC SERPL-MCNC: 81 MG/DL
POTASSIUM SERPL-SCNC: 4.3 MMOL/L (ref 3.5–5.3)
PROT SERPL-MCNC: 6.5 G/DL (ref 6.4–8.4)
SODIUM SERPL-SCNC: 141 MMOL/L (ref 135–147)
TRIGL SERPL-MCNC: 139 MG/DL

## 2024-10-11 PROCEDURE — 80053 COMPREHEN METABOLIC PANEL: CPT

## 2024-10-11 PROCEDURE — 82306 VITAMIN D 25 HYDROXY: CPT

## 2024-10-11 PROCEDURE — 36415 COLL VENOUS BLD VENIPUNCTURE: CPT

## 2024-10-11 PROCEDURE — 80061 LIPID PANEL: CPT

## 2024-10-28 ENCOUNTER — TELEPHONE (OUTPATIENT)
Dept: UROLOGY | Facility: CLINIC | Age: 74
End: 2024-10-28

## 2024-10-28 DIAGNOSIS — N52.8 OTHER MALE ERECTILE DYSFUNCTION: ICD-10-CM

## 2024-10-28 RX ORDER — TADALAFIL 20 MG/1
TABLET ORAL
Qty: 30 TABLET | Refills: 3 | OUTPATIENT
Start: 2024-10-28

## 2024-11-01 DIAGNOSIS — N52.8 OTHER MALE ERECTILE DYSFUNCTION: ICD-10-CM

## 2024-11-04 RX ORDER — TADALAFIL 20 MG/1
TABLET ORAL
Qty: 30 TABLET | Refills: 3 | Status: SHIPPED | OUTPATIENT
Start: 2024-11-04

## 2024-11-07 ENCOUNTER — CONSULT (OUTPATIENT)
Dept: HEMATOLOGY ONCOLOGY | Facility: CLINIC | Age: 74
End: 2024-11-07
Payer: COMMERCIAL

## 2024-11-07 VITALS
SYSTOLIC BLOOD PRESSURE: 130 MMHG | DIASTOLIC BLOOD PRESSURE: 84 MMHG | OXYGEN SATURATION: 98 % | TEMPERATURE: 97.4 F | BODY MASS INDEX: 27.83 KG/M2 | WEIGHT: 163 LBS | HEIGHT: 64 IN | HEART RATE: 70 BPM

## 2024-11-07 DIAGNOSIS — R91.8 MULTIPLE LUNG NODULES: ICD-10-CM

## 2024-11-07 DIAGNOSIS — E03.9 ACQUIRED HYPOTHYROIDISM: ICD-10-CM

## 2024-11-07 DIAGNOSIS — E78.2 MIXED HYPERLIPIDEMIA: ICD-10-CM

## 2024-11-07 DIAGNOSIS — C85.90 NON-HODGKIN'S LYMPHOMA, UNSPECIFIED BODY REGION, UNSPECIFIED NON-HODGKIN LYMPHOMA TYPE (HCC): Primary | ICD-10-CM

## 2024-11-07 DIAGNOSIS — I10 PRIMARY HYPERTENSION: ICD-10-CM

## 2024-11-07 DIAGNOSIS — I25.10 ATHEROSCLEROTIC CARDIOVASCULAR DISEASE: ICD-10-CM

## 2024-11-07 DIAGNOSIS — C81.16 NODULAR SCLEROSIS HODGKIN LYMPHOMA OF INTRAPELVIC LYMPH NODES (HCC): ICD-10-CM

## 2024-11-07 PROCEDURE — 99204 OFFICE O/P NEW MOD 45 MIN: CPT | Performed by: INTERNAL MEDICINE

## 2024-11-07 NOTE — PROGRESS NOTES
Hematology / Oncology Outpatient Consult Note    Paul Ellsworth 74 y.o. male [id number]         Date:  11/7/2024    Assessment / Plan:    74-year-old male with past medical history significant for BPH (PSA<4), acquired hypothyroidism [on Synthroid], GERD, hyperlipidemia, hypertension, prior smoker (40 pack year - quit 2008) presents to the clinic to establish care.  In 2000 patient was diagnosed with Hodgkin's lymphoma and was treated with 12 cycles of ABVD [6 months].  He did not receive any surgery or chemotherapy.  Establish care and saw Dr. Gilliam once during that time.  In 2012 he was found to have an ileum mass 1 biopsy indicated non-Hodgkin's lymphoma.  He received treatment at [facility] and underwent R-COPE with maintenance rituximab.  Most recently he has been following up with hematology oncology (Tiff Torres MD) in [place] [facility] [place].  We have been ordering periodic labs and imaging.  Imaging has indicated severe atherosclerotic heart disease.  For which she is following up with cardiology at [facility].  Patient would like to transfer care at [facility] for hematology oncology needs.  CT of the chest abdomen and pelvis show small less than 5 mm pulmonary nodules.  We have ordered imaging and labs  He will follow-up with us every 6 months.      1. 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 Acquired hypothyroidism  - TSH, 3rd generation with Free T4 reflex; Future       Subjective:     HPI:    74-year-old male with past medical history  significant for BPH (PSA<4), acquired hypothyroidism [on Synthroid], GERD, hyperlipidemia, hypertension, prior smoker (40 pack year - quit ) presents to the clinic to establish care.  In  patient was diagnosed with Hodgkin's lymphoma and was treated with 12 cycles of ABVD [6 months].  He did not receive any surgery or chemotherapy.  Establish care and saw Dr. Gilliam once during that time.  In  he was found to have an ileum mass 1 biopsy indicated non-Hodgkin's lymphoma.  He received treatment at [facility] and underwent R-COPE with maintenance rituximab.  Most recently he has been following up with hematology oncology (Tiff Torres MD) in [place].  We have been ordering periodic labs and imaging.  Imaging has indicated severe atherosclerotic heart disease.  For which she is following up with cardiology at [facility].  Patient would like to transfer care at [facility] for hematology oncology needs.  CT of the chest abdomen and pelvis show small less than 5 mm pulmonary nodules.  Patient is asymptomatic.   Family history significant for mother and brother with thyroid cancer.      Interval History:    Patient denies any B symptoms.  He is in good health.  Very active.  ECOG of 0-1      Objective:     Primary Diagnosis:  Year  - HL   Year - NHL    Cancer Staging:  Cancer Staging   No cancer present currently    Previous Hematologic/ Oncologic Treatment:   Year  - HL ABVD (6 months - 12 treatments) (Dr Ralph Mayberry ) - no RT/surgery-- lymphoma was in the groin area.  Year - NHL - [facility] -- R-COPE -- maintaince rituxan -- dite of disease ileum      Current Hematologic/ Oncologic Treatment:    None / Surveillance      Disease Status:   Stable      Test Results:    Pathology:  NA      Radiology:  2024: CT of the chest with contrast indicates no suspicious pulmonary infiltrates. Interval resolution of the  previously noted new 4 mm nodule in the right lower lobe     11/1/2023: CT of the chest abdomen and pelvis with contrast indicated new 4 mm right lower lobe solid nodule. The other 4 mm and smaller nodules are stable from prior. No findings of recurrent lymphoma in the abdomen or pelvis.       Laboratory:  10/11/2024: Serum creatinine 0.82  4/18/2023: TSH 2.3  3/31/2023: PSA 1.3  4/23/2022: Sed rate 2      Physical Exam:      General Appearance:    Alert, oriented        Eyes:    EOMI   Ears:    Normal external ear canals, both ears   Nose:   Nares normal, septum midline   Throat:   Mucosa moist. Pharynx without injection.    Neck:   Supple       Lungs:     Clear to auscultation bilaterally   Chest Wall:    No tenderness or deformity    Heart:    Regular rate and rhythm       Abdomen:     Soft, non-tender, bowel sounds +, no organomegaly           Extremities:   Extremities no cyanosis or edema       Skin:   no rash or icterus.    Lymph nodes:   Cervical, supraclavicular, and axillary nodes normal   Neurologic:   CNII-XII intact         ROS: Review of Systems  - GENERAL: Negative for any nausea, vomiting, fevers, chills, or weight loss.  - HEENT: Negative for any head/Neck trauma, pain, double/blurry vision, sinusitis, rhinitis, nose bleeding.  - CARDIAC: Negative for any chest pain, palpitation, Dyspnea on exertion, peripheral edema.  - PULMONARY: Negative for any SOB, cough, wheezing.   - GASTROINTESTINAL: Negative for any abdominal pain, N/V/D/C, blood in stool.   - GENITOURINARY: Negative for any dysuria, hematuria, incontinence.  - NEUROLOGIC: Negative for any muscle weakness, numbness/tingling, memory changes.    - MUSCULOSKELETAL: Negative for any joint pains/swelling, limited ROM.   - INTEGUMENTARY: Negative for any rashes, cuts/ lesions.  - HEMATOLOGIC: Negative for any abnormal bruising, frequent infections or bleeding.         Imaging: No results found.      Labs:   Lab Results   Component Value Date     "WBC 5.19 04/18/2023    HGB 15.1 04/18/2023    HCT 45.8 04/18/2023    MCV 96 04/18/2023     04/18/2023     Lab Results   Component Value Date    K 4.3 10/11/2024     10/11/2024    CO2 30 10/11/2024    BUN 19 10/11/2024    CREATININE 0.82 10/11/2024    GLUF 91 10/11/2024    CALCIUM 9.1 10/11/2024    AST 32 10/11/2024    ALT 30 10/11/2024    ALKPHOS 53 10/11/2024    EGFR 87 10/11/2024       No components found for: \"CA27.29\"    No components found for: \"\"    No results found for: \"LDH\"    No results found for: \"SPEP\", \"UPEP\"    Lab Results   Component Value Date    PSA 1.3 03/31/2023    PSA 1.1 03/02/2022    PSA 1.3 04/07/2021       No results found for: \"CEA\"    No components found for: \"\"    No results found for: \"\"    No results found for: \"AFP\"    No results found for: \"HCG\"    No results found for: \"IRON\", \"TIBC\", \"FERRITIN\"    No results found for: \"TINWPKMG30\"    No results found for: \"FOLATE\"        Vital Sign:    Body surface area is 1.79 meters squared.    Wt Readings from Last 3 Encounters:   11/07/24 73.9 kg (163 lb)   11/27/23 73 kg (161 lb)   04/14/23 74.4 kg (164 lb)        Temp Readings from Last 3 Encounters:   11/07/24 (!) 97.4 °F (36.3 °C) (Temporal)   02/09/23 (!) 97.2 °F (36.2 °C)   02/22/22 98.3 °F (36.8 °C) (Temporal)        BP Readings from Last 3 Encounters:   11/07/24 130/84   11/27/23 118/56   04/14/23 122/66         Pulse Readings from Last 3 Encounters:   11/07/24 70   11/27/23 60   04/14/23 78     @LASTSAO2(3)@    Active Problems:   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)    Hypothyroidism       Past Medical History:   Past Medical History:   Diagnosis Date    Disease of thyroid gland     GERD (gastroesophageal reflux disease)     History of chemotherapy     Hyperlipidemia     Hypertension     " Lymphoma (HCC)        Surgical History:   Past Surgical History:   Procedure Laterality Date    CORONARY ANGIOPLASTY      4 stents    GA ARTHRS KNE SURG W/MENISCECTOMY MED/LAT W/SHVG Left 8/24/2018    Procedure: ARTHROSCOPIC PARTIAL  LATERAL MENISCECTOMY;  Surgeon: Yonas Baugh MD;  Location: [facility];  Service: Orthopedics       Family History:    Family History   Problem Relation Age of Onset    No Known Problems Mother     No Known Problems Father        Cancer-related family history is not on file.    Social History:   Social History     Socioeconomic History    Marital status:      Spouse name: Not on file    Number of children: Not on file    Years of education: Not on file    Highest education level: Not on file   Occupational History    Not on file   Tobacco Use    Smoking status: Former    Smokeless tobacco: Never   Vaping Use    Vaping status: Never Used   Substance and Sexual Activity    Alcohol use: No    Drug use: No    Sexual activity: Not on file   Other Topics Concern    Not on file   Social History Narrative    Not on file     Social Determinants of Health     Financial Resource Strain: Not on file   Food Insecurity: Not on file   Transportation Needs: Not on file   Physical Activity: Not on file   Stress: Not on file   Social Connections: Not on file   Intimate Partner Violence: Not on file   Housing Stability: Not on file       Current Medications:   Current Outpatient Medications   Medication Sig Dispense Refill    ALPRAZolam (XANAX) 0.5 mg tablet Take 0.5 mg by mouth daily  0    aspirin 325 mg tablet Take 1 tablet by mouth daily      co-enzyme Q-10 30 MG capsule Take 30 mg by mouth 3 (three) times a day      ipratropium (ATROVENT) 0.03 % nasal spray instill 2 sprays into each nostril every 12 hours 30 mL 5    levothyroxine 175 mcg tablet Take 1 tablet by mouth daily      Magnesium 200 MG TABS Take by mouth      melatonin 3 mg Take by mouth      metoprolol tartrate (LOPRESSOR) 25 mg  tablet TAKE 1 TABLET TWICE A  tablet 1    Multiple Vitamin tablet Take 1 tablet by mouth daily      pantoprazole (PROTONIX) 40 mg tablet Take 1 tablet by mouth 2 (two) times a day        Potassium 99 MG TABS Take by mouth      simvastatin (ZOCOR) 40 mg tablet TAKE 1 TABLET DAILY 90 tablet 3    tadalafil (CIALIS) 20 MG tablet take 1 tablet by mouth once daily if needed for ERECTILE DYSFUNCTION 30 tablet 3    tamsulosin (FLOMAX) 0.4 mg take 1 capsule by mouth once daily with dinner 90 capsule 3     No current facility-administered medications for this visit.       Allergies: No Known Allergies

## 2024-11-08 ENCOUNTER — APPOINTMENT (OUTPATIENT)
Dept: LAB | Facility: CLINIC | Age: 74
End: 2024-11-08
Payer: COMMERCIAL

## 2024-11-08 DIAGNOSIS — C85.90 NON-HODGKIN'S LYMPHOMA, UNSPECIFIED BODY REGION, UNSPECIFIED NON-HODGKIN LYMPHOMA TYPE (HCC): ICD-10-CM

## 2024-11-08 DIAGNOSIS — E03.9 ACQUIRED HYPOTHYROIDISM: ICD-10-CM

## 2024-11-08 DIAGNOSIS — C81.16 NODULAR SCLEROSIS HODGKIN LYMPHOMA OF INTRAPELVIC LYMPH NODES (HCC): ICD-10-CM

## 2024-11-08 LAB
BASOPHILS # BLD AUTO: 0.04 THOUSANDS/ÂΜL (ref 0–0.1)
BASOPHILS NFR BLD AUTO: 1 % (ref 0–1)
EOSINOPHIL # BLD AUTO: 0.16 THOUSAND/ÂΜL (ref 0–0.61)
EOSINOPHIL NFR BLD AUTO: 3 % (ref 0–6)
ERYTHROCYTE [DISTWIDTH] IN BLOOD BY AUTOMATED COUNT: 13.6 % (ref 11.6–15.1)
ERYTHROCYTE [SEDIMENTATION RATE] IN BLOOD: 3 MM/HOUR (ref 0–19)
HCT VFR BLD AUTO: 46.9 % (ref 36.5–49.3)
HGB BLD-MCNC: 16 G/DL (ref 12–17)
IMM GRANULOCYTES # BLD AUTO: 0.01 THOUSAND/UL (ref 0–0.2)
IMM GRANULOCYTES NFR BLD AUTO: 0 % (ref 0–2)
LDH SERPL-CCNC: 158 U/L (ref 140–271)
LYMPHOCYTES # BLD AUTO: 0.78 THOUSANDS/ÂΜL (ref 0.6–4.47)
LYMPHOCYTES NFR BLD AUTO: 16 % (ref 14–44)
MCH RBC QN AUTO: 32.7 PG (ref 26.8–34.3)
MCHC RBC AUTO-ENTMCNC: 34.1 G/DL (ref 31.4–37.4)
MCV RBC AUTO: 96 FL (ref 82–98)
MONOCYTES # BLD AUTO: 0.46 THOUSAND/ÂΜL (ref 0.17–1.22)
MONOCYTES NFR BLD AUTO: 10 % (ref 4–12)
NEUTROPHILS # BLD AUTO: 3.41 THOUSANDS/ÂΜL (ref 1.85–7.62)
NEUTS SEG NFR BLD AUTO: 70 % (ref 43–75)
NRBC BLD AUTO-RTO: 0 /100 WBCS
PLATELET # BLD AUTO: 155 THOUSANDS/UL (ref 149–390)
PMV BLD AUTO: 10.4 FL (ref 8.9–12.7)
RBC # BLD AUTO: 4.89 MILLION/UL (ref 3.88–5.62)
TSH SERPL DL<=0.05 MIU/L-ACNC: 3.96 UIU/ML (ref 0.45–4.5)
WBC # BLD AUTO: 4.86 THOUSAND/UL (ref 4.31–10.16)

## 2024-11-08 PROCEDURE — 83615 LACTATE (LD) (LDH) ENZYME: CPT

## 2024-11-08 PROCEDURE — 85025 COMPLETE CBC W/AUTO DIFF WBC: CPT

## 2024-11-08 PROCEDURE — 84443 ASSAY THYROID STIM HORMONE: CPT

## 2024-11-08 PROCEDURE — 85652 RBC SED RATE AUTOMATED: CPT

## 2024-11-08 PROCEDURE — 36415 COLL VENOUS BLD VENIPUNCTURE: CPT

## 2024-11-10 PROBLEM — R91.8 MULTIPLE LUNG NODULES: Status: ACTIVE | Noted: 2024-11-10

## 2024-11-10 PROBLEM — I10 PRIMARY HYPERTENSION: Status: ACTIVE | Noted: 2024-11-10

## 2024-11-10 PROBLEM — E78.2 MIXED HYPERLIPIDEMIA: Status: ACTIVE | Noted: 2024-11-10

## 2024-11-10 PROBLEM — I25.10 ATHEROSCLEROTIC CARDIOVASCULAR DISEASE: Status: ACTIVE | Noted: 2024-11-10

## 2024-11-17 NOTE — PROGRESS NOTES
Cardiology Follow Up    Paul Ellsworth  1950  [id number]  [facility]  [address]  [place] [id number] [phone]    1. Essential (primary) hypertension        2. Coronary arteriosclerosis        3. Pure hypercholesterolemia          Interval History:Patient is here for FU.  He has HTN and CAD with prior PCI. He had Hodgkin's disease diagnosed 2000. Patient does have CAD with multiple stents in place. Patient did bring in a picture and appears that he had multiple lesions in the LCX and LAD/diagonal which were treated.  Three stents were placed in the LAD/diagonal system and 2 stents were placed in the LCX at [facility] 2012. The cardiologist that he saw practices at [facility], Dr. Solomon Paz.  He sees Dr. Flores in reference to .  Lipid profile 10/2024 demonstrated total cholesterol of 114 with an HDL of 33 and a calculated LDL of 53. Echo 4/2021 showed normal LV systolic function with mild LVH and mild to moderate PI. Nuclear ETT 2/2022 showed no scintigraphic evidence of ischemia.  Transient ST segment change was noted which resolved early in recovery.  Vital signs are stable today.  Patient has had no chest pain or significant dyspnea.  His HTN and CAD are stable on his current medicine.    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)    Hypothyroidism    Atherosclerotic cardiovascular disease    Primary hypertension    Mixed hyperlipidemia    Multiple lung nodules     Past Medical History:   Diagnosis Date    Disease of thyroid gland     GERD (gastroesophageal reflux disease)     History of chemotherapy     Hyperlipidemia     Hypertension     Lymphoma (HCC)      Social History     Socioeconomic History    Marital status:       Spouse name: Not on file    Number of children: Not on file    Years of education: Not on file    Highest education level: Not on file   Occupational History    Not on file   Tobacco Use    Smoking status: Former    Smokeless tobacco: Never   Vaping Use    Vaping status: Never Used   Substance and Sexual Activity    Alcohol use: No    Drug use: No    Sexual activity: Not on file   Other Topics Concern    Not on file   Social History Narrative    Not on file     Social Drivers of Health     Financial Resource Strain: Not on file   Food Insecurity: Not on file   Transportation Needs: Not on file   Physical Activity: Not on file   Stress: Not on file   Social Connections: Not on file   Intimate Partner Violence: Not on file   Housing Stability: Not on file      Family History   Problem Relation Age of Onset    No Known Problems Mother     No Known Problems Father      Past Surgical History:   Procedure Laterality Date    CORONARY ANGIOPLASTY      4 stents    LA ARTHRS KNE SURG W/MENISCECTOMY MED/LAT W/SHVG Left 8/24/2018    Procedure: ARTHROSCOPIC PARTIAL  LATERAL MENISCECTOMY;  Surgeon: Yonas Baugh MD;  Location: [facility];  Service: Orthopedics       Current Outpatient Medications:     ALPRAZolam (XANAX) 0.5 mg tablet, Take 0.5 mg by mouth daily, Disp: , Rfl: 0    aspirin 325 mg tablet, Take 1 tablet by mouth daily, Disp: , Rfl:     co-enzyme Q-10 30 MG capsule, Take 30 mg by mouth 3 (three) times a day, Disp: , Rfl:     ipratropium (ATROVENT) 0.03 % nasal spray, instill 2 sprays into each nostril every 12 hours, Disp: 30 mL, Rfl: 5    levothyroxine 175 mcg tablet, Take 1 tablet by mouth daily, Disp: , Rfl:     Magnesium 200 MG TABS, Take by mouth, Disp: , Rfl:     melatonin 3 mg, Take by mouth, Disp: , Rfl:     metoprolol tartrate (LOPRESSOR) 25 mg tablet, TAKE 1 TABLET TWICE A DAY, Disp: 200 tablet, Rfl: 1    Multiple Vitamin tablet, Take 1 tablet by mouth daily, Disp: , Rfl:     pantoprazole  "(PROTONIX) 40 mg tablet, Take 1 tablet by mouth daily, Disp: , Rfl:     Potassium 99 MG TABS, Take by mouth, Disp: , Rfl:     simvastatin (ZOCOR) 40 mg tablet, TAKE 1 TABLET DAILY, Disp: 90 tablet, Rfl: 3    tadalafil (CIALIS) 20 MG tablet, take 1 tablet by mouth once daily if needed for ERECTILE DYSFUNCTION, Disp: 30 tablet, Rfl: 3    tamsulosin (FLOMAX) 0.4 mg, take 1 capsule by mouth once daily with dinner, Disp: 90 capsule, Rfl: 3  No Known Allergies    Labs:not applicable  Imaging: No results found.    Review of Systems:  Review of Systems   All other systems reviewed and are negative.      Physical Exam:  /66 (BP Location: Left arm, Patient Position: Sitting, Cuff Size: Standard)   Pulse 59   Ht 5' 4\" (1.626 m)   Wt 74.4 kg (164 lb)   SpO2 96%   BMI 28.15 kg/m²   Physical Exam  Vitals reviewed.   Constitutional:       Appearance: He is well-developed.   HENT:      Head: Normocephalic and atraumatic.   Cardiovascular:      Rate and Rhythm: Normal rate.      Heart sounds: Normal heart sounds.   Pulmonary:      Effort: Pulmonary effort is normal.      Breath sounds: Normal breath sounds.   Musculoskeletal:      Cervical back: Normal range of motion.   Skin:     General: Skin is warm and dry.   Neurological:      Mental Status: He is alert and oriented to person, place, and time.         Discussion/Summary:I will continue the patient's present medical regimen.  Patient appears well compensated.  I have asked the patient to call if there is a problem in the interim otherwise I will see the patient in one years time.  "

## 2024-11-23 ENCOUNTER — HOSPITAL ENCOUNTER (OUTPATIENT)
Dept: VASCULAR ULTRASOUND | Facility: HOSPITAL | Age: 74
Discharge: HOME | End: 2024-11-23
Attending: INTERNAL MEDICINE
Payer: COMMERCIAL

## 2024-11-23 ENCOUNTER — HOSPITAL ENCOUNTER (OUTPATIENT)
Dept: CT IMAGING | Facility: HOSPITAL | Age: 74
Discharge: HOME | End: 2024-11-23
Attending: INTERNAL MEDICINE
Payer: COMMERCIAL

## 2024-11-23 DIAGNOSIS — G45.1 TRANSIENT ISCHEMIC ATTACK INVOLVING CAROTID ARTERY: ICD-10-CM

## 2024-11-23 PROCEDURE — 93880 EXTRACRANIAL BILAT STUDY: CPT | Performed by: SURGERY

## 2024-11-23 PROCEDURE — 93880 EXTRACRANIAL BILAT STUDY: CPT

## 2024-11-23 PROCEDURE — 70450 CT HEAD/BRAIN W/O DYE: CPT

## 2024-11-25 ENCOUNTER — HOSPITAL ENCOUNTER (OUTPATIENT)
Dept: CT IMAGING | Facility: HOSPITAL | Age: 74
Discharge: HOME | End: 2024-11-25
Attending: INTERNAL MEDICINE
Payer: COMMERCIAL

## 2024-11-25 DIAGNOSIS — C81.16 NODULAR SCLEROSIS HODGKIN LYMPHOMA OF INTRAPELVIC LYMPH NODES (HCC): ICD-10-CM

## 2024-11-25 DIAGNOSIS — C85.90 NON-HODGKIN'S LYMPHOMA, UNSPECIFIED BODY REGION, UNSPECIFIED NON-HODGKIN LYMPHOMA TYPE (HCC): ICD-10-CM

## 2024-11-25 PROCEDURE — 71260 CT THORAX DX C+: CPT

## 2024-11-25 PROCEDURE — 74177 CT ABD & PELVIS W/CONTRAST: CPT

## 2024-11-25 RX ADMIN — IOHEXOL 100 ML: 350 INJECTION, SOLUTION INTRAVENOUS at 12:53

## 2024-11-27 ENCOUNTER — OFFICE VISIT (OUTPATIENT)
Dept: CARDIOLOGY CLINIC | Facility: CLINIC | Age: 74
End: 2024-11-27
Payer: COMMERCIAL

## 2024-11-27 VITALS
HEIGHT: 64 IN | OXYGEN SATURATION: 96 % | WEIGHT: 164 LBS | SYSTOLIC BLOOD PRESSURE: 118 MMHG | BODY MASS INDEX: 28 KG/M2 | DIASTOLIC BLOOD PRESSURE: 66 MMHG | HEART RATE: 59 BPM

## 2024-11-27 DIAGNOSIS — I25.10 CORONARY ARTERIOSCLEROSIS: ICD-10-CM

## 2024-11-27 DIAGNOSIS — I10 ESSENTIAL (PRIMARY) HYPERTENSION: Primary | ICD-10-CM

## 2024-11-27 DIAGNOSIS — E78.00 PURE HYPERCHOLESTEROLEMIA: ICD-10-CM

## 2024-11-27 PROCEDURE — 99214 OFFICE O/P EST MOD 30 MIN: CPT | Performed by: INTERNAL MEDICINE

## 2024-12-30 ENCOUNTER — TELEPHONE (OUTPATIENT)
Age: 74
End: 2024-12-30

## 2024-12-30 NOTE — TELEPHONE ENCOUNTER
Patient was calling to reschedule has follow up. He thought it was with Jhon SCHWARTZ, who he had seen previously. He was not sure who [place] was.     He was able to reschedule for the same day, just a little earlier with Jhon.

## 2025-01-06 DIAGNOSIS — I10 ESSENTIAL (PRIMARY) HYPERTENSION: ICD-10-CM

## 2025-01-07 RX ORDER — METOPROLOL TARTRATE 25 MG/1
25 TABLET, FILM COATED ORAL 2 TIMES DAILY
Qty: 200 TABLET | Refills: 1 | Status: SHIPPED | OUTPATIENT
Start: 2025-01-07

## 2025-01-14 ENCOUNTER — APPOINTMENT (OUTPATIENT)
Dept: LAB | Facility: CLINIC | Age: 75
End: 2025-01-14
Payer: COMMERCIAL

## 2025-01-14 ENCOUNTER — TELEPHONE (OUTPATIENT)
Age: 75
End: 2025-01-14

## 2025-01-14 ENCOUNTER — TRANSCRIBE ORDERS (OUTPATIENT)
Dept: LAB | Facility: CLINIC | Age: 75
End: 2025-01-14

## 2025-01-14 DIAGNOSIS — N13.8 BPH WITH OBSTRUCTION/LOWER URINARY TRACT SYMPTOMS: Primary | ICD-10-CM

## 2025-01-14 DIAGNOSIS — N40.1 BPH WITH OBSTRUCTION/LOWER URINARY TRACT SYMPTOMS: ICD-10-CM

## 2025-01-14 DIAGNOSIS — N40.1 BPH WITH OBSTRUCTION/LOWER URINARY TRACT SYMPTOMS: Primary | ICD-10-CM

## 2025-01-14 DIAGNOSIS — N13.8 BPH WITH OBSTRUCTION/LOWER URINARY TRACT SYMPTOMS: ICD-10-CM

## 2025-01-14 LAB — PSA SERPL-MCNC: 2.6 NG/ML (ref 0–4)

## 2025-01-14 PROCEDURE — 36415 COLL VENOUS BLD VENIPUNCTURE: CPT

## 2025-01-14 PROCEDURE — 84153 ASSAY OF PSA TOTAL: CPT

## 2025-01-21 ENCOUNTER — OFFICE VISIT (OUTPATIENT)
Dept: UROLOGY | Facility: CLINIC | Age: 75
End: 2025-01-21
Payer: COMMERCIAL

## 2025-01-21 VITALS
WEIGHT: 165 LBS | HEART RATE: 64 BPM | HEIGHT: 64 IN | SYSTOLIC BLOOD PRESSURE: 140 MMHG | DIASTOLIC BLOOD PRESSURE: 82 MMHG | OXYGEN SATURATION: 98 % | BODY MASS INDEX: 28.17 KG/M2

## 2025-01-21 DIAGNOSIS — N40.1 BPH WITH OBSTRUCTION/LOWER URINARY TRACT SYMPTOMS: Primary | ICD-10-CM

## 2025-01-21 DIAGNOSIS — N13.8 BPH WITH OBSTRUCTION/LOWER URINARY TRACT SYMPTOMS: Primary | ICD-10-CM

## 2025-01-21 PROCEDURE — 99213 OFFICE O/P EST LOW 20 MIN: CPT | Performed by: PHYSICIAN ASSISTANT

## 2025-01-21 NOTE — PROGRESS NOTES
UROLOGY PROGRESS NOTE   Patient Identifiers: Paul Ellsworth (MRN [id number])  Date of Service: 1/21/2025    Subjective:   74-year-old man history of BPH and kidney stones.  PSA 2.6.  No recent stone exacerbations.  He is fairly comfortable on tamsulosin.    Reason for visit: BPH follow-up    Objective:     VITALS:    Vitals:    01/21/25 0922   BP: 140/82   Pulse: 64   SpO2: 98%           LABS:  Lab Results   Component Value Date    HGB 16.0 11/08/2024    HCT 46.9 11/08/2024    WBC 4.86 11/08/2024     11/08/2024   ]    Lab Results   Component Value Date    K 4.3 10/11/2024     10/11/2024    CO2 30 10/11/2024    BUN 19 10/11/2024    CREATININE 0.82 10/11/2024    CALCIUM 9.1 10/11/2024   ]        INPATIENT MEDS:    Current Outpatient Medications:     ALPRAZolam (XANAX) 0.5 mg tablet, Take 0.5 mg by mouth daily, Disp: , Rfl: 0    aspirin 325 mg tablet, Take 1 tablet by mouth daily, Disp: , Rfl:     co-enzyme Q-10 30 MG capsule, Take 30 mg by mouth 3 (three) times a day, Disp: , Rfl:     ipratropium (ATROVENT) 0.03 % nasal spray, instill 2 sprays into each nostril every 12 hours, Disp: 30 mL, Rfl: 5    levothyroxine 175 mcg tablet, Take 1 tablet by mouth daily, Disp: , Rfl:     Magnesium 200 MG TABS, Take by mouth, Disp: , Rfl:     metoprolol tartrate (LOPRESSOR) 25 mg tablet, TAKE 1 TABLET TWICE A DAY, Disp: 200 tablet, Rfl: 1    Multiple Vitamin tablet, Take 1 tablet by mouth daily, Disp: , Rfl:     pantoprazole (PROTONIX) 40 mg tablet, Take 1 tablet by mouth daily, Disp: , Rfl:     Potassium 99 MG TABS, Take by mouth, Disp: , Rfl:     simvastatin (ZOCOR) 40 mg tablet, TAKE 1 TABLET DAILY, Disp: 90 tablet, Rfl: 3    tadalafil (CIALIS) 20 MG tablet, take 1 tablet by mouth once daily if needed for ERECTILE DYSFUNCTION, Disp: 30 tablet, Rfl: 3    tamsulosin (FLOMAX) 0.4 mg, take 1 capsule by mouth once daily with dinner, Disp: 90 capsule, Rfl: 3    melatonin 3 mg, Take by mouth (Patient not taking:  "Reported on 1/21/2025), Disp: , Rfl:       Physical Exam:   /82 (BP Location: Left arm, Patient Position: Sitting, Cuff Size: Large)   Pulse 64   Ht 5' 4\" (1.626 m)   Wt 74.8 kg (165 lb)   SpO2 98%   BMI 28.32 kg/m²   GEN: no acute distress    RESP: breathing comfortably with no accessory muscle use    ABD: soft, non-tender, non-distended   INCISION:    EXT: no significant peripheral edema   (Male): Penis uncircumcised, phallus normal, meatus patent.  Testicles descended into scrotum bilaterally without masses nor tenderness.  No inguinal hernias bilaterally.  BETTYE: Prostate is enlarged at 35 grams. The prostate is not boggy. The prostate is not tender.  No nodules noted      RADIOLOGY:   IMPRESSION:        1. No evidence for recurrent lymphoma in the chest, abdomen, or pelvis.  2. Stable findings as described above.     Assessment:   1.  BPH    Plan:   -Follow-up in 1 year with PSA prior to visit  -  -  -          "

## 2025-02-12 ENCOUNTER — OFFICE VISIT (OUTPATIENT)
Dept: GASTROENTEROLOGY | Facility: CLINIC | Age: 75
End: 2025-02-12
Payer: COMMERCIAL

## 2025-02-12 VITALS
HEART RATE: 68 BPM | BODY MASS INDEX: 27.49 KG/M2 | WEIGHT: 161 LBS | HEIGHT: 64 IN | DIASTOLIC BLOOD PRESSURE: 62 MMHG | SYSTOLIC BLOOD PRESSURE: 122 MMHG

## 2025-02-12 DIAGNOSIS — K57.31 DIVERTICULOSIS LARGE INTESTINE W/O PERFORATION OR ABSCESS W/BLEEDING: Primary | ICD-10-CM

## 2025-02-12 DIAGNOSIS — K76.0 HEPATIC STEATOSIS: ICD-10-CM

## 2025-02-12 DIAGNOSIS — C85.90 NON-HODGKIN'S LYMPHOMA, UNSPECIFIED BODY REGION, UNSPECIFIED NON-HODGKIN LYMPHOMA TYPE (HCC): ICD-10-CM

## 2025-02-12 DIAGNOSIS — K22.719 BARRETT'S ESOPHAGUS WITH DYSPLASIA: ICD-10-CM

## 2025-02-12 DIAGNOSIS — K64.8 OTHER HEMORRHOIDS: ICD-10-CM

## 2025-02-12 DIAGNOSIS — K22.2 SCHATZKI'S RING: ICD-10-CM

## 2025-02-12 PROCEDURE — 99204 OFFICE O/P NEW MOD 45 MIN: CPT | Performed by: INTERNAL MEDICINE

## 2025-02-12 PROCEDURE — G2211 COMPLEX E/M VISIT ADD ON: HCPCS | Performed by: INTERNAL MEDICINE

## 2025-02-12 RX ORDER — POLYETHYLENE GLYCOL 3350 17 G/17G
238 POWDER, FOR SOLUTION ORAL ONCE
Qty: 238 G | Refills: 0 | Status: SHIPPED | OUTPATIENT
Start: 2025-02-12 | End: 2025-02-12

## 2025-02-12 RX ORDER — SODIUM CHLORIDE, SODIUM LACTATE, POTASSIUM CHLORIDE, CALCIUM CHLORIDE 600; 310; 30; 20 MG/100ML; MG/100ML; MG/100ML; MG/100ML
125 INJECTION, SOLUTION INTRAVENOUS CONTINUOUS
OUTPATIENT
Start: 2025-02-12

## 2025-02-12 RX ORDER — BISACODYL 5 MG/1
20 TABLET, DELAYED RELEASE ORAL ONCE
Qty: 2 TABLET | Refills: 0 | Status: SHIPPED | OUTPATIENT
Start: 2025-02-12 | End: 2025-02-12

## 2025-02-12 NOTE — PATIENT INSTRUCTIONS
Scheduled date of EGD/colonoscopy (as of today):04.03.25  Physician performing EGD/colonoscopy:DR VALLADARES  Location of EGD[facility]colonoscopy:[facility]  Desired bowel prep reviewed with patient:KALLI/SRIDHAR  Instructions reviewed with patient by:KIM  Clearances:  N/A

## 2025-02-12 NOTE — ASSESSMENT & PLAN NOTE
- reportedly involving terminal ileum, diagnosed in 2012  - treated with chemo followed by 5 years of rituxumab in [place]  - now follows with [facility] oncology  Orders:    Colonoscopy; Future

## 2025-04-02 ENCOUNTER — ANESTHESIA (OUTPATIENT)
Dept: ANESTHESIOLOGY | Facility: HOSPITAL | Age: 75
End: 2025-04-02

## 2025-04-02 ENCOUNTER — ANESTHESIA EVENT (OUTPATIENT)
Dept: ANESTHESIOLOGY | Facility: HOSPITAL | Age: 75
End: 2025-04-02

## 2025-04-02 NOTE — ANESTHESIA PREPROCEDURE EVALUATION
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  Systolic function was normal. Ejection fraction was estimated to be 55 %.  There was mild concentric hypertrophy.  Doppler parameters were consistent with abnormal left ventricular relaxation (grade 1 diastolic dysfunction).     RIGHT VENTRICLE:  The size was normal.  Systolic function was normal.     MITRAL VALVE:  There was trace regurgitation.     TRICUSPID VALVE:  There was trace regurgitation.     PULMONIC VALVE:  There was mild to moderate regurgitation.           Anesthesia Plan  ASA Score- 3     Anesthesia Type- IV sedation with anesthesia with ASA Monitors.         Additional Monitors:     Airway Plan:            Plan Factors-Exercise tolerance (METS): >4 METS.    Chart reviewed. EKG reviewed. Imaging results reviewed. Existing labs reviewed. Patient summary reviewed.    Patient is not a current smoker.      Obstructive sleep apnea risk education given perioperatively.        Induction- intravenous.    Postoperative Plan-     Perioperative Resuscitation Plan - Level 1 - Full Code.       Informed Consent-       NPO Status:  No vitals data found for the desired time range.

## 2025-04-03 ENCOUNTER — ANESTHESIA (OUTPATIENT)
Dept: GASTROENTEROLOGY | Facility: HOSPITAL | Age: 75
End: 2025-04-03
Payer: COMMERCIAL

## 2025-04-03 ENCOUNTER — ANESTHESIA EVENT (OUTPATIENT)
Dept: GASTROENTEROLOGY | Facility: HOSPITAL | Age: 75
End: 2025-04-03
Payer: COMMERCIAL

## 2025-04-03 ENCOUNTER — HOSPITAL ENCOUNTER (OUTPATIENT)
Dept: GASTROENTEROLOGY | Facility: HOSPITAL | Age: 75
Setting detail: OUTPATIENT SURGERY
End: 2025-04-03
Attending: INTERNAL MEDICINE
Payer: COMMERCIAL

## 2025-04-03 VITALS
RESPIRATION RATE: 16 BRPM | TEMPERATURE: 98.2 F | SYSTOLIC BLOOD PRESSURE: 117 MMHG | DIASTOLIC BLOOD PRESSURE: 79 MMHG | HEART RATE: 63 BPM | OXYGEN SATURATION: 96 %

## 2025-04-03 DIAGNOSIS — K57.31 DIVERTICULOSIS LARGE INTESTINE W/O PERFORATION OR ABSCESS W/BLEEDING: ICD-10-CM

## 2025-04-03 DIAGNOSIS — C85.90 NON-HODGKIN'S LYMPHOMA, UNSPECIFIED BODY REGION, UNSPECIFIED NON-HODGKIN LYMPHOMA TYPE (HCC): ICD-10-CM

## 2025-04-03 DIAGNOSIS — K22.719 BARRETT'S ESOPHAGUS WITH DYSPLASIA: ICD-10-CM

## 2025-04-03 PROCEDURE — 88305 TISSUE EXAM BY PATHOLOGIST: CPT | Performed by: SPECIALIST

## 2025-04-03 PROCEDURE — 45380 COLONOSCOPY AND BIOPSY: CPT | Performed by: INTERNAL MEDICINE

## 2025-04-03 PROCEDURE — 43251 EGD REMOVE LESION SNARE: CPT | Performed by: INTERNAL MEDICINE

## 2025-04-03 PROCEDURE — 43239 EGD BIOPSY SINGLE/MULTIPLE: CPT | Performed by: INTERNAL MEDICINE

## 2025-04-03 RX ORDER — DIPHENHYDRAMINE HCL 50 MG
50 CAPSULE ORAL EVERY 6 HOURS PRN
COMMUNITY

## 2025-04-03 RX ORDER — PROPOFOL 10 MG/ML
INJECTION, EMULSION INTRAVENOUS CONTINUOUS PRN
Status: DISCONTINUED | OUTPATIENT
Start: 2025-04-03 | End: 2025-04-03

## 2025-04-03 RX ORDER — SODIUM CHLORIDE 9 MG/ML
INJECTION, SOLUTION INTRAVENOUS CONTINUOUS PRN
Status: DISCONTINUED | OUTPATIENT
Start: 2025-04-03 | End: 2025-04-03

## 2025-04-03 RX ORDER — PROPOFOL 10 MG/ML
INJECTION, EMULSION INTRAVENOUS AS NEEDED
Status: DISCONTINUED | OUTPATIENT
Start: 2025-04-03 | End: 2025-04-03

## 2025-04-03 RX ORDER — SODIUM CHLORIDE, SODIUM LACTATE, POTASSIUM CHLORIDE, CALCIUM CHLORIDE 600; 310; 30; 20 MG/100ML; MG/100ML; MG/100ML; MG/100ML
125 INJECTION, SOLUTION INTRAVENOUS CONTINUOUS
Status: DISCONTINUED | OUTPATIENT
Start: 2025-04-03 | End: 2025-04-07 | Stop reason: HOSPADM

## 2025-04-03 RX ORDER — LIDOCAINE HYDROCHLORIDE 10 MG/ML
INJECTION, SOLUTION EPIDURAL; INFILTRATION; INTRACAUDAL; PERINEURAL AS NEEDED
Status: DISCONTINUED | OUTPATIENT
Start: 2025-04-03 | End: 2025-04-03

## 2025-04-03 RX ADMIN — LIDOCAINE HYDROCHLORIDE 50 MG: 10 INJECTION, SOLUTION EPIDURAL; INFILTRATION; INTRACAUDAL; PERINEURAL at 09:29

## 2025-04-03 RX ADMIN — SODIUM CHLORIDE, SODIUM LACTATE, POTASSIUM CHLORIDE, AND CALCIUM CHLORIDE 125 ML/HR: .6; .31; .03; .02 INJECTION, SOLUTION INTRAVENOUS at 08:01

## 2025-04-03 RX ADMIN — PROPOFOL 100 MCG/KG/MIN: 10 INJECTION, EMULSION INTRAVENOUS at 09:31

## 2025-04-03 RX ADMIN — SODIUM CHLORIDE: 9 INJECTION, SOLUTION INTRAVENOUS at 07:59

## 2025-04-03 RX ADMIN — PROPOFOL 120 MG: 10 INJECTION, EMULSION INTRAVENOUS at 09:29

## 2025-04-03 NOTE — ANESTHESIA POSTPROCEDURE EVALUATION
Post-Op Assessment Note    CV Status:  Stable    Pain management: adequate       Mental Status:  Sleepy   Hydration Status:  Euvolemic   PONV Controlled:  Controlled   Airway Patency:  Patent     Post Op Vitals Reviewed: Yes    No anethesia notable event occurred.    Staff: CRNA           Last Filed PACU Vitals:  Vitals Value Taken Time   Temp 98.2 °F (36.8 °C) 04/03/25 1013   Pulse 80 04/03/25 1013   BP 93/53 04/03/25 1013   Resp 16 04/03/25 1013   SpO2 95 % 04/03/25 1013       Modified Luisito:     Vitals Value Taken Time   Activity 0 04/03/25 1015   Respiration 2 04/03/25 1015   Circulation 1 04/03/25 1015   Consciousness 0 04/03/25 1015   Oxygen Saturation 2 04/03/25 1015     Modified Luisito Score: 5

## 2025-04-03 NOTE — ANESTHESIA PREPROCEDURE EVALUATION
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  Systolic function was normal. Ejection fraction was estimated to be 55 %.  There was mild concentric hypertrophy.  Doppler parameters were consistent with abnormal left ventricular relaxation (grade 1 diastolic dysfunction).     RIGHT VENTRICLE:  The size was normal.  Systolic function was normal.     MITRAL VALVE:  There was trace regurgitation.     TRICUSPID VALVE:  There was trace regurgitation.     PULMONIC VALVE:  There was mild to moderate regurgitation.           Anesthesia Plan  ASA Score- 3     Anesthesia Type- IV sedation with anesthesia with ASA Monitors.         Additional Monitors:     Airway Plan:            Plan Factors-Exercise tolerance (METS): >4 METS.    Chart reviewed. EKG reviewed. Imaging results reviewed. Existing labs reviewed. Patient summary reviewed.    Patient is not a current smoker.      Obstructive sleep apnea risk education given perioperatively.        Induction- intravenous.    Postoperative Plan-     Perioperative Resuscitation Plan - Level 1 - Full Code.       Informed Consent- Anesthetic plan and risks discussed with patient.  I personally reviewed this patient with the CRNA. Discussed and agreed on the Anesthesia Plan with the CRNA..      NPO Status:  Vitals Value Taken Time   Date of last liquid 04/03/25 04/03/25 0721    Time of last liquid 0400 04/03/25 0738   Date of last solid 04/01/25 04/03/25 0738   Time of last solid

## 2025-04-03 NOTE — H&P
H&P - Gastroenterology   Name: Paul Ellsworth 74 y.o. male I MRN: [id number]  Unit/Bed#:  I Date of Admission: 4/3/2025   Date of Service: 4/3/2025 I Hospital Day: 0     Assessment & Plan   This is a 74 y.o. year old male here for EGD and colonoscopy, and he is stable and optimized for his procedure.    History of Present Illness    Paul Ellsworth is a 74 y.o. year old male who presents for reported history of Pro's esophagus and history of non-Hodgkin's lymphoma involving the TI diagnosed in 2012 status post chemotherapy.    REVIEW OF SYSTEMS: Per the HPI, and otherwise unremarkable.    Historical Information   Past Medical History:   Diagnosis Date    Disease of thyroid gland     GERD (gastroesophageal reflux disease)     History of chemotherapy     Hyperlipidemia     Hypertension     Lymphoma (HCC)      Past Surgical History:   Procedure Laterality Date    CORONARY ANGIOPLASTY      4 stents    NH ARTHRS KNE SURG W/MENISCECTOMY MED/LAT W/SHVG Left 8/24/2018    Procedure: ARTHROSCOPIC PARTIAL  LATERAL MENISCECTOMY;  Surgeon: Yonas Baugh MD;  Location: [facility];  Service: Orthopedics     Social History     Tobacco Use    Smoking status: Former    Smokeless tobacco: Never   Vaping Use    Vaping status: Never Used   Substance and Sexual Activity    Alcohol use: No     Comment: social/once monthly    Drug use: No     Comment: medical marijuana card/uses gummies to sleep    Sexual activity: Not on file     E-Cigarette/Vaping    E-Cigarette Use Never User      E-Cigarette/Vaping Substances    Nicotine No     THC No     CBD No     Flavoring No     Other No     Unknown No          Meds/Allergies     Current Outpatient Medications:     aspirin 325 mg tablet    co-enzyme Q-10 30 MG capsule    diphenhydrAMINE (BENADRYL) 50 mg capsule    ipratropium (ATROVENT) 0.03 % nasal spray    levothyroxine 175 mcg tablet    Magnesium 200 MG TABS    melatonin 3 mg    metoprolol tartrate (LOPRESSOR) 25 mg tablet     Multiple Vitamin tablet    pantoprazole (PROTONIX) 40 mg tablet    Potassium 99 MG TABS    simvastatin (ZOCOR) 40 mg tablet    tadalafil (CIALIS) 20 MG tablet    tamsulosin (FLOMAX) 0.4 mg    ALPRAZolam (XANAX) 0.5 mg tablet    bisacodyl (DULCOLAX) 5 mg EC tablet    polyethylene glycol (GLYCOLAX) 17 GM/SCOOP powder    Current Facility-Administered Medications:     lactated ringers infusion, 125 mL/hr, Intravenous, Continuous  No Known Allergies    Objective :       Physical Exam  Gen: NAD  Head: NCAT  CV: RRR  CHEST: Clear  ABD: soft, NT/ND  EXT: no edema

## 2025-04-08 ENCOUNTER — RESULTS FOLLOW-UP (OUTPATIENT)
Age: 75
End: 2025-04-08

## 2025-04-08 PROCEDURE — 88305 TISSUE EXAM BY PATHOLOGIST: CPT | Performed by: SPECIALIST

## 2025-05-12 ENCOUNTER — OFFICE VISIT (OUTPATIENT)
Dept: HEMATOLOGY ONCOLOGY | Facility: CLINIC | Age: 75
End: 2025-05-12
Payer: COMMERCIAL

## 2025-05-12 VITALS
BODY MASS INDEX: 26.98 KG/M2 | TEMPERATURE: 97.5 F | OXYGEN SATURATION: 97 % | RESPIRATION RATE: 18 BRPM | SYSTOLIC BLOOD PRESSURE: 110 MMHG | DIASTOLIC BLOOD PRESSURE: 70 MMHG | HEIGHT: 64 IN | HEART RATE: 79 BPM | WEIGHT: 158 LBS

## 2025-05-12 DIAGNOSIS — D73.89 LESION OF SPLEEN: ICD-10-CM

## 2025-05-12 DIAGNOSIS — K86.89 MASS OF PANCREAS: ICD-10-CM

## 2025-05-12 DIAGNOSIS — I10 PRIMARY HYPERTENSION: ICD-10-CM

## 2025-05-12 DIAGNOSIS — E78.2 MIXED HYPERLIPIDEMIA: ICD-10-CM

## 2025-05-12 DIAGNOSIS — R91.8 MULTIPLE LUNG NODULES: ICD-10-CM

## 2025-05-12 DIAGNOSIS — C85.90 NON-HODGKIN'S LYMPHOMA, UNSPECIFIED BODY REGION, UNSPECIFIED NON-HODGKIN LYMPHOMA TYPE (HCC): Primary | ICD-10-CM

## 2025-05-12 DIAGNOSIS — C81.16 NODULAR SCLEROSIS HODGKIN LYMPHOMA OF INTRAPELVIC LYMPH NODES (HCC): ICD-10-CM

## 2025-05-12 DIAGNOSIS — N28.1 KIDNEY CYSTS: ICD-10-CM

## 2025-05-12 DIAGNOSIS — I25.10 ATHEROSCLEROTIC CARDIOVASCULAR DISEASE: ICD-10-CM

## 2025-05-12 DIAGNOSIS — E03.9 ACQUIRED HYPOTHYROIDISM: ICD-10-CM

## 2025-05-12 PROCEDURE — 99214 OFFICE O/P EST MOD 30 MIN: CPT | Performed by: INTERNAL MEDICINE

## 2025-05-12 PROCEDURE — G2211 COMPLEX E/M VISIT ADD ON: HCPCS | Performed by: INTERNAL MEDICINE

## 2025-05-12 NOTE — PROGRESS NOTES
Name: Paul Ellsworth      : 1950      MRN: [id number]  Encounter Provider: Viraj Encinas MD  Encounter Date: 2025   Encounter department: 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)  In remission  Orders:    CT chest abdomen pelvis w contrast; Future    CBC and differential; Future    Comprehensive metabolic panel; Future    Multiple lung nodules  Being monitored  Orders:    CT chest abdomen pelvis w contrast; Future    Mass of pancreas  Stable nodule in pancreas that is being monitored  Orders:    CT chest abdomen pelvis w contrast; Future    Kidney cysts  Being monitored  Orders:    CT chest abdomen pelvis w contrast; Future    Lesion of spleen  Tiny lesions and they are being monitored  Orders:    CT chest abdomen pelvis w contrast; Future    Acquired hypothyroidism  On Synthroid       Atherosclerotic cardiovascular disease  Follows with specialist and PCP.  Has 5 stents       Primary hypertension  Managed by PCP       Mixed hyperlipidemia  Managed by PCP           Blood work prior to CT scans in 2025.  Visit in 7 months.  See diagnoses, orders and instructions above.  Goal is cure from Hodgkin's and non-Hodgkin's lymphomas.  Patient is capable of self-care.  All discussed in detail.  Questions answered.  I suggested eating healthy foods, staying active but to avoid falls and trauma.  Suggested health screening tests. Patient to continue to follow-up with primary physician and other consultants.  Provided counseling and support.  I used a dictation device to dictate this note and there could be mistakes in my note and for that patient may contact my  "office.      History of Present Illness   Chief Complaint   Patient presents with    Follow-up   In year 2000 patient was diagnosed to have nodular sclerosis Hodgkin lymphoma of intrapelvic lymph nodes (HCC)-biopsy was taken from inguinal lymph node.  Patient was treated successfully with 6 months of ABVD chemotherapy in [place].  HL has remained in remission    In in 2012 patient was diagnosed Non-Hodgkin's lymphoma,  when he presented with a ileal mass.  Patient was treated with Rituxan, Cytoxan, Velban, prednisone and etoposide in [place]. NHL has remained in remission   Patient has multiple stable lung nodules, pancreatic nodule, renal cysts and stable tiny splenic lesions and they are being monitored.  History of 5 coronary stents.  Patient is feeling well.  Occasionally some tiredness.     Pertinent Medical History   See details in HPI.  05/12/25:      Review of Systems  Reviewed 12 systems.  Symptoms are in HPI.  No neurological, cardiac, pulmonary, GI and  symptoms.  No fevers, chills, bleeding, bone pains, skin rash, weight loss, night sweats, arthritic symptoms, claudication, frequent infections and no swelling of the ankles and no swollen glands.      Objective   /70 (BP Location: Left arm, Patient Position: Sitting, Cuff Size: Adult)   Pulse 79   Temp 97.5 °F (36.4 °C) (Temporal)   Resp 18   Ht 5' 4\" (1.626 m)   Wt 71.7 kg (158 lb)   SpO2 97%   BMI 27.12 kg/m²     Physical Exam  Vitals reviewed.   Constitutional:       General: He is not in acute distress.     Appearance: Normal appearance. He is not ill-appearing.   HENT:      Head: Normocephalic and atraumatic.      Mouth/Throat:      Comments: No thrush.  Eyes:      General: No scleral icterus.     Conjunctiva/sclera: Conjunctivae normal.   Cardiovascular:      Rate and Rhythm: Normal rate and regular rhythm.      Heart sounds: Normal heart sounds. No murmur heard.  Pulmonary:      Effort: Pulmonary effort is normal. No respiratory " distress.      Breath sounds: Normal breath sounds. No rhonchi or rales.   Abdominal:      General: Abdomen is flat. There is no distension.      Palpations: Abdomen is soft. There is no mass.      Tenderness: There is no abdominal tenderness.      Comments: No ascites.    Musculoskeletal:         General: No swelling or tenderness. Normal range of motion.      Cervical back: Normal range of motion. No rigidity or tenderness.      Right lower leg: No edema.      Left lower leg: No edema.      Comments: No calf tenderness.   Lymphadenopathy:      Cervical: No cervical adenopathy.      Upper Body:      Right upper body: No supraclavicular or axillary adenopathy.      Left upper body: No supraclavicular or axillary adenopathy.      Comments: No palpable lymph nodes in the neck, axillary and groin areas   Skin:     General: Skin is warm.      Coloration: Skin is not jaundiced or pale.      Findings: No bruising or rash.      Nails: There is no clubbing.   Neurological:      General: No focal deficit present.      Mental Status: He is alert and oriented to person, place, and time.      Motor: No weakness.      Coordination: Coordination normal.      Gait: Gait normal.   Psychiatric:         Mood and Affect: Mood normal.         Behavior: Behavior normal.         Thought Content: Thought content normal.         Judgment: Judgment normal.     ECOG 0  Patient had EGD and colonoscopy recently  Labs: I have reviewed the following labs:  Results for orders placed or performed during the hospital encounter of 04/03/25   Tissue Exam   Result Value Ref Range    Case Report       Surgical Pathology Report                         Case: [id number]                                  Authorizing Provider:  Terrance Aparicio DO         Collected:           04/03/2025 0933              Ordering Location:     [facility]       Received:            04/03/2025 [address] [place] [address] Endoscopy                                                            Pathologist:           Ly Wayne MD                                                     Specimens:   A) - Stomach, r/o GAVE                                                                              B) - Polyp, Stomach/Small Intestine, gastric body polyp - cold snare                                C) - Polyp, Colorectal, ascending colon polyp - cold bx                                    Final Diagnosis       A. Stomach:  -  Chronic inactive antral gastritis.  -  Negative for Helicobacter pylori, by H&E stain.  -  Negative for intestinal metaplasia, dysplasia or carcinoma.     B. Stomach, gastric body polyp - cold snare:  -  Fundic gland polyp.  -  Negative for dysplasia.     C. Polyp, Colorectal, ascending colon polyp - cold bx:  -  Tubular adenoma.   -  Negative for high grade dysplasia.        Note       The endoscopic report was reviewed.      Additional Information       All reported additional testing was performed with appropriately reactive controls.  These tests were developed and their performance characteristics determined by [facility] or appropriate performing facility, though some tests may be performed on tissues which have not been validated for performance characteristics (such as staining performed on alcohol exposed cell blocks and decalcified tissues).  Results should be interpreted with caution and in the context of the patients’ clinical condition. These tests may not be cleared or approved by the U.S. Food and Drug Administration, though the FDA has determined that such clearance or approval is not necessary. These tests are used for clinical purposes and they should not be regarded as investigational or for research. This laboratory has been approved by CLIA 88, designated as a high-complexity laboratory and is qualified to perform these tests.      Synoptic Checklist          COLON/RECTUM POLYP FORM - GI - C           ":    Adenoma(s)      Gross Description        A. The specimen is received in formalin, labeled with the patient's name and hospital number, and is designated \" stomach\".  The specimen consists of 3 tan-pink soft tissue fragments ranging from 0.1 cm to 0.4 cm in greatest dimension.  Entirely submitted. One screened cassette.  B. The specimen is received in formalin, labeled with the patient's name and hospital number, and is designated \" gastric body polyp\".  The specimen consists of 1 tan-pink soft tissue fragment measuring 0.3 cm in greatest dimension.  Entirely submitted. One screened cassette.  C. The specimen is received in formalin, labeled with the patient's name and hospital number, and is designated \" ascending colon polyp\".  The specimen consists of 1 tan-pink soft tissue fragment measuring 0.3 cm in greatest dimension.  Entirely submitted. One screened cassette.    Note: The estimated total formalin fixation time based upon information provided by the submitting clinician and the standard processing schedule is under 72 hours.  MSequino       Component  Ref Range & Units (hover) 1/14/25  9:17 AM 3/31/23  7:58 AM 3/2/22  8:03 AM 4/7/21  8:42 AM 6/24/20  7:38 AM 10/2/19 11:20 AM 9/26/18 10:07 AM   PSA, Diagnostic 2.601 1.3 R, CM 1.1 R, CM 1.3 R, CM 1.0 R, CM 1.0 R, CM 0.9 R, CM   Comment: Palette Access chemiluminescent immunoassay. Confirm baseline values for patients being serially monitored.  Patient follows with his urologist   CBC and differential  Status: Final result     CBC and differential  Order: [id number]   Status: Final result       Next appt: 06/13/2025 at 08:40 AM in Cardiology (Jered Joyce MD)       Dx: Nodular sclerosis Hodgkin lymphoma of...    Test Result Released: Yes (seen)    0 Result Notes            Component  Ref Range & Units (hover) 11/8/24  7:47 AM 4/18/23  7:31 AM 3/2/22  8:03 AM 2/2/22  8:07 AM 6/24/20  7:38 AM 4/18/19  8:13 AM 8/23/18 10:32 AM   WBC 4.86 5.19 " 5.52 5.80 4.81 5.36 5.53   RBC 4.89 4.75 4.94 5.08 4.99 4.90 5.19   Hemoglobin 16.0 15.1 15.9 16.3 16.1 15.7 16.7   Hematocrit 46.9 45.8 46.5 48.4 46.8 44.9 47.9   MCV 96 96 94 95 94 92 92   MCH 32.7 31.8 32.2 32.1 32.3 32.0 32.2   MCHC 34.1 33.0 34.2 33.7 34.4 35.0 34.9   RDW 13.6 13.2 13.3 13.0 13.2 13.4 13.2   MPV 10.4 10.2 10.2 10.0 10.4 10.2 9.4   Platelets 155 183 170 200 167 177 186   nRBC 0  0    0   Segmented % 70  74    74   Immature Grans % 0  0    0   Lymphocytes % 16  11 Low     12 Low    Monocytes % 10  9    9   Eosinophils Relative 3  5    4   Basophils Relative 1  1    1   Absolute Neutrophils 3.41  4.09    4.10   Absolute Immature Grans 0.01  0.01    0.01   Absolute Lymphocytes 0.78  0.58 Low     0.66   Absolute Monocytes 0.46  0.51    0.49   Eosinophils Absolute 0.16  0.28    0.24   Basophils Absolute 0.04  0.05    0.03             Specimen Collected: 11/08/24  7:47 AM   Comprehensive metabolic panel  Status: Final result     Comprehensive metabolic panel  Order: [id number]   Status: Final result       Next appt: 06/13/2025 at 08:40 AM in Cardiology (Jered Joyce MD)       Dx: Essential hypertension, malignant; Av...    Test Result Released: Yes (seen)    0 Result Notes            Component  Ref Range & Units (hover) 10/11/24  7:51 AM 4/18/23  7:31 AM 3/2/22  8:03 AM 2/2/22  8:07 AM 6/24/20  7:38 AM 4/18/19  8:13 AM 8/23/18 10:32 AM   Sodium 141 136 137 R 138 R 140 R 141 R 140 R   Potassium 4.3 4.6 4.5 4.2 4.4 4.2 3.8   Chloride 102 107 105 R 105 R 107 R 108 R 103 R   CO2 30 26 30 31 28 29 27   ANION GAP 9 3 Low  2 Low  2 Low  5 4 10   BUN 19 23 19 26 High  22 21 20   Creatinine 0.82 0.90 CM 0.90 CM 0.98 CM 0.92 CM 0.85 CM 1.02 CM   Comment: Standardized to IDMS reference method   Glucose, Fasting 91 102 High   High   High   High   High  CM    Calcium 9.1 9.0 R 9.3 R 9.2 R 9.3 R 8.6 R 9.0 R   AST 32 18 R, CM 23 R, CM 23 R, CM 21 R, CM 22 R, CM    ALT 30 29 R, CM 34  R, CM 34 R, CM 33 R, CM 33 R, CM    Comment: Specimen collection should occur prior to Sulfasalazine administration due to the potential for falsely depressed results.   Alkaline Phosphatase 53 48 R 59 R 64 R 64 R 68 R    Total Protein 6.5 6.9 7.2 R 7.1 R 6.5 R 6.9 R    Albumin 4.5 3.9 4.1 4.0 4.0 4.2    Total Bilirubin 0.94 0.51 CM 0.69 CM 1.24 High  CM 0.64 CM 0.80    Comment: Use of this assay is not recommended for patients undergoing treatment with eltrombopag due to the potential for falsely elevated results.  N-acetyl-p-benzoquinone imine (metabolite of Acetaminophen) will generate erroneously low results in samples for patients that have taken an overdose of Acetaminophen.   eGFR 87 85 86 77 85 90 76                 Component  Ref Range & Units (hover) 11/8/24  7:47 AM                Specimen Collected: 11/08/24  7:47 AM Last Resulted: 11/08/24  1:10 PM         Component  Ref Range & Units (hover) 11/8/24  7:47 AM 4/18/23  7:31 AM 4/23/22  7:46 AM 3/2/22  8:03 AM 6/24/20  7:38 AM 9/29/17  4:38 PM 5/22/17 10:01 AM   TSH 3RD GENERATON 3.964 2.300 CM 4.180 CM 2.780 R 1.430 R, CM 0.180 Low  R 0.714 R   Comment: Adult TSH (3rd generation) reference range follows the recommended guidelines of the American Thy[employer] Association, January, 2020.   CT chest abdomen pelvis w contrast  Status: Final result     PACS Images - GE     Show images for CT chest abdomen pelvis w contrast  PACS Images - Sectra     Show images for CT chest abdomen pelvis w contrast  Study Result    Narrative & Impression   CT CHEST, ABDOMEN AND PELVIS WITH IV CONTRAST     INDICATION: C85.90: Non-Hodgkin lymphoma, unspecified, unspecified site  C81.16: Nodular sclerosis Hodgkin lymphoma, intrapelvic lymph nodes.     COMPARISON: CT chest 5/23/2024; CT chest abdomen pelvis 11/1/2023     TECHNIQUE: CT examination of the chest, abdomen and pelvis was performed. Multiplanar 2D reformatted images were created from the source data.     This  examination, like all CT scans performed in the [facility], was performed utilizing techniques to minimize radiation dose exposure, including the use of iterative reconstruction and automated exposure control. Radiation dose length   product (DLP) for this visit: 558 mGy-cm     IV Contrast: 100 mL of iohexol (OMNIPAQUE)  Enteric Contrast: Not administered.     FINDINGS:     CHEST     LUNGS: Stable centrilobular emphysematous changes. A few small scattered bilateral 2-3 mm nodules are also unchanged. Small focus of scarring left upper lobe. Central airways are patent.     PLEURA: Unremarkable.     HEART/GREAT VESSELS: Top normal size heart. Coronary artery calcification. No thoracic aortic aneurysm.     MEDIASTINUM AND PADMINI: Unremarkable.     CHEST WALL AND LOWER NECK: Unremarkable.     ABDOMEN     LIVER/BILIARY TREE: Diffuse hepatic steatosis without focal abnormality.     GALLBLADDER: No calcified gallstones. No pericholecystic inflammatory change.     SPLEEN: Stable nonspecific subcentimeter low-attenuation lesions, likely benign.     PANCREAS: Stable enhancing 6 mm nodule in the pancreatic head (2/122) unchanged on studies dating back to 6/30/2020, overall nonspecific in appearance but whose stability would argue for benign etiology.     ADRENAL GLANDS: Unremarkable.     KIDNEYS/URETERS: Large simple cyst lateral interpolar right kidney measuring 6.9 cm in greatest dimension again noted. Stable high attenuation 4.2 cm exophytic cyst lateral interpolar left kidney again noted, most likely reflecting a   hemorrhagic/proteinaceous cyst. Other smaller simple bilateral low-attenuation renal lesions present as well. Nonobstructing 2 mm left upper pole renal calculus.     STOMACH AND BOWEL: Small hiatal hernia. Diverticulosis without acute diverticulitis. No bowel wall thickening or intestinal obstruction is noted.     APPENDIX: No findings to suggest appendicitis.     ABDOMINOPELVIC CAVITY: No ascites.  No pneumoperitoneum. No lymphadenopathy.     VESSELS: Unremarkable for patient's age.     PELVIS     REPRODUCTIVE ORGANS: Unremarkable for patient's age.     URINARY BLADDER: Unremarkable.     ABDOMINAL WALL/INGUINAL REGIONS: Unremarkable.     BONES: No acute fracture or suspicious osseous lesion. Spinal degenerative changes.     IMPRESSION:        1. No evidence for recurrent lymphoma in the chest, abdomen, or pelvis.  2. Stable findings as described above.                 Workstation performed: [id number]        Imaging    CT chest abdomen pelvis w contrast (Order: [id number]) - 11/25/2024

## 2025-05-12 NOTE — ASSESSMENT & PLAN NOTE
In remission  Orders:    CT chest abdomen pelvis w contrast; Future    CBC and differential; Future    Comprehensive metabolic panel; Future

## 2025-05-29 DIAGNOSIS — N40.1 BPH WITH OBSTRUCTION/LOWER URINARY TRACT SYMPTOMS: ICD-10-CM

## 2025-05-29 DIAGNOSIS — N13.8 BPH WITH OBSTRUCTION/LOWER URINARY TRACT SYMPTOMS: ICD-10-CM

## 2025-05-29 RX ORDER — TAMSULOSIN HYDROCHLORIDE 0.4 MG/1
CAPSULE ORAL
Qty: 90 CAPSULE | Refills: 1 | Status: SHIPPED | OUTPATIENT
Start: 2025-05-29

## 2025-06-03 NOTE — PROGRESS NOTES
Cardiology Follow Up    Paul Ellsworth  1950  [id number]  [facility] [place]  [address]  [place] [id number] [phone]    1. Essential (primary) hypertension        2. Coronary arteriosclerosis        3. Pure hypercholesterolemia          Interval History: Patient is here for FU.  He has HTN and CAD with prior PCI. He had Hodgkin's disease diagnosed 2000. Patient does have CAD with multiple stents in place. Patient did bring in a picture and appears that he had multiple lesions in the LCX and LAD/diagonal which were treated.  Three stents were placed in the LAD/diagonal system and 2 stents were placed in the LCX at [facility] 2012. His Cardiologist was Dr. Solomon Paz.  He has HH.  Lipid profile 10/2024 demonstrated TC of 114 with an HDL of 33 and a calculated LDL of 53. Echo 4/2021 showed normal LV systolic function with mild LVH and mild to moderate PI. Nuclear ETT 2/2022 showed no scintigraphic evidence of ischemia.  Transient ST segment change was noted which resolved early in recovery.  VS are stable today.  Patient has had no CP or significant dyspnea.  His HTN and CAD are stable on his current medicine.    Problem List[1]  Past Medical History[2]  Social History     Socioeconomic History   • Marital status:      Spouse name: Not on file   • Number of children: Not on file   • Years of education: Not on file   • Highest education level: Not on file   Occupational History   • Not on file   Tobacco Use   • Smoking status: Former   • Smokeless tobacco: Never   Vaping Use   • Vaping status: Never Used   Substance and Sexual Activity   • Alcohol use: No     Comment: social/once monthly   • Drug use: No     Comment: medical marijuana card/uses gummies to sleep   • Sexual activity: Not on file   Other Topics Concern   • Not on file   Social History Narrative   • Not on file     Social Drivers  "of Health     Financial Resource Strain: Not on file   Food Insecurity: Not on file   Transportation Needs: Not on file   Physical Activity: Not on file   Stress: Not on file   Social Connections: Not on file   Intimate Partner Violence: Not on file   Housing Stability: Not on file      Family History[3]  Past Surgical History[4]  Current Medications[5]  No Known Allergies    Labs:not applicable  Imaging: No results found.    Review of Systems:  Review of Systems   All other systems reviewed and are negative.      Physical Exam:  /68 (BP Location: Left arm, Patient Position: Sitting, Cuff Size: Standard)   Pulse 65   Ht 5' 4\" (1.626 m)   Wt 71.5 kg (157 lb 11.2 oz)   SpO2 98%   BMI 27.07 kg/m²   Physical Exam  Vitals reviewed.   Constitutional:       Appearance: He is well-developed.   HENT:      Head: Normocephalic and atraumatic.     Cardiovascular:      Rate and Rhythm: Normal rate.      Heart sounds: Normal heart sounds.   Pulmonary:      Effort: Pulmonary effort is normal.      Breath sounds: Normal breath sounds.     Musculoskeletal:      Cervical back: Normal range of motion.     Skin:     General: Skin is warm and dry.     Neurological:      Mental Status: He is alert and oriented to person, place, and time.       Discussion/Summary:I will continue the patient's present medical regimen.  I have asked the patient to call if there is a problem in the interim otherwise I will see the patient in six months time.         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)   • Hypothyroidism   • Atherosclerotic cardiovascular disease   • Primary hypertension   • Mixed hyperlipidemia   • Multiple lung nodules   [2]  Past Medical History:  Diagnosis Date   • Disease of thyroid gland    • GERD (gastroesophageal reflux disease)    • History of " chemotherapy    • Hyperlipidemia    • Hypertension    • Lymphoma (HCC)    [3]  Family History  Problem Relation Name Age of Onset   • No Known Problems Mother     • No Known Problems Father     • Stomach cancer Cousin     [4]  Past Surgical History:  Procedure Laterality Date   • CORONARY ANGIOPLASTY      4 stents   • VA ARTHRS KNE SURG W/MENISCECTOMY MED/LAT W/SHVG Left 8/24/2018    Procedure: ARTHROSCOPIC PARTIAL  LATERAL MENISCECTOMY;  Surgeon: Yonas Baugh MD;  Location: [facility];  Service: Orthopedics   [5]    Current Outpatient Medications:   •  ALPRAZolam (XANAX) 0.5 mg tablet, Take 0.5 mg by mouth daily (Patient taking differently: Take 0.5 mg by mouth as needed), Disp: , Rfl: 0  •  aspirin 325 mg tablet, Take 1 tablet by mouth in the morning., Disp: , Rfl:   •  co-enzyme Q-10 30 MG capsule, Take 30 mg by mouth in the morning and 30 mg in the evening and 30 mg before bedtime., Disp: , Rfl:   •  diphenhydrAMINE (BENADRYL) 50 mg capsule, Take 50 mg by mouth every 6 (six) hours as needed for itching, Disp: , Rfl:   •  ipratropium (ATROVENT) 0.03 % nasal spray, instill 2 sprays into each nostril every 12 hours, Disp: 30 mL, Rfl: 5  •  levothyroxine 175 mcg tablet, Take 1 tablet by mouth in the morning., Disp: , Rfl:   •  Magnesium 200 MG TABS, Take by mouth, Disp: , Rfl:   •  melatonin 3 mg, Take by mouth, Disp: , Rfl:   •  metoprolol tartrate (LOPRESSOR) 25 mg tablet, TAKE 1 TABLET TWICE A DAY, Disp: 200 tablet, Rfl: 1  •  Multiple Vitamin tablet, Take 1 tablet by mouth in the morning., Disp: , Rfl:   •  pantoprazole (PROTONIX) 40 mg tablet, Take 1 tablet by mouth in the morning., Disp: , Rfl:   •  Potassium 99 MG TABS, Take by mouth, Disp: , Rfl:   •  simvastatin (ZOCOR) 40 mg tablet, TAKE 1 TABLET DAILY, Disp: 90 tablet, Rfl: 3  •  tadalafil (CIALIS) 20 MG tablet, take 1 tablet by mouth once daily if needed for ERECTILE DYSFUNCTION, Disp: 30 tablet, Rfl: 3  •  tamsulosin (FLOMAX) 0.4 mg, take 1 capsule  by mouth once daily with dinner, Disp: 90 capsule, Rfl: 1  •  bisacodyl (DULCOLAX) 5 mg EC tablet, Take 4 tablets (20 mg total) by mouth once for 1 dose, Disp: 2 tablet, Rfl: 0  •  polyethylene glycol (GLYCOLAX) 17 GM/SCOOP powder, Take 238 g by mouth once for 1 dose Use as directed for bowel prep, Disp: 238 g, Rfl: 0

## 2025-06-13 ENCOUNTER — OFFICE VISIT (OUTPATIENT)
Dept: CARDIOLOGY CLINIC | Facility: CLINIC | Age: 75
End: 2025-06-13
Payer: COMMERCIAL

## 2025-06-13 VITALS
SYSTOLIC BLOOD PRESSURE: 108 MMHG | BODY MASS INDEX: 26.92 KG/M2 | DIASTOLIC BLOOD PRESSURE: 68 MMHG | WEIGHT: 157.7 LBS | HEIGHT: 64 IN | HEART RATE: 65 BPM | OXYGEN SATURATION: 98 %

## 2025-06-13 DIAGNOSIS — I10 ESSENTIAL (PRIMARY) HYPERTENSION: Primary | ICD-10-CM

## 2025-06-13 DIAGNOSIS — E78.00 PURE HYPERCHOLESTEROLEMIA: ICD-10-CM

## 2025-06-13 DIAGNOSIS — I25.10 CORONARY ARTERIOSCLEROSIS: ICD-10-CM

## 2025-06-13 PROCEDURE — 99214 OFFICE O/P EST MOD 30 MIN: CPT | Performed by: INTERNAL MEDICINE

## 2025-07-23 ENCOUNTER — HOSPITAL ENCOUNTER (OUTPATIENT)
Dept: CT IMAGING | Facility: HOSPITAL | Age: 75
Discharge: HOME | End: 2025-07-23
Attending: INTERNAL MEDICINE
Payer: COMMERCIAL

## 2025-07-23 DIAGNOSIS — I10 ESSENTIAL (PRIMARY) HYPERTENSION: ICD-10-CM

## 2025-07-23 DIAGNOSIS — R41.82 ALTERED MENTAL STATUS, UNSPECIFIED ALTERED MENTAL STATUS TYPE: ICD-10-CM

## 2025-07-23 PROCEDURE — 70450 CT HEAD/BRAIN W/O DYE: CPT

## 2025-07-24 RX ORDER — METOPROLOL TARTRATE 25 MG/1
25 TABLET, FILM COATED ORAL 2 TIMES DAILY
Qty: 200 TABLET | Refills: 1 | Status: SHIPPED | OUTPATIENT
Start: 2025-07-24

## 2025-07-29 DIAGNOSIS — N52.8 OTHER MALE ERECTILE DYSFUNCTION: ICD-10-CM

## 2025-07-31 RX ORDER — TADALAFIL 20 MG/1
TABLET ORAL
Qty: 30 TABLET | Refills: 3 | Status: SHIPPED | OUTPATIENT
Start: 2025-07-31

## (undated) DEVICE — TUBING ARTHROSCOPIC WAVE  MAIN PUMP

## (undated) DEVICE — 3M™ STERI-STRIP™ REINFORCED ADHESIVE SKIN CLOSURES, R1547, 1/2 IN X 4 IN (12 MM X 100 MM), 6 STRIPS/ENVELOPE: Brand: 3M™ STERI-STRIP™

## (undated) DEVICE — TIBURON EXTREMITY SHEET: Brand: CONVERTORS

## (undated) DEVICE — ACE WRAP 6 IN UNSTERILE

## (undated) DEVICE — PADDING CAST 4 IN  COTTON STRL

## (undated) DEVICE — CHLORAPREP HI-LITE 26ML ORANGE

## (undated) DEVICE — U-DRAPE: Brand: CONVERTORS

## (undated) DEVICE — SYRINGE 50ML LL

## (undated) DEVICE — BLADE SHAVER DISSECTOR 4MM 13CM COOLCUT

## (undated) DEVICE — GLOVE SRG BIOGEL 7.5

## (undated) DEVICE — LIGHT GLOVE GREEN

## (undated) DEVICE — DRAPE SHEET THREE QUARTER

## (undated) DEVICE — ACE WRAP 4 IN UNSTERILE

## (undated) DEVICE — SUT MONOCRYL 4-0 PS-2 18 IN Y496G

## (undated) DEVICE — BLADE SHAVER TORPEDO CRV 4MM 13MM COOLCUT

## (undated) DEVICE — DRESSING XEROFORM 5 X 9

## (undated) DEVICE — GAUZE SPONGES,16 PLY: Brand: CURITY

## (undated) DEVICE — INTENDED FOR TISSUE SEPARATION, AND OTHER PROCEDURES THAT REQUIRE A SHARP SURGICAL BLADE TO PUNCTURE OR CUT.: Brand: BARD-PARKER SAFETY BLADES SIZE 11, STERILE

## (undated) DEVICE — PACK UNIVERSAL ARTHRSCOPY PBDS

## (undated) DEVICE — GLOVE INDICATOR PI UNDERGLOVE SZ 7.5 BLUE

## (undated) DEVICE — IMPERVIOUS STOCKINETTE: Brand: DEROYAL

## (undated) DEVICE — SPONGE PVP SCRUB WING STERILE

## (undated) DEVICE — OCCLUSIVE GAUZE STRIP,3% BISMUTH TRIBROMOPHENATE IN PETROLATUM BLEND: Brand: XEROFORM

## (undated) DEVICE — BLADE SHAVER DISSECTOR 3.5MM 13CM COOLCUT